# Patient Record
Sex: FEMALE | Race: WHITE | NOT HISPANIC OR LATINO | Employment: OTHER | URBAN - METROPOLITAN AREA
[De-identification: names, ages, dates, MRNs, and addresses within clinical notes are randomized per-mention and may not be internally consistent; named-entity substitution may affect disease eponyms.]

---

## 2017-01-24 DIAGNOSIS — E03.9 ACQUIRED HYPOTHYROIDISM: Primary | ICD-10-CM

## 2017-01-25 RX ORDER — LEVOTHYROXINE SODIUM 175 UG/1
TABLET ORAL
Qty: 90 TABLET | Refills: 1 | Status: SHIPPED | OUTPATIENT
Start: 2017-01-25 | End: 2017-08-01

## 2017-05-26 ENCOUNTER — OFFICE VISIT (OUTPATIENT)
Dept: FAMILY MEDICINE | Facility: OTHER | Age: 42
End: 2017-05-26
Attending: NURSE PRACTITIONER
Payer: COMMERCIAL

## 2017-05-26 VITALS
BODY MASS INDEX: 29.08 KG/M2 | HEIGHT: 62 IN | TEMPERATURE: 98.7 F | DIASTOLIC BLOOD PRESSURE: 60 MMHG | WEIGHT: 158 LBS | SYSTOLIC BLOOD PRESSURE: 110 MMHG | HEART RATE: 87 BPM | OXYGEN SATURATION: 99 %

## 2017-05-26 DIAGNOSIS — E03.9 ACQUIRED HYPOTHYROIDISM: ICD-10-CM

## 2017-05-26 DIAGNOSIS — F40.10 SOCIAL ANXIETY DISORDER: Primary | ICD-10-CM

## 2017-05-26 DIAGNOSIS — N92.1 MENORRHAGIA WITH IRREGULAR CYCLE: ICD-10-CM

## 2017-05-26 LAB
CHOLEST SERPL-MCNC: 171 MG/DL
ERYTHROCYTE [DISTWIDTH] IN BLOOD BY AUTOMATED COUNT: 13.2 % (ref 10–15)
HCT VFR BLD AUTO: 39.3 % (ref 35–47)
HDLC SERPL-MCNC: 46 MG/DL
HGB BLD-MCNC: 13.5 G/DL (ref 11.7–15.7)
LDLC SERPL CALC-MCNC: 94 MG/DL
MCH RBC QN AUTO: 28.1 PG (ref 26.5–33)
MCHC RBC AUTO-ENTMCNC: 34.4 G/DL (ref 31.5–36.5)
MCV RBC AUTO: 82 FL (ref 78–100)
NONHDLC SERPL-MCNC: 125 MG/DL
PLATELET # BLD AUTO: 163 10E9/L (ref 150–450)
RBC # BLD AUTO: 4.81 10E12/L (ref 3.8–5.2)
T4 FREE SERPL-MCNC: 1.43 NG/DL (ref 0.76–1.46)
TRIGL SERPL-MCNC: 157 MG/DL
TSH SERPL DL<=0.005 MIU/L-ACNC: 0.02 MU/L (ref 0.4–4)
WBC # BLD AUTO: 6.3 10E9/L (ref 4–11)

## 2017-05-26 PROCEDURE — 84443 ASSAY THYROID STIM HORMONE: CPT | Mod: ZL | Performed by: NURSE PRACTITIONER

## 2017-05-26 PROCEDURE — 84439 ASSAY OF FREE THYROXINE: CPT | Mod: ZL | Performed by: NURSE PRACTITIONER

## 2017-05-26 PROCEDURE — 85027 COMPLETE CBC AUTOMATED: CPT | Mod: ZL | Performed by: NURSE PRACTITIONER

## 2017-05-26 PROCEDURE — 99212 OFFICE O/P EST SF 10 MIN: CPT

## 2017-05-26 PROCEDURE — 36415 COLL VENOUS BLD VENIPUNCTURE: CPT | Mod: ZL | Performed by: NURSE PRACTITIONER

## 2017-05-26 PROCEDURE — 99213 OFFICE O/P EST LOW 20 MIN: CPT | Performed by: NURSE PRACTITIONER

## 2017-05-26 PROCEDURE — 80061 LIPID PANEL: CPT | Mod: ZL | Performed by: NURSE PRACTITIONER

## 2017-05-26 RX ORDER — CITALOPRAM HYDROBROMIDE 20 MG/1
20 TABLET ORAL DAILY
Qty: 30 TABLET | Refills: 1 | Status: SHIPPED | OUTPATIENT
Start: 2017-05-26 | End: 2017-06-09

## 2017-05-26 RX ORDER — SUMATRIPTAN 25 MG/1
25 TABLET, FILM COATED ORAL
COMMUNITY
Start: 2016-01-19 | End: 2018-06-19

## 2017-05-26 ASSESSMENT — ANXIETY QUESTIONNAIRES
6. BECOMING EASILY ANNOYED OR IRRITABLE: NOT AT ALL
1. FEELING NERVOUS, ANXIOUS, OR ON EDGE: SEVERAL DAYS
3. WORRYING TOO MUCH ABOUT DIFFERENT THINGS: SEVERAL DAYS
2. NOT BEING ABLE TO STOP OR CONTROL WORRYING: SEVERAL DAYS
GAD7 TOTAL SCORE: 6
5. BEING SO RESTLESS THAT IT IS HARD TO SIT STILL: SEVERAL DAYS
IF YOU CHECKED OFF ANY PROBLEMS ON THIS QUESTIONNAIRE, HOW DIFFICULT HAVE THESE PROBLEMS MADE IT FOR YOU TO DO YOUR WORK, TAKE CARE OF THINGS AT HOME, OR GET ALONG WITH OTHER PEOPLE: NOT DIFFICULT AT ALL
7. FEELING AFRAID AS IF SOMETHING AWFUL MIGHT HAPPEN: SEVERAL DAYS

## 2017-05-26 ASSESSMENT — PAIN SCALES - GENERAL: PAINLEVEL: NO PAIN (0)

## 2017-05-26 ASSESSMENT — PATIENT HEALTH QUESTIONNAIRE - PHQ9: 5. POOR APPETITE OR OVEREATING: SEVERAL DAYS

## 2017-05-26 NOTE — PATIENT INSTRUCTIONS
ASSESSMENT/PLAN:                                                        1. Social anxiety disorder  chronic  - citalopram (CELEXA) 20 MG tablet; Take 1 tablet (20 mg) by mouth daily  Dispense: 30 tablet; Refill: 1    2. Acquired hypothyroidism  chronic  - TSH with free T4 reflex  - Lipid Profile    3. Menorrhagia with irregular cycle  - CBC with platelets  - due for pap, will return    FUTURE APPOINTMENTS:       - Follow-up visit in 2 weeks for comprehensive exam and follow-up of anxiety    Maria Del Rosario Alvarez NP  [facility]

## 2017-05-26 NOTE — PROGRESS NOTES
SUBJECTIVE:                                                    Gita Jones is a 41 year old female who presents to clinic today for the following health issues:  Chief Complaint   Patient presents with     Establish Care     Abnormal Bleeding Problem     Having spotting, 2 periods a month just this month. Denies fever, chills.        New Patient/Transfer of Care - she had been following in [place], but decided to transfer here due to the drive.   Migraine Follow-Up    Headaches symptoms:  Stable     Frequency: twice a year     Duration of headaches: days    Able to do normal daily activities/work with migraines: No     Rescue/Relief medication:sumatriptan (Imitrex)              Effectiveness: moderate relief    Preventative medication: None    Neurologic complications: No new stroke-like symptoms, loss of vision or speech, numbness or weakness    In the past 4 weeks, how often have you gone to Urgent Care or the emergency room because of your headaches?  0     Hypothyroidism Follow-up      Since last visit, patient describes the following symptoms: Weight stable, no hair loss, no skin changes, no constipation, no loose stools    TSh last august was elevated - dose was increased.  Due for repeat labs today       Amount of exercise or physical activity: None    Problems taking medications regularly: No    Medication side effects: none    Diet: vegetarian/vegan    Vaginal Bleeding (Dysmenorrhea)     Onset: one month    Description:   Duration of bleeding episodes: 1 week, now resolved  Frequency between periods:  28 days  Describe bleeding/flow:   Clots: YES  Number of pads/hour: 1  Cramping: severe    Accompanying Signs & Symptoms:  Weakness: no  Lightheadedness: no  Hot flashes: no  Nosebleeds/Easy bruising: no  Vaginal Discharge: no     History:  No LMP recorded.  Previous normal periods: YES  Contraceptive use: NO  Possibility of Pregnancy: no  Any bleeding after intercourse: no  Age of first period  (menarche):   Abnormal PAP Smears: last pap was  and was normal.  Had one abnormal pap 12 years ago.      Precipitating factors:   none    Alleviating factors:  None    Symptoms occurred only once.  She is due for pap     Therapies Tried and outcome: none      Anxiety     Status since last visit: No change - had tried lexapro but was not on it very long.      Other associated symptoms:cannot sleep    Complicating factors:   Significant life event: son brandy disorder,    Current substance abuse: None  Depression symptoms: No  NICOLE-7 SCORE 2016   Total Score 2        GAD7     States believes she is lactose and gluten intolerant.        Problem list and histories reviewed & adjusted, as indicated.  Additional history: as documented    Patient Active Problem List   Diagnosis     Hypothyroidism     History of pelvic mass     Constipation     Social anxiety disorder     Migraine     ACP (advance care planning)     Migraine without aura and without status migrainosus, not intractable     Past Surgical History:   Procedure Laterality Date     APPENDECTOMY        SECTION        SECTION       HERNIORRHAPHY UMBILICAL  2013    Procedure: HERNIORRHAPHY UMBILICAL;  OPEN SUPRA UMBILICAL HERNIA REPAIR WITH MESH;  Surgeon: Shante Goss DO;  Location: [facility]     lymph node bx neck-benign         Social History   Substance Use Topics     Smoking status: Former Smoker     Packs/day: 2.00     Years: 10.00     Types: Cigarettes     Smokeless tobacco: Never Used      Comment: quit in . no passive exposure.     Alcohol use No     Family History   Problem Relation Age of Onset     CANCER Mother 54     cause of death     DIABETES Father      Lipids Father      hyperlipidemia     Hypertension Father      CEREBROVASCULAR DISEASE Father      Neurologic Disorder Son      seizures         Current Outpatient Prescriptions   Medication Sig Dispense Refill     SUMAtriptan (IMITREX) 25 MG tablet Take 25  "mg by mouth       levothyroxine (SYNTHROID/LEVOTHROID) 175 MCG tablet TAKE 1 TABLET(175 MCG) BY MOUTH DAILY 90 tablet 1     lactulose (CHRONULAC) 10 GM/15ML solution Take 30 mLs (20 g) by mouth 2 times daily 500 mL 1     multivitamin, therapeutic with minerals (THERA-VIT-M) TABS Take 1 tablet by mouth daily.       Allergies   Allergen Reactions     Dye [Contrast Dye] Rash     IV dye, iodine containing contrast media     Nickel Rash     Recent Labs   Lab Test  08/18/16   1840  05/20/16   1029   12/01/14   2341   06/13/13   2228   ALT  20   --    --   24   --   39   CR  0.68   --    --   0.89   < >  0.95   GFRESTIMATED  >90  Non  GFR Calc     --    --   70   < >  66   GFRESTBLACK  >90   GFR Calc     --    --   85   < >  80   POTASSIUM  3.6   --    --   3.6   < >  3.9   TSH  13.35*  4.18*   < >   --    < >  0.35    < > = values in this interval not displayed.      BP Readings from Last 3 Encounters:   05/26/17 110/60   08/31/16 122/73   08/18/16 124/82    Wt Readings from Last 3 Encounters:   05/26/17 158 lb (71.7 kg)   08/31/16 152 lb (68.9 kg)   05/20/16 148 lb (67.1 kg)                    Reviewed and updated as needed this visit by clinical staff  Tobacco  Allergies  Meds  Med Hx  Surg Hx  Fam Hx  Soc Hx      Reviewed and updated as needed this visit by Provider         ROS:  Constitutional, HEENT, cardiovascular, pulmonary, gi and gu systems are negative, except as otherwise noted.    OBJECTIVE:                                                    /60  Pulse 87  Temp 98.7  F (37.1  C) (Tympanic)  Ht 5' 2\" (1.575 m)  Wt 158 lb (71.7 kg)  SpO2 99%  BMI 28.9 kg/m2  Body mass index is 28.9 kg/(m^2).  GENERAL: healthy, alert and no distress  PSYCH: mentation appears normal, affect normal/bright       ASSESSMENT/PLAN:                                                      establish care    1. Social anxiety disorder  chronic  - citalopram (CELEXA) 20 MG tablet; Take 1 tablet " (20 mg) by mouth daily  Dispense: 30 tablet; Refill: 1    2. Acquired hypothyroidism  chronic  - TSH with free T4 reflex  - Lipid Profile    3. Menorrhagia with irregular cycle  - CBC with platelets  - due for pap, will return  - if symptoms persist will proceed with imaging.     FUTURE APPOINTMENTS:       - Follow-up visit in 2 weeks for comprehensive exam with pap and follow-up of anxiety    Maria Del Rosario Alvarez NP  [facility]

## 2017-05-26 NOTE — NURSING NOTE
"Chief Complaint   Patient presents with     Establish Care     Abnormal Bleeding Problem     Having spotting, 2 periods a month just this month. Denies fever, chills.        Initial /60  Pulse 87  Temp 98.7  F (37.1  C) (Tympanic)  Ht 5' 2\" (1.575 m)  Wt 158 lb (71.7 kg)  SpO2 99%  BMI 28.9 kg/m2 Estimated body mass index is 28.9 kg/(m^2) as calculated from the following:    Height as of this encounter: 5' 2\" (1.575 m).    Weight as of this encounter: 158 lb (71.7 kg).  Medication Reconciliation: complete   Charline Harris      "

## 2017-05-26 NOTE — MR AVS SNAPSHOT
After Visit Summary   5/26/2017    Gita Jones    MRN: [id number]           Patient Information     Date Of Birth          1975        Visit Information        Provider Department      5/26/2017 10:30 AM Maria Del Rosario Alvarez NP [facility]        Today's Diagnoses     Social anxiety disorder    -  1    Acquired hypothyroidism        Menorrhagia with irregular cycle          Care Instructions      ASSESSMENT/PLAN:                                                        1. Social anxiety disorder  chronic  - citalopram (CELEXA) 20 MG tablet; Take 1 tablet (20 mg) by mouth daily  Dispense: 30 tablet; Refill: 1    2. Acquired hypothyroidism  chronic  - TSH with free T4 reflex  - Lipid Profile    3. Menorrhagia with irregular cycle  - CBC with platelets  - due for pap, will return    FUTURE APPOINTMENTS:       - Follow-up visit in 2 weeks for comprehensive exam and follow-up of anxiety    Maria Del Rosario Alvarez NP  [facility]            Follow-ups after your visit        Your next 10 appointments already scheduled     Jun 09, 2017  3:00 PM CDT   (Arrive by 2:45 PM)   Office Visit with Maria Del Rosario Alvarez NP   [facility] ([facility] )    [address] [address]   [phone]           Bring a current list of meds and any records pertaining to this visit.  For Physicals, please bring immunization records and any forms needing to be filled out.    Please arrive 15 minutes early to complete paperwork and register.              Who to contact     If you have questions or need follow up information about today's clinic visit or your schedule please contact [facility] directly at [phone].  Normal or non-critical lab and imaging results will be communicated to you by MyChart, letter or phone within 4 business days after the clinic has received the results. If you do not hear from us within 7 days,  "please contact the clinic through [employer] or phone. If you have a critical or abnormal lab result, we will notify you by phone as soon as possible.  Submit refill requests through [employer] or call your pharmacy and they will forward the refill request to us. Please allow 3 business days for your refill to be completed.          Additional Information About Your Visit        [employer] Information     [employer] gives you secure access to your electronic health record. If you see a primary care provider, you can also send messages to your care team and make appointments. If you have questions, please call your primary care clinic.  If you do not have a primary care provider, please call [phone] and they will assist you.        Care EveryWhere ID     This is your Care EveryWhere ID. This could be used by other organizations to access your [place] medical records  [id number]        Your Vitals Were     Pulse Temperature Height Pulse Oximetry BMI (Body Mass Index)       87 98.7  F (37.1  C) (Tympanic) 5' 2\" (1.575 m) 99% 28.9 kg/m2        Blood Pressure from Last 3 Encounters:   05/26/17 110/60   08/31/16 122/73   08/18/16 124/82    Weight from Last 3 Encounters:   05/26/17 158 lb (71.7 kg)   08/31/16 152 lb (68.9 kg)   05/20/16 148 lb (67.1 kg)              We Performed the Following     CBC with platelets     Lipid Profile     TSH with free T4 reflex          Today's Medication Changes          These changes are accurate as of: 5/26/17 10:56 AM.  If you have any questions, ask your nurse or doctor.               Start taking these medicines.        Dose/Directions    citalopram 20 MG tablet   Commonly known as:  celeXA   Used for:  Social anxiety disorder   Started by:  Maria Del Rosario Alvarez NP        Dose:  20 mg   Take 1 tablet (20 mg) by mouth daily   Quantity:  30 tablet   Refills:  1         Stop taking these medicines if you haven't already. Please contact your care team if you have questions.     docusate " sodium 100 MG capsule   Commonly known as:  COLACE   Stopped by:  Maria Del Rosario Alvarez NP           MEDROL 32 MG tablet   Generic drug:  methylPREDNISolone   Stopped by:  Maria Del Rosario Alvarez NP           polyethylene glycol powder   Commonly known as:  MIRALAX   Stopped by:  Maria Del Rosario Alvarez NP                Where to get your medicines      These medications were sent to [facility] [address] [address] [place] [facility] [address] [place] [address]     Phone:  [phone]     citalopram 20 MG tablet                Primary Care Provider Office Phone # Fax #    Maria Del Rosario Alvarez [phone]       [facility] [address] [place]  [facility] [address]        Thank you!     Thank you for choosing [facility]  for your care. Our goal is always to provide you with excellent care. Hearing back from our patients is one way we can continue to improve our services. Please take a few minutes to complete the written survey that you may receive in the mail after your visit with us. Thank you!             Your Updated Medication List - Protect others around you: Learn how to safely use, store and throw away your medicines at www.disposemymeds.org.          This list is accurate as of: 5/26/17 10:56 AM.  Always use your most recent med list.                   Brand Name Dispense Instructions for use    citalopram 20 MG tablet    celeXA    30 tablet    Take 1 tablet (20 mg) by mouth daily       lactulose 10 GM/15ML solution    CHRONULAC    500 mL    Take 30 mLs (20 g) by mouth 2 times daily       levothyroxine 175 MCG tablet    SYNTHROID/LEVOTHROID    90 tablet    TAKE 1 TABLET(175 MCG) BY MOUTH DAILY       multivitamin, therapeutic with minerals Tabs tablet      Take 1 tablet by mouth daily.       SUMAtriptan 25 MG tablet    IMITREX     Take 25 mg by mouth

## 2017-05-27 ASSESSMENT — PATIENT HEALTH QUESTIONNAIRE - PHQ9: SUM OF ALL RESPONSES TO PHQ QUESTIONS 1-9: 0

## 2017-05-27 ASSESSMENT — ANXIETY QUESTIONNAIRES: GAD7 TOTAL SCORE: 6

## 2017-06-09 ENCOUNTER — OFFICE VISIT (OUTPATIENT)
Dept: FAMILY MEDICINE | Facility: OTHER | Age: 42
End: 2017-06-09
Attending: NURSE PRACTITIONER
Payer: COMMERCIAL

## 2017-06-09 VITALS
TEMPERATURE: 97.7 F | OXYGEN SATURATION: 98 % | BODY MASS INDEX: 29.44 KG/M2 | SYSTOLIC BLOOD PRESSURE: 112 MMHG | HEIGHT: 62 IN | DIASTOLIC BLOOD PRESSURE: 70 MMHG | HEART RATE: 77 BPM | WEIGHT: 160 LBS

## 2017-06-09 DIAGNOSIS — E03.9 ACQUIRED HYPOTHYROIDISM: ICD-10-CM

## 2017-06-09 DIAGNOSIS — Z12.4 CERVICAL CANCER SCREENING: ICD-10-CM

## 2017-06-09 DIAGNOSIS — Z00.00 ROUTINE GENERAL MEDICAL EXAMINATION AT A HEALTH CARE FACILITY: Primary | ICD-10-CM

## 2017-06-09 DIAGNOSIS — Z12.31 ENCOUNTER FOR SCREENING MAMMOGRAM FOR BREAST CANCER: ICD-10-CM

## 2017-06-09 PROCEDURE — G0476 HPV COMBO ASSAY CA SCREEN: HCPCS | Mod: ZL | Performed by: NURSE PRACTITIONER

## 2017-06-09 PROCEDURE — 87624 HPV HI-RISK TYP POOLED RSLT: CPT | Mod: ZL | Performed by: NURSE PRACTITIONER

## 2017-06-09 PROCEDURE — 99396 PREV VISIT EST AGE 40-64: CPT | Performed by: NURSE PRACTITIONER

## 2017-06-09 PROCEDURE — G0123 SCREEN CERV/VAG THIN LAYER: HCPCS | Mod: ZL | Performed by: NURSE PRACTITIONER

## 2017-06-09 PROCEDURE — 99000 SPECIMEN HANDLING OFFICE-LAB: CPT | Performed by: NURSE PRACTITIONER

## 2017-06-09 ASSESSMENT — ANXIETY QUESTIONNAIRES
2. NOT BEING ABLE TO STOP OR CONTROL WORRYING: SEVERAL DAYS
IF YOU CHECKED OFF ANY PROBLEMS ON THIS QUESTIONNAIRE, HOW DIFFICULT HAVE THESE PROBLEMS MADE IT FOR YOU TO DO YOUR WORK, TAKE CARE OF THINGS AT HOME, OR GET ALONG WITH OTHER PEOPLE: SOMEWHAT DIFFICULT
1. FEELING NERVOUS, ANXIOUS, OR ON EDGE: SEVERAL DAYS
GAD7 TOTAL SCORE: 6
5. BEING SO RESTLESS THAT IT IS HARD TO SIT STILL: SEVERAL DAYS
7. FEELING AFRAID AS IF SOMETHING AWFUL MIGHT HAPPEN: SEVERAL DAYS
6. BECOMING EASILY ANNOYED OR IRRITABLE: NOT AT ALL
3. WORRYING TOO MUCH ABOUT DIFFERENT THINGS: SEVERAL DAYS

## 2017-06-09 ASSESSMENT — PAIN SCALES - GENERAL: PAINLEVEL: NO PAIN (0)

## 2017-06-09 ASSESSMENT — PATIENT HEALTH QUESTIONNAIRE - PHQ9: 5. POOR APPETITE OR OVEREATING: SEVERAL DAYS

## 2017-06-09 NOTE — PROGRESS NOTES
CHIEF COMPLAINT:  Chief Complaint   Patient presents with     Physical     pap      Anxiety     Would like to discuss celexa, hasn't been taking due to reading side effects it may cAUSE       HPI:  Gita is a 41 year old female who present today for yearly exam.  Her last pap smear was in years ago.  She denies any history of abnormal pap smears.  Her last mammogram was completed in years ago.  Tdap was updated in .  Last Dexa scan NA.  Last colonoscopy Has not had.       She did not start celexa - was worried about side effects.  She did reduce stress at home and mood us much better.      Past Medical History:   Diagnosis Date     Abdominal pain, unspecified site 2002     Abnormal feces 2004     Abnormal maternal glucose tolerance, antepartum 2001     Acute sinusitis, unspecified 2005     Chest pain, unspecified 2003     Diarrhea 2004     Dizziness and giddiness 2008     Headache(784.0) 2002     History of pelvic mass 2014     hypothyroidism 2012     Irregular menstrual cycle 2003     Migraine, unspecified, without mention of intractable migraine without mention of status migrainosus 2004     Other disorder of menstruation and other abnormal bleeding from female genital tract 2006     Other malaise and fatigue 2002     Other specified hemorrhage in early pregnancy, antepartum 2000     Social anxiety disorder 2015     Supervision of other normal pregnancy 2000     Syncope and collapse 2003     Unspecified symptom associated with female genital organs 2002       Past Surgical History:   Procedure Laterality Date     APPENDECTOMY        SECTION        SECTION       HERNIORRHAPHY UMBILICAL  2013    Procedure: HERNIORRHAPHY UMBILICAL;  OPEN SUPRA UMBILICAL HERNIA REPAIR WITH MESH;  Surgeon: Shante Goss DO;  Location: [facility]     lymph node bx neck-benign         Current Outpatient Prescriptions    Medication     SUMAtriptan (IMITREX) 25 MG tablet     levothyroxine (SYNTHROID/LEVOTHROID) 175 MCG tablet     multivitamin, therapeutic with minerals (THERA-VIT-M) TABS     citalopram (CELEXA) 20 MG tablet     No current facility-administered medications for this visit.        Allergies   Allergen Reactions     Dye [Contrast Dye] Rash     IV dye, iodine containing contrast media     Nickel Rash       Family History   Problem Relation Age of Onset     CANCER Mother 54     cause of death     DIABETES Father      Lipids Father      hyperlipidemia     Hypertension Father      CEREBROVASCULAR DISEASE Father      Neurologic Disorder Son      seizures       Social History     Social History     Marital status:      Spouse name: N/A     Number of children: N/A     Years of education: N/A     Social History Main Topics     Smoking status: Former Smoker     Packs/day: 2.00     Years: 10.00     Types: Cigarettes     Smokeless tobacco: Never Used      Comment: quit in 2001. no passive exposure.     Alcohol use No     Drug use: No     Sexual activity: Not Asked     Other Topics Concern     Blood Transfusions Yes     Caffeine Concern Yes     coffee >6 cups per day     Exercise Yes     daily     Seat Belt Yes     Parent/Sibling W/ Cabg, Mi Or Angioplasty Before 65f 55m? No     Social History Narrative       REVIEW OF SYSTEMS  Skin: negative, pigmentation, acne, rash  Eyes: negative, visual blurring, double vision, eye pain  Ears/Nose/Throat: negative, nasal congestion, purulent rhinorrhea, postnasal drainage  Respiratory: No shortness of breath, dyspnea on exertion, cough, or hemoptysis  Cardiovascular: negative, palpitations, tachycardia, irregular heart beat and chest pain  Gastrointestinal: negative, dysphagia, nausea, vomiting and heartburn  Genitourinary: negative, dysuria, frequency, urgency, hesitancy and hematuria  Musculoskeletal: negative, back pain, neck pain and joint pain  Neurologic: negative, headaches,  numbness or tingling of hands and numbness or tingling of feet  Psychiatric: negative, excessive stress, sleep disturbance, feeling anxious and anxiety  Hematologic/Lymphatic/Immunologic: negative, chills, fever and weight loss  Endocrine: thyroid disorder    OBJECTIVE:  B/P: 112/70, T: 97.7, P: 77, R: Data Unavailable  Constitutional: healthy, alert and no distress  Head: Normocephalic. No masses, lesions, tenderness or abnormalities  ENT: ENT exam normal, no neck nodes or sinus tenderness  Cardiovascular:  RRR. No murmurs, clicks gallops or rub  Respiratory:  Good diaphragmatic excursion. Lungs clear  Breasts: normal without suspicious masses, skin changes or axillary nodes, symmetric fibrous changes in both upper outer quadrants.  Gastrointestinal: Abdomen soft, non-tender. BS normal. No masses, organomegaly  : Normal external genitalia without lesions  Pelvic exam: normal vagina and vulva, normal cervix without lesions or tenderness, uterus normal size anteverted, adenxa normal in size without tenderness, pap smear done.  Musculoskeletal: extremities normal- no gross deformities noted, gait normal and normal muscle tone  Skin: no suspicious lesions or rashes  Neurologic: Gait normal. Reflexes normal and symmetric. Sensation grossly WNL.  Psychiatric: mentation appears normal and affect normal/bright    LABS AND IMAGING:  Results for orders placed or performed in visit on 05/26/17   TSH with free T4 reflex   Result Value Ref Range    TSH 0.02 (L) 0.40 - 4.00 mU/L   Lipid Profile   Result Value Ref Range    Cholesterol 171 <200 mg/dL    Triglycerides 157 (H) <150 mg/dL    HDL Cholesterol 46 (L) >49 mg/dL    LDL Cholesterol Calculated 94 <100 mg/dL    Non HDL Cholesterol 125 <130 mg/dL   CBC with platelets   Result Value Ref Range    WBC 6.3 4.0 - 11.0 10e9/L    RBC Count 4.81 3.8 - 5.2 10e12/L    Hemoglobin 13.5 11.7 - 15.7 g/dL    Hematocrit 39.3 35.0 - 47.0 %    MCV 82 78 - 100 fl    MCH 28.1 26.5 - 33.0 pg     MCHC 34.4 31.5 - 36.5 g/dL    RDW 13.2 10.0 - 15.0 %    Platelet Count 163 150 - 450 10e9/L   T4 free   Result Value Ref Range    T4 Free 1.43 0.76 - 1.46 ng/dL         ASSESSMENT/PLAN:   1. Routine general medical examination at a health care facility  - A pap thin layer screen with  HPV - recommended age 30 - 65 years (select HPV order below)    2. Cervical cancer screening  - A pap thin layer screen with  HPV - recommended age 30 - 65 years (select HPV order below)  - HPV High Risk Types DNA Cervical    3. Encounter for screening mammogram for breast cancer  - MA Digital Screening Bilateral    4. Acquired hypothyroidism  - US Thyroid  .      Maria Del Rosario Alvarez,   Certified Adult Nurse Practitioner  [phone]

## 2017-06-09 NOTE — NURSING NOTE
"Chief Complaint   Patient presents with     Physical     pap      Anxiety     Would like to discuss celexa, hasn't been taking due to reading side effects it may cAUSE       Initial /70 (BP Location: Left arm, Patient Position: Chair, Cuff Size: Adult Regular)  Pulse 77  Temp 97.7  F (36.5  C) (Tympanic)  Ht 5' 2\" (1.575 m)  Wt 160 lb (72.6 kg)  SpO2 98%  BMI 29.26 kg/m2 Estimated body mass index is 29.26 kg/(m^2) as calculated from the following:    Height as of this encounter: 5' 2\" (1.575 m).    Weight as of this encounter: 160 lb (72.6 kg).  Medication Reconciliation: complete   JUICE BELLO      "

## 2017-06-09 NOTE — PATIENT INSTRUCTIONS
ASSESSMENT/PLAN:   1. Routine general medical examination at a health care facility  - A pap thin layer screen with  HPV - recommended age 30 - 65 years (select HPV order below)    2. Cervical cancer screening  - A pap thin layer screen with  HPV - recommended age 30 - 65 years (select HPV order below)  - HPV High Risk Types DNA Cervical    3. Encounter for screening mammogram for breast cancer  - MA Digital Screening Bilateral    4. Acquired hypothyroidism  - US Thyroid  .      Maria Del Rosario Alvarez,   Certified Adult Nurse Practitioner  [phone]

## 2017-06-09 NOTE — MR AVS SNAPSHOT
After Visit Summary   6/9/2017    Gita Jones    MRN: [id number]           Patient Information     Date Of Birth          1975        Visit Information        Provider Department      6/9/2017 3:00 PM Maria Del Rosario Alvarez NP [facility]        Today's Diagnoses     Routine general medical examination at a health care facility    -  1    Cervical cancer screening        Encounter for screening mammogram for breast cancer        Acquired hypothyroidism          Care Instructions        ASSESSMENT/PLAN:   1. Routine general medical examination at a health care facility  - A pap thin layer screen with  HPV - recommended age 30 - 65 years (select HPV order below)    2. Cervical cancer screening  - A pap thin layer screen with  HPV - recommended age 30 - 65 years (select HPV order below)  - HPV High Risk Types DNA Cervical    3. Encounter for screening mammogram for breast cancer  - MA Digital Screening Bilateral    4. Acquired hypothyroidism  - US Thyroid  .      Maria Del Rosario Alvarez,   Certified Adult Nurse Practitioner  [phone]          Follow-ups after your visit        Who to contact     If you have questions or need follow up information about today's clinic visit or your schedule please contact [facility] directly at [phone].  Normal or non-critical lab and imaging results will be communicated to you by MyChart, letter or phone within 4 business days after the clinic has received the results. If you do not hear from us within 7 days, please contact the clinic through Liqueohart or phone. If you have a critical or abnormal lab result, we will notify you by phone as soon as possible.  Submit refill requests through As It Is or call your pharmacy and they will forward the refill request to us. Please allow 3 business days for your refill to be completed.          Additional Information About Your Visit        MyChart Information     As It Is gives you  "secure access to your electronic health record. If you see a primary care provider, you can also send messages to your care team and make appointments. If you have questions, please call your primary care clinic.  If you do not have a primary care provider, please call [phone] and they will assist you.        Care EveryWhere ID     This is your Care EveryWhere ID. This could be used by other organizations to access your [place] medical records  [id number]        Your Vitals Were     Pulse Temperature Height Pulse Oximetry BMI (Body Mass Index)       77 97.7  F (36.5  C) (Tympanic) 5' 2\" (1.575 m) 98% 29.26 kg/m2        Blood Pressure from Last 3 Encounters:   06/09/17 112/70   05/26/17 110/60   08/31/16 122/73    Weight from Last 3 Encounters:   06/09/17 160 lb (72.6 kg)   05/26/17 158 lb (71.7 kg)   08/31/16 152 lb (68.9 kg)              We Performed the Following     A pap thin layer screen with  HPV - recommended age 30 - 65 years (select HPV order below)     HPV High Risk Types DNA Cervical     MA Digital Screening Bilateral     US Thyroid          Today's Medication Changes          These changes are accurate as of: 6/9/17  3:55 PM.  If you have any questions, ask your nurse or doctor.               Stop taking these medicines if you haven't already. Please contact your care team if you have questions.     lactulose 10 GM/15ML solution   Commonly known as:  CHRONULAC   Stopped by:  Maria Del Rosario Alvarez NP                    Primary Care Provider Office Phone # Fax #    Maria Del Rosario Alvarez [phone]       [facility] [address] [place] [address]        Thank you!     Thank you for choosing [facility]  for your care. Our goal is always to provide you with excellent care. Hearing back from our patients is one way we can continue to improve our services. Please take a few minutes to complete the written survey that you may receive in the " mail after your visit with us. Thank you!             Your Updated Medication List - Protect others around you: Learn how to safely use, store and throw away your medicines at www.disposemymeds.org.          This list is accurate as of: 6/9/17  3:55 PM.  Always use your most recent med list.                   Brand Name Dispense Instructions for use    levothyroxine 175 MCG tablet    SYNTHROID/LEVOTHROID    90 tablet    TAKE 1 TABLET(175 MCG) BY MOUTH DAILY       multivitamin, therapeutic with minerals Tabs tablet      Take 1 tablet by mouth daily.       SUMAtriptan 25 MG tablet    IMITREX     Take 25 mg by mouth

## 2017-06-10 ASSESSMENT — PATIENT HEALTH QUESTIONNAIRE - PHQ9: SUM OF ALL RESPONSES TO PHQ QUESTIONS 1-9: 4

## 2017-06-10 ASSESSMENT — ANXIETY QUESTIONNAIRES: GAD7 TOTAL SCORE: 6

## 2017-06-12 DIAGNOSIS — Z12.31 VISIT FOR SCREENING MAMMOGRAM: Primary | ICD-10-CM

## 2017-06-16 ENCOUNTER — HOSPITAL ENCOUNTER (OUTPATIENT)
Dept: ULTRASOUND IMAGING | Facility: HOSPITAL | Age: 42
Discharge: HOME | End: 2017-06-16
Attending: NURSE PRACTITIONER | Admitting: NURSE PRACTITIONER
Payer: COMMERCIAL

## 2017-06-16 ENCOUNTER — TELEPHONE (OUTPATIENT)
Dept: FAMILY MEDICINE | Facility: OTHER | Age: 42
End: 2017-06-16

## 2017-06-16 DIAGNOSIS — N64.52 NIPPLE DISCHARGE: Primary | ICD-10-CM

## 2017-06-16 LAB
COPATH REPORT: NORMAL
PAP: NORMAL

## 2017-06-16 PROCEDURE — 76536 US EXAM OF HEAD AND NECK: CPT | Mod: TC

## 2017-06-16 PROCEDURE — G0279 TOMOSYNTHESIS, MAMMO: HCPCS | Mod: TC

## 2017-06-16 PROCEDURE — G0204 DX MAMMO INCL CAD BI: HCPCS | Mod: TC

## 2017-06-19 LAB
FINAL DIAGNOSIS: NORMAL
HPV HR 12 DNA CVX QL NAA+PROBE: NEGATIVE
HPV16 DNA SPEC QL NAA+PROBE: NEGATIVE
HPV18 DNA SPEC QL NAA+PROBE: NEGATIVE
SPECIMEN DESCRIPTION: NORMAL

## 2017-07-30 DIAGNOSIS — E03.9 ACQUIRED HYPOTHYROIDISM: ICD-10-CM

## 2017-08-01 RX ORDER — LEVOTHYROXINE SODIUM 175 UG/1
TABLET ORAL
Qty: 90 TABLET | Refills: 0 | OUTPATIENT
Start: 2017-08-01

## 2017-08-01 RX ORDER — LEVOTHYROXINE SODIUM 175 UG/1
TABLET ORAL
Qty: 90 TABLET | Refills: 0 | Status: SHIPPED | OUTPATIENT
Start: 2017-08-01 | End: 2017-11-17

## 2017-08-21 ENCOUNTER — TELEPHONE (OUTPATIENT)
Dept: FAMILY MEDICINE | Facility: OTHER | Age: 42
End: 2017-08-21

## 2017-08-21 DIAGNOSIS — R73.09 ELEVATED GLUCOSE: Primary | ICD-10-CM

## 2017-08-21 NOTE — TELEPHONE ENCOUNTER
"3:31 PM    Reason for Call: Phone Call    Description: patient wondering if you could run an order for a \"blood glucose \"    Was an appointment offered for this call? No  If yes : Appointment type              Date    Preferred method for responding to this message: Telephone Call  What is your phone number 1- [phone]    If we cannot reach you directly, may we leave a detailed response at the number you provided? Yes    Can this message wait until your PCP/provider returns, if available today? Not applicable, provider in    Ashley Pennington      "

## 2017-08-22 DIAGNOSIS — R73.09 ELEVATED GLUCOSE: ICD-10-CM

## 2017-08-22 LAB
EST. AVERAGE GLUCOSE BLD GHB EST-MCNC: 103 MG/DL
HBA1C MFR BLD: 5.2 % (ref 4.3–6)

## 2017-08-22 PROCEDURE — 83036 HEMOGLOBIN GLYCOSYLATED A1C: CPT | Mod: ZL,59 | Performed by: NURSE PRACTITIONER

## 2017-08-22 PROCEDURE — 40000788 ZZHCL STATISTIC ESTIMATED AVERAGE GLUCOSE: Mod: ZL | Performed by: NURSE PRACTITIONER

## 2017-08-22 PROCEDURE — 36415 COLL VENOUS BLD VENIPUNCTURE: CPT | Mod: ZL | Performed by: NURSE PRACTITIONER

## 2017-08-22 NOTE — TELEPHONE ENCOUNTER
Wasn't felling well the other day shakey eyes fuzzy use a friends machine and bs was 210 told her we would put in for an A1C and go from there.  Pamela M Lechevalier LPN

## 2017-08-31 ENCOUNTER — TELEPHONE (OUTPATIENT)
Dept: FAMILY MEDICINE | Facility: OTHER | Age: 42
End: 2017-08-31

## 2017-08-31 NOTE — TELEPHONE ENCOUNTER
11:43 AM    Reason for Call: Phone Call    Description: Pt calling and wanting to update Akash on her Blood sugars which are coming high on her new machine and pt feels shaky and crappy. Reading was 206 when she woke up. Please call her to advise. Thank you    Was an appointment offered for this call? No  If yes : Appointment type              Date    Preferred method for responding to this message: Telephone Call  What is your phone number ? [phone]    If we cannot reach you directly, may we leave a detailed response at the number you provided? Yes    Can this message wait until your PCP/provider returns, if available today? Provider is in    Lay Hutchison

## 2017-09-01 ENCOUNTER — OFFICE VISIT (OUTPATIENT)
Dept: FAMILY MEDICINE | Facility: OTHER | Age: 42
End: 2017-09-01
Attending: NURSE PRACTITIONER
Payer: COMMERCIAL

## 2017-09-01 VITALS
DIASTOLIC BLOOD PRESSURE: 66 MMHG | TEMPERATURE: 98.1 F | WEIGHT: 155 LBS | HEIGHT: 62 IN | HEART RATE: 80 BPM | RESPIRATION RATE: 14 BRPM | BODY MASS INDEX: 28.52 KG/M2 | SYSTOLIC BLOOD PRESSURE: 116 MMHG

## 2017-09-01 DIAGNOSIS — R73.09 ELEVATED GLUCOSE: ICD-10-CM

## 2017-09-01 DIAGNOSIS — R00.2 PALPITATIONS: ICD-10-CM

## 2017-09-01 DIAGNOSIS — Z86.32 HISTORY OF GESTATIONAL DIABETES MELLITUS (GDM): ICD-10-CM

## 2017-09-01 DIAGNOSIS — G43.009 MIGRAINE WITHOUT AURA AND WITHOUT STATUS MIGRAINOSUS, NOT INTRACTABLE: ICD-10-CM

## 2017-09-01 DIAGNOSIS — N92.1 MENORRHAGIA WITH IRREGULAR CYCLE: Primary | ICD-10-CM

## 2017-09-01 PROCEDURE — 93010 ELECTROCARDIOGRAM REPORT: CPT | Performed by: INTERNAL MEDICINE

## 2017-09-01 PROCEDURE — 93005 ELECTROCARDIOGRAM TRACING: CPT

## 2017-09-01 PROCEDURE — 99214 OFFICE O/P EST MOD 30 MIN: CPT | Performed by: NURSE PRACTITIONER

## 2017-09-01 PROCEDURE — 99212 OFFICE O/P EST SF 10 MIN: CPT

## 2017-09-01 ASSESSMENT — ANXIETY QUESTIONNAIRES
1. FEELING NERVOUS, ANXIOUS, OR ON EDGE: SEVERAL DAYS
3. WORRYING TOO MUCH ABOUT DIFFERENT THINGS: SEVERAL DAYS
7. FEELING AFRAID AS IF SOMETHING AWFUL MIGHT HAPPEN: SEVERAL DAYS
2. NOT BEING ABLE TO STOP OR CONTROL WORRYING: SEVERAL DAYS
GAD7 TOTAL SCORE: 6
6. BECOMING EASILY ANNOYED OR IRRITABLE: NOT AT ALL
5. BEING SO RESTLESS THAT IT IS HARD TO SIT STILL: SEVERAL DAYS

## 2017-09-01 ASSESSMENT — PATIENT HEALTH QUESTIONNAIRE - PHQ9
5. POOR APPETITE OR OVEREATING: SEVERAL DAYS
SUM OF ALL RESPONSES TO PHQ QUESTIONS 1-9: 4

## 2017-09-01 NOTE — PATIENT INSTRUCTIONS
ASSESSMENT/PLAN:       1. Menorrhagia with irregular cycle  symptomatic  - US Pelvic Complete with Transvaginal; Future  - OB/GYN REFERRAL  - US Pelvic Complete with Transvaginal    2. Migraine without aura and without status migrainosus, not intractable  Chronic  - take imitrex at onset of migraine headache  - MIGRAINE ACTION PLAN    3. Elevated glucose  A1c is normal at 5.2% (8/22/2017)  Referral to diabetes education for continuous glucose monitor study.    4. History of gestational diabetes mellitus (GDM)  - DIABETES EDUCATION REFERRAL ([place])    5. Palpitations  symptomatic  - EKG 12-lead complete w/read - (Clinic Performed)  - Zio Patch 48 Hours; Future  - Zio Patch 48 Hours      FUTURE APPOINTMENTS:       - Follow-up visit in 1 month or as needed for acute concerns.     Maria Del Rosario Alvarez, NP  [facility]

## 2017-09-01 NOTE — NURSING NOTE
"Chief Complaint   Patient presents with     Other     Elevated BS     Headache     Migraine action due       Initial /66 (BP Location: Right arm, Patient Position: Sitting, Cuff Size: Adult Large)  Pulse 80  Temp 98.1  F (36.7  C) (Tympanic)  Resp 14  Ht 5' 2\" (1.575 m)  Wt 155 lb (70.3 kg)  BMI 28.35 kg/m2 Estimated body mass index is 28.35 kg/(m^2) as calculated from the following:    Height as of this encounter: 5' 2\" (1.575 m).    Weight as of this encounter: 155 lb (70.3 kg).  Medication Reconciliation: complete   Margot Padron      "

## 2017-09-01 NOTE — MR AVS SNAPSHOT
After Visit Summary   9/1/2017    Gita Jones    MRN: [id number]           Patient Information     Date Of Birth          1975        Visit Information        Provider Department      9/1/2017 9:30 AM Maria Del Rosario Alvarez NP [facility]        Today's Diagnoses     Menorrhagia with irregular cycle    -  1    Migraine without aura and without status migrainosus, not intractable        Elevated glucose        History of gestational diabetes mellitus (GDM)        Palpitations          Care Instructions      ASSESSMENT/PLAN:       1. Menorrhagia with irregular cycle  symptomatic  - US Pelvic Complete with Transvaginal; Future  - OB/GYN REFERRAL  - US Pelvic Complete with Transvaginal    2. Migraine without aura and without status migrainosus, not intractable  Chronic  - take imitrex at onset of migraine headache  - MIGRAINE ACTION PLAN    3. Elevated glucose  A1c is normal at 5.2% (8/22/2017)  Referral to diabetes education for continuous glucose monitor study.    4. History of gestational diabetes mellitus (GDM)  - DIABETES EDUCATION REFERRAL ([place])    5. Palpitations  symptomatic  - EKG 12-lead complete w/read - (Clinic Performed)  - Zio Patch 48 Hours; Future  - Zio Patch 48 Hours      FUTURE APPOINTMENTS:       - Follow-up visit in 1 month or as needed for acute concerns.     Maria Del Rosario Alvarez NP  [facility]              Follow-ups after your visit        Additional Services     DIABETES EDUCATION REFERRAL ([place])       DIABETES SELF-MANAGEMENT TRAINING (DSMT)  Type of training and number of hours requested:  CGM study;     (Medicare will cover:10 hours initial DSMT in 12-month period, plus 2 hours follow-up DSMT annually)    Please add if the patient has special educational need: None  (Patients with special needs requiring individual DSMT)    Please include the following DMST content:     Patient has the following:    Please begin Medical  Nutritional Therapy.  CGM study  (Medcare allows 3 hours initial MNT in the first calendar year, plus two hours follow up MNT annually.  Additional MNT hours are available for change in medical condition treatment and/or diagnosis)    Additional Services Provided:  >>A1c will be completed upon referral and completion of program unless completed in clinic.  >>Influenza vaccination assessment (form #[id number]) as applicable.  >>Order for diabetes supplies will be faxed to patient's pharmacy.  >>If on insulin: Insulin dose adjustment per staged Diabetes Mgmt. Protocols    DIABETES RESOURCE CENTER  [facility]  Telephone:  [phone]   Fax:  [phone]            OB/GYN REFERRAL       Your provider has referred you to:  [facility]    Please be aware that coverage of these services is subject to the terms and limitations of your health insurance plan.  Call member services at your health plan with any benefit or coverage questions.      Please bring the following with you to your appointment:    (1) Any X-Rays, CTs or MRIs which have been performed.  Contact the facility where they were done to arrange for  prior to your scheduled appointment.   (2) List of current medications   (3) This referral request   (4) Any documents/labs given to you for this referral                  Who to contact     If you have questions or need follow up information about today's clinic visit or your schedule please contact [facility] directly at [phone].  Normal or non-critical lab and imaging results will be communicated to you by MyChart, letter or phone within 4 business days after the clinic has received the results. If you do not hear from us within 7 days, please contact the clinic through MyChart or phone. If you have a critical or abnormal lab result, we will notify you by phone as soon as possible.  Submit refill requests through i.TV or call your pharmacy and they will forward the  "refill request to us. Please allow 3 business days for your refill to be completed.          Additional Information About Your Visit        [employer] Information     quietrevolution gives you secure access to your electronic health record. If you see a primary care provider, you can also send messages to your care team and make appointments. If you have questions, please call your primary care clinic.  If you do not have a primary care provider, please call [phone] and they will assist you.        Care EveryWhere ID     This is your Care EveryWhere ID. This could be used by other organizations to access your [place] medical records  [id number]        Your Vitals Were     Pulse Temperature Respirations Height BMI (Body Mass Index)       80 98.1  F (36.7  C) (Tympanic) 14 5' 2\" (1.575 m) 28.35 kg/m2        Blood Pressure from Last 3 Encounters:   09/01/17 116/66   06/09/17 112/70   05/26/17 110/60    Weight from Last 3 Encounters:   09/01/17 155 lb (70.3 kg)   06/09/17 160 lb (72.6 kg)   05/26/17 158 lb (71.7 kg)              We Performed the Following     DIABETES EDUCATION REFERRAL ([place])     EKG 12-lead complete w/read - (Clinic Performed)     MIGRAINE ACTION PLAN     OB/GYN REFERRAL        Primary Care Provider Office Phone # Fax #    Maria Del Rosario QUIROZTosin Alvarez [phone]       [facility] [address] [facility] [place] [address]        Equal Access to Services     CHANCE LEONARD : Hadii aad ku hadasho Soomaali, waaxda luqadaha, qaybta kaalmada adeegyada, waxay meagan mchugh . So [facility] [phone].    ATENCIÓN: Si habla español, tiene a severino disposición servicios gratuitos de asistencia lingüística. Llame al [phone].    We comply with applicable federal civil rights laws and [place] laws. We do not discriminate on the basis of race, color, national origin, age, disability sex, sexual orientation or gender identity.            Thank you!     Thank you for " choosing [facility]  for your care. Our goal is always to provide you with excellent care. Hearing back from our patients is one way we can continue to improve our services. Please take a few minutes to complete the written survey that you may receive in the mail after your visit with us. Thank you!             Your Updated Medication List - Protect others around you: Learn how to safely use, store and throw away your medicines at www.disposemymeds.org.          This list is accurate as of: 9/1/17 10:32 AM.  Always use your most recent med list.                   Brand Name Dispense Instructions for use Diagnosis    levothyroxine 175 MCG tablet    SYNTHROID/LEVOTHROID    90 tablet    TAKE 1 TABLET(175 MCG) BY MOUTH DAILY    Acquired hypothyroidism       multivitamin, therapeutic with minerals Tabs tablet      Take 1 tablet by mouth daily.        SUMAtriptan 25 MG tablet    IMITREX     Take 25 mg by mouth

## 2017-09-01 NOTE — PROGRESS NOTES
"  SUBJECTIVE:   Gita Jones is a 41 year old female who presents to clinic today for the following health issues:    Gita presents today with weakness, blurry vision.  All symptoms started 5 days ago with heavy vaginal bleeding.  She developed a migraine that lasted 3 days;  She took imitrex on the third day with relief.      Migraine Follow-Up    Headaches symptoms:  Worsened     Frequency: 2x per month     Duration of headaches: 3 days    Able to do normal daily activities/work with migraines: No -     Rescue/Relief medication:sumatriptan (Imitrex)              Effectiveness: almost total relief    Preventative medication: None    Neurologic complications: No new stroke-like symptoms, loss of vision or speech, numbness or weakness    In the past 4 weeks, how often have you gone to Urgent Care or the emergency room because of your headaches?  1        Amount of exercise or physical activity: 6-7 days/week for an average of 45-60 minutes    Problems taking medications regularly: No    Medication side effects: none  Diet: regular (no restrictions)      ED/UC Followup:    Facility:  [facility]  Date of visit: 8/31/17  Reason for visit: Migraine - incidental finding elevated blood sugar  Current Status: Patient reports eye issues and weak.     Consent obtained, [facility] medical record is reviewed.  Glucose was 102; HGB 13.9, kidney function normal, lytes normal.  UA normal    She has felt \"flutter\" in her check that is getting more frequent.  She has a history of arrhythmia, was on a medication on her 20's but has been off for several years.      Problem list and histories reviewed & adjusted, as indicated.  Additional history: as documented    Patient Active Problem List   Diagnosis     Hypothyroidism     History of pelvic mass     Constipation     Social anxiety disorder     Migraine     ACP (advance care planning)     Migraine without aura and without status migrainosus, not intractable     Past Surgical " History:   Procedure Laterality Date     APPENDECTOMY        SECTION        SECTION       HERNIORRHAPHY UMBILICAL  2013    Procedure: HERNIORRHAPHY UMBILICAL;  OPEN SUPRA UMBILICAL HERNIA REPAIR WITH MESH;  Surgeon: Shante Goss DO;  Location: [facility] OR     lymph node bx neck-benign         Social History   Substance Use Topics     Smoking status: Former Smoker     Packs/day: 2.00     Years: 10.00     Types: Cigarettes     Smokeless tobacco: Never Used      Comment: quit in . no passive exposure.     Alcohol use No     Family History   Problem Relation Age of Onset     CANCER Mother 54     cause of death     DIABETES Father      Lipids Father      hyperlipidemia     Hypertension Father      CEREBROVASCULAR DISEASE Father      Neurologic Disorder Son      seizures         Current Outpatient Prescriptions   Medication Sig Dispense Refill     levothyroxine (SYNTHROID/LEVOTHROID) 175 MCG tablet TAKE 1 TABLET(175 MCG) BY MOUTH DAILY 90 tablet 0     SUMAtriptan (IMITREX) 25 MG tablet Take 25 mg by mouth       multivitamin, therapeutic with minerals (THERA-VIT-M) TABS Take 1 tablet by mouth daily.       Allergies   Allergen Reactions     Dye [Contrast Dye] Rash     IV dye, iodine containing contrast media     Nickel Rash     Recent Labs   Lab Test  17   1232  17   1051  16   1840   14   2341   13   2228   A1C  5.2   --    --    --    --    --    --    LDL   --   94   --    --    --    --    --    HDL   --   46*   --    --    --    --    --    TRIG   --   157*   --    --    --    --    --    ALT   --    --   20   --   24   --   39   CR   --    --   0.68   --   0.89   < >  0.95   GFRESTIMATED   --    --   >90  Non  GFR Calc     --   70   < >  66   GFRESTBLACK   --    --   >90   GFR Calc     --   85   < >  80   POTASSIUM   --    --   3.6   --   3.6   < >  3.9   TSH   --   0.02*  13.35*   < >   --    < >  0.35    < > = values in this  "interval not displayed.      BP Readings from Last 3 Encounters:   09/01/17 116/66   06/09/17 112/70   05/26/17 110/60    Wt Readings from Last 3 Encounters:   09/01/17 155 lb (70.3 kg)   06/09/17 160 lb (72.6 kg)   05/26/17 158 lb (71.7 kg)            Reviewed and updated as needed this visit by clinical staffTobacco  Allergies  Meds       Reviewed and updated as needed this visit by Provider         ROS:  Constitutional, HEENT, cardiovascular, pulmonary, gi and gu systems are negative, except as otherwise noted.      OBJECTIVE:   /66 (BP Location: Right arm, Patient Position: Sitting, Cuff Size: Adult Large)  Pulse 80  Temp 98.1  F (36.7  C) (Tympanic)  Resp 14  Ht 5' 2\" (1.575 m)  Wt 155 lb (70.3 kg)  BMI 28.35 kg/m2  Body mass index is 28.35 kg/(m^2).  GENERAL: healthy, alert and no distress  NECK: no adenopathy, no asymmetry, masses, or scars and thyroid normal to palpation  RESP: lungs clear to auscultation - no rales, rhonchi or wheezes  CV: regular rate and rhythm, normal S1 S2, no S3 or S4, no murmur, click or rub, no peripheral edema and peripheral pulses strong  MS: no gross musculoskeletal defects noted, no edema  NEURO: Normal strength and tone, mentation intact and speech normal  PSYCH: mentation appears normal, affect normal/bright         EKG Interpretation:      Interpreted by Maria Del Rosario Alvarez    Symptoms at time of EKG: None   Rhythm: Normal sinus   Rate: Normal  Axis: Normal  Ectopy: None  Conduction: Normal  ST Segments/ T Waves: No ST-T wave changes and No acute ischemic changes  Q Waves: None  Comparison to prior: Unchanged    Clinical Impression: normal EKG    Reviewed with Dr Driscoll.     ASSESSMENT/PLAN:       1. Menorrhagia with irregular cycle  symptomatic  - US Pelvic Complete with Transvaginal; Future  - OB/GYN REFERRAL for evaluation  - US Pelvic Complete with Transvaginal    2. Migraine without aura and without status migrainosus, not intractable  Chronic  - " take imitrex at onset of migraine headache  - MIGRAINE ACTION PLAN    3. Elevated glucose  A1c is normal at 5.2% (8/22/2017)  Referral to diabetes education for continuous glucose monitor study.    4. History of gestational diabetes mellitus (GDM)  - DIABETES EDUCATION REFERRAL ([place])    5. Palpitations  symptomatic  - EKG 12-lead complete w/read - (Clinic Performed)  - Zio Patch 48 Hours; Future  - Zio Patch 48 Hours      FUTURE APPOINTMENTS:       - Follow-up visit in 1 month or as needed for acute concerns.     Maria Del Rosario Alvarez, NP  [facility]

## 2017-09-02 ASSESSMENT — ANXIETY QUESTIONNAIRES: GAD7 TOTAL SCORE: 6

## 2017-09-06 ENCOUNTER — OFFICE VISIT (OUTPATIENT)
Dept: OBGYN | Facility: OTHER | Age: 42
End: 2017-09-06
Attending: NURSE PRACTITIONER
Payer: COMMERCIAL

## 2017-09-06 ENCOUNTER — TELEPHONE (OUTPATIENT)
Dept: FAMILY MEDICINE | Facility: OTHER | Age: 42
End: 2017-09-06

## 2017-09-06 ENCOUNTER — HOSPITAL ENCOUNTER (OUTPATIENT)
Dept: ULTRASOUND IMAGING | Facility: HOSPITAL | Age: 42
End: 2017-09-06
Attending: NURSE PRACTITIONER
Payer: COMMERCIAL

## 2017-09-06 ENCOUNTER — HOSPITAL ENCOUNTER (OUTPATIENT)
Dept: NUCLEAR MEDICINE | Facility: HOSPITAL | Age: 42
Discharge: HOME | End: 2017-09-06
Attending: NURSE PRACTITIONER | Admitting: NURSE PRACTITIONER
Payer: COMMERCIAL

## 2017-09-06 VITALS
HEIGHT: 62 IN | DIASTOLIC BLOOD PRESSURE: 74 MMHG | WEIGHT: 155 LBS | BODY MASS INDEX: 28.52 KG/M2 | OXYGEN SATURATION: 99 % | HEART RATE: 63 BPM | SYSTOLIC BLOOD PRESSURE: 118 MMHG

## 2017-09-06 DIAGNOSIS — Z11.3 SCREEN FOR STD (SEXUALLY TRANSMITTED DISEASE): Primary | ICD-10-CM

## 2017-09-06 DIAGNOSIS — N92.1 MENORRHAGIA WITH IRREGULAR CYCLE: ICD-10-CM

## 2017-09-06 PROCEDURE — 0296T ZZHC  EXT ECG > 48HR TO 21 DAY RCRD W/CONECT INTL RCRD: CPT | Mod: TC

## 2017-09-06 PROCEDURE — 76830 TRANSVAGINAL US NON-OB: CPT | Mod: TC

## 2017-09-06 PROCEDURE — 76856 US EXAM PELVIC COMPLETE: CPT | Mod: TC

## 2017-09-06 PROCEDURE — 0298T ZZC EXT ECG > 48HR TO 21 DAY REVIEW AND INTERPRETATN: CPT | Performed by: INTERNAL MEDICINE

## 2017-09-06 PROCEDURE — 99214 OFFICE O/P EST MOD 30 MIN: CPT | Performed by: NURSE PRACTITIONER

## 2017-09-06 PROCEDURE — 99213 OFFICE O/P EST LOW 20 MIN: CPT | Performed by: COUNSELOR

## 2017-09-06 ASSESSMENT — PAIN SCALES - GENERAL: PAINLEVEL: NO PAIN (0)

## 2017-09-06 NOTE — NURSING NOTE
"Chief Complaint   Patient presents with     Consult     Menorrhagia with Irregular Cycle       Initial /74  Pulse 63  Ht 5' 2\" (1.575 m)  Wt 155 lb (70.3 kg)  SpO2 99%  BMI 28.35 kg/m2 Estimated body mass index is 28.35 kg/(m^2) as calculated from the following:    Height as of this encounter: 5' 2\" (1.575 m).    Weight as of this encounter: 155 lb (70.3 kg).  Medication Reconciliation: complete     Tiny Moreno      "

## 2017-09-06 NOTE — MR AVS SNAPSHOT
After Visit Summary   9/6/2017    Gita Jones    MRN: [id number]           Patient Information     Date Of Birth          1975        Visit Information        Provider Department      9/6/2017 1:30 PM Hamida Avalos NP [facility]        Today's Diagnoses     Screen for STD (sexually transmitted disease)    -  1    Menorrhagia with irregular cycle          Care Instructions      Dysfunctional Uterine Bleeding    Dysfunctional uterine bleeding is a condition in which bleeding is abnormal and occurs at unexpected times of the month. This happens because of changes in the hormones that help control a woman s menstrual cycle each month.  The bleeding may be heavier or lighter than normal. If you have heavy bleeding often, this can lead to a problem called anemia. With anemia, your red blood cell count is too low. Red blood cells are needed because they help carry oxygen throughout your body. Severe anemia may cause you to look pale and feel very weak or tired. You might also become short of breath easily.  To treat dysfunctional uterine bleeding, medicines are often tried first. If these don t help, further testing and treatments may be needed. Discuss all of your options with your provider.  Home care  Medicines  If you re prescribed medicines, be sure to take them as directed. Some of the more common medicines you may be prescribed include:    Hormone therapy (Options include most methods of hormonal birth control such as pills, shots, or a hormone-releasing IUD)    Nonsteroidal anti-inflammatory drugs (NSAIDs), such as ibuprofen    Iron supplements, if you have anemia     General care    Get plenty of rest if you tire easily. Avoid heavy exertion.    To help relieve pain or cramping that may occur with bleeding, try using a heating pad on the lower belly or back. A warm bath may also help.  Follow-up care  Follow up with your healthcare provider as directed.  When to seek medical  advice  Call your healthcare provider right away if:    Bleeding becomes heavy (soaking 1 pad or tampon every hour for 3 hours)    Increased abdominal pain    Irregular bleeding worsens or does not get better even with treatment    Fever of 100.4 F (38 C) or higher, or as directed by your provider    Signs of anemia, such as pale skin, extreme fatigue or weakness, or shortness of breath    Dizziness or fainting   Date Last Reviewed: 6/11/2015 2000-2017 [employer]. [address] [place] [address]. All rights reserved. This information is not intended as a substitute for professional medical care. Always follow your healthcare professional's instructions.                Follow-ups after your visit        Your next 10 appointments already scheduled     Sep 12, 2017  1:30 PM CDT   (Arrive by 1:15 PM)   Continuous Glucose Monitor Detach with Hc Dm Nurse   [facility] ([facility] )    [address] [place] [address]   [phone]            Sep 26, 2017  2:00 PM CDT   (Arrive by 1:45 PM)   Office Visit with Werner Monterroso MD   [facility] ([facility] )    [address] [place] [address]   [phone]           Bring a current list of meds and any records pertaining to this visit.  For Physicals, please bring immunization records and any forms needing to be filled out.  Please arrive 15 minutes early to complete paperwork and register            Sep 27, 2017 10:00 AM CDT   (Arrive by 9:45 AM)   Continuous Glucose Monitor Evaluation with Promise Marroquin NP   [facility] ([facility] )    [address] [place] [address]   [phone]            Oct 04, 2017 10:15 AM CDT   (Arrive by 10:00 AM)   SHORT with Maria Del Rosario Alvarez NP   [facility] ([facility] )    [address]  [place] [address]   [phone]          "     Who to contact     If you have questions or need follow up information about today's clinic visit or your schedule please contact [facility] directly at [phone].  Normal or non-critical lab and imaging results will be communicated to you by MyChart, letter or phone within 4 business days after the clinic has received the results. If you do not hear from us within 7 days, please contact the clinic through MyChart or phone. If you have a critical or abnormal lab result, we will notify you by phone as soon as possible.  Submit refill requests through HereOrThere or call your pharmacy and they will forward the refill request to us. Please allow 3 business days for your refill to be completed.          Additional Information About Your Visit        HereOrThere Information     HereOrThere gives you secure access to your electronic health record. If you see a primary care provider, you can also send messages to your care team and make appointments. If you have questions, please call your primary care clinic.  If you do not have a primary care provider, please call [phone] and they will assist you.        Care EveryWhere ID     This is your Care EveryWhere ID. This could be used by other organizations to access your [place] medical records  [id number]        Your Vitals Were     Pulse Height Pulse Oximetry BMI (Body Mass Index)          63 5' 2\" (1.575 m) 99% 28.35 kg/m2         Blood Pressure from Last 3 Encounters:   09/08/17 121/77   09/06/17 118/74   09/01/17 116/66    Weight from Last 3 Encounters:   09/08/17 158 lb 14.4 oz (72.1 kg)   09/06/17 155 lb (70.3 kg)   09/01/17 155 lb (70.3 kg)              Today, you had the following     No orders found for display       Primary Care Provider Office Phone # Fax #    Maria Del Rosario Alvarez [phone]       [facility] [address] [place]  [facility] [address]        Equal Access to Services     CHANCE LEONARD AH: Lindaii shi del rosario " celina Mcclellan, andreasda luyaniadaha, qabryceta kagerardo montelongo, chandni luisin hayaan maryjanejane lovelytyrone lachadsavana mario. So [facility] [phone].    ATENCIÓN: Si habla español, tiene a severino disposición servicios gratuitos de asistencia lingüística. Surinder al [phone].    We comply with applicable federal civil rights laws and [place] laws. We do not discriminate on the basis of race, color, national origin, age, disability sex, sexual orientation or gender identity.            Thank you!     Thank you for choosing [facility]  for your care. Our goal is always to provide you with excellent care. Hearing back from our patients is one way we can continue to improve our services. Please take a few minutes to complete the written survey that you may receive in the mail after your visit with us. Thank you!             Your Updated Medication List - Protect others around you: Learn how to safely use, store and throw away your medicines at www.disposemymeds.org.          This list is accurate as of: 9/6/17 11:59 PM.  Always use your most recent med list.                   Brand Name Dispense Instructions for use Diagnosis    levothyroxine 175 MCG tablet    SYNTHROID/LEVOTHROID    90 tablet    TAKE 1 TABLET(175 MCG) BY MOUTH DAILY    Acquired hypothyroidism       multivitamin, therapeutic with minerals Tabs tablet      Take 1 tablet by mouth daily.        SUMAtriptan 25 MG tablet    IMITREX     Take 25 mg by mouth

## 2017-09-08 ENCOUNTER — HOSPITAL ENCOUNTER (OUTPATIENT)
Dept: EDUCATION SERVICES | Facility: HOSPITAL | Age: 42
Discharge: HOME | End: 2017-09-08
Attending: NURSE PRACTITIONER | Admitting: NURSE PRACTITIONER
Payer: COMMERCIAL

## 2017-09-08 VITALS
DIASTOLIC BLOOD PRESSURE: 77 MMHG | SYSTOLIC BLOOD PRESSURE: 121 MMHG | HEART RATE: 79 BPM | WEIGHT: 158.9 LBS | HEIGHT: 62 IN | OXYGEN SATURATION: 99 % | BODY MASS INDEX: 29.24 KG/M2

## 2017-09-08 DIAGNOSIS — R73.09 ELEVATED GLUCOSE: Primary | ICD-10-CM

## 2017-09-08 PROBLEM — R73.9 BLOOD GLUCOSE ELEVATED: Status: ACTIVE | Noted: 2017-09-08

## 2017-09-08 PROCEDURE — 97802 MEDICAL NUTRITION INDIV IN: CPT | Performed by: DIETITIAN, REGISTERED

## 2017-09-08 ASSESSMENT — PAIN SCALES - GENERAL: PAINLEVEL: NO PAIN (0)

## 2017-09-08 NOTE — PROGRESS NOTES
Pt is here today for CGM hook up.      Pt states she is almost out of test strips for her meter as she only had the ones that came with it.  She also states her lancing device is broken.  Pt is unsure of what meter she has but recognizes the One Touch Delica lancing device.      Provided pt with a One Touch Verio Flex glucose meter and instructed her on use.  Sample of 10 lancets and 20 testing strips provided.  Script sent to [facility] in [facility] for supplies.      Total time spent with pt was 15 minutes.

## 2017-09-08 NOTE — PROGRESS NOTES
CC:  Consult from ELZA Davis NP for menorrhagia  HPI:  Gita Jones is a 41 year old female is a   P7 with 2 c-sections. .  No LMP recorded.  Menses are irregular for the past 9 years.  Most recently she has experienced 2 months of spotting with occasional quarter size clots.  She denies n/v/d with menses.  Some spotting with intercourse but no pain. Previously seen for the  Same in  by Dr. Monterroso and was considering an endometrial ablation.  She would like to discuss possibly doing this now.     Past GYN history:  No STD history       Last PAP smear:  Normal      Past Medical History:   Diagnosis Date     Abdominal pain, unspecified site 2002     Abnormal feces 2004     Abnormal maternal glucose tolerance, antepartum 2001     Acute sinusitis, unspecified 2005     Chest pain, unspecified 2003     Diarrhea 2004     Dizziness and giddiness 2008     Headache(784.0) 2002     History of pelvic mass 2014     hypothyroidism 2012     Irregular menstrual cycle 2003     Migraine, unspecified, without mention of intractable migraine without mention of status migrainosus 2004     Other disorder of menstruation and other abnormal bleeding from female genital tract 2006     Other malaise and fatigue 2002     Other specified hemorrhage in early pregnancy, antepartum 2000     Social anxiety disorder 2015     Supervision of other normal pregnancy 2000     Syncope and collapse 2003     Unspecified symptom associated with female genital organs 2002       Past Surgical History:   Procedure Laterality Date     APPENDECTOMY        SECTION        SECTION       HERNIORRHAPHY UMBILICAL  2013    Procedure: HERNIORRHAPHY UMBILICAL;  OPEN SUPRA UMBILICAL HERNIA REPAIR WITH MESH;  Surgeon: Shante Goss DO;  Location: [facility]     lymph node bx neck-benign         Family History   Problem Relation Age of Onset      "CANCER Mother 54     cause of death     DIABETES Father      Lipids Father      hyperlipidemia     Hypertension Father      CEREBROVASCULAR DISEASE Father      Neurologic Disorder Son      seizures       Allergies: Dye [contrast dye] and Nickel    Current Outpatient Prescriptions   Medication Sig Dispense Refill     levothyroxine (SYNTHROID/LEVOTHROID) 175 MCG tablet TAKE 1 TABLET(175 MCG) BY MOUTH DAILY 90 tablet 0     SUMAtriptan (IMITREX) 25 MG tablet Take 25 mg by mouth       multivitamin, therapeutic with minerals (THERA-VIT-M) TABS Take 1 tablet by mouth daily.       blood glucose monitoring (ONE TOUCH DELICA) lancets Use to test blood sugar 4 times daily. 100 each 3     blood glucose monitoring (ONE TOUCH VERIO IQ) test strip Use to test blood sugar 4 times daily. 100 each 3       ROS:  C: NEGATIVE for fever, chills, change in weight  GI: NEGATIVE for nausea, abdominal pain, heartburn, or change in bowel habits  : NEGATIVE for frequency, dysuria, hematuria, vaginal discharge  N: NEGATIVE for weakness, dizziness or paresthesias  E: NEGATIVE for temperature intolerance, skin/hair changes  P: NEGATIVE for changes in mood or affect    EXAM:  Blood pressure 118/74, pulse 63, height 5' 2\" (1.575 m), weight 155 lb (70.3 kg), SpO2 99 %, not currently breastfeeding.   BMI= Body mass index is 28.35 kg/(m^2).  General - pleasant female in no acute distress.  Neck - supple without lymphadenopathy or thyromegaly.  Abdomen - soft, nontender, nondistended, no hepatosplenomegaly.  Pelvic - EG: normal adult female, BUS: within normal limits, Vagina: well rugated, no discharge, Cervix: no lesions or CMT, Uterus: firm, normal sized and nontender, Adnexae: no masses or tenderness.  Rectovaginal - deferred.  Musculoskeletal - no gross deformities.  Neurological - normal strength, sensation, and mental status.      ASSESSMENT/PLAN:  (Z11.3) Screen for STD (sexually transmitted disease)  (primary encounter diagnosis)  Comment: "   Plan: CANCELED: Wet prep, CANCELED: Chlamydia         trachomatis PCR, CANCELED: Neisseria         gonorrhoeae PCR            (N92.1) Menorrhagia with irregular cycle  Comment:   Plan: labs and pap up to date.  Pelvic US preciously completed but results not yet available.  Schedule with Dr Monterroso to discuss scheduling ablation.         Letter will be sent to the referring provider    OLYA Davis

## 2017-09-08 NOTE — NURSING NOTE
"Chief Complaint   Patient presents with     Diabetes     pre diabetes       Initial /77 (BP Location: Right arm, Patient Position: Chair, Cuff Size: Adult Large)  Pulse 79  Ht 1.575 m (5' 2\")  Wt 72.1 kg (158 lb 14.4 oz)  SpO2 99%  BMI 29.06 kg/m2 Estimated body mass index is 29.06 kg/(m^2) as calculated from the following:    Height as of this encounter: 1.575 m (5' 2\").    Weight as of this encounter: 72.1 kg (158 lb 14.4 oz).  Medication Reconciliation: complete   Paulina Orona      "

## 2017-09-08 NOTE — NURSING NOTE
Gita Rosado is here for a glucose sensor insertion for a CGMS study. Sensor agreement signed.    Sensor attached to R flank. No redness, drainage or bleeding noted. Pt instructed on testing schedule, how to complete the patient log, restrictions during wear, and to remove if needs to have MRI, CT or x-ray. Pt will return on 9/12/17 for detach and 9/27/17 for evaluation.     Sensor Lot #: [id number]  Exp date: 12/19/2017  Transmitter S/N:  [id number]  Hook up time: 11:00 am    First BG to be done at: 12:00 pm    Written instructions and patient log given to patient.  Paulina Orona

## 2017-09-12 ENCOUNTER — APPOINTMENT (OUTPATIENT)
Dept: WOUND CARE | Facility: OTHER | Age: 42
End: 2017-09-12
Attending: NURSE PRACTITIONER
Payer: COMMERCIAL

## 2017-09-12 NOTE — PATIENT INSTRUCTIONS
Dysfunctional Uterine Bleeding    Dysfunctional uterine bleeding is a condition in which bleeding is abnormal and occurs at unexpected times of the month. This happens because of changes in the hormones that help control a woman s menstrual cycle each month.  The bleeding may be heavier or lighter than normal. If you have heavy bleeding often, this can lead to a problem called anemia. With anemia, your red blood cell count is too low. Red blood cells are needed because they help carry oxygen throughout your body. Severe anemia may cause you to look pale and feel very weak or tired. You might also become short of breath easily.  To treat dysfunctional uterine bleeding, medicines are often tried first. If these don t help, further testing and treatments may be needed. Discuss all of your options with your provider.  Home care  Medicines  If you re prescribed medicines, be sure to take them as directed. Some of the more common medicines you may be prescribed include:    Hormone therapy (Options include most methods of hormonal birth control such as pills, shots, or a hormone-releasing IUD)    Nonsteroidal anti-inflammatory drugs (NSAIDs), such as ibuprofen    Iron supplements, if you have anemia     General care    Get plenty of rest if you tire easily. Avoid heavy exertion.    To help relieve pain or cramping that may occur with bleeding, try using a heating pad on the lower belly or back. A warm bath may also help.  Follow-up care  Follow up with your healthcare provider as directed.  When to seek medical advice  Call your healthcare provider right away if:    Bleeding becomes heavy (soaking 1 pad or tampon every hour for 3 hours)    Increased abdominal pain    Irregular bleeding worsens or does not get better even with treatment    Fever of 100.4 F (38 C) or higher, or as directed by your provider    Signs of anemia, such as pale skin, extreme fatigue or weakness, or shortness of breath    Dizziness or  fainting   Date Last Reviewed: 6/11/2015 2000-2017 [employer]. [address] [place] [address], [place] [address]. All rights reserved. This information is not intended as a substitute for professional medical care. Always follow your healthcare professional's instructions.

## 2017-09-26 ENCOUNTER — OFFICE VISIT (OUTPATIENT)
Dept: OBGYN | Facility: OTHER | Age: 42
End: 2017-09-26
Attending: OBSTETRICS & GYNECOLOGY
Payer: COMMERCIAL

## 2017-09-26 VITALS
HEIGHT: 62 IN | HEART RATE: 71 BPM | DIASTOLIC BLOOD PRESSURE: 73 MMHG | WEIGHT: 153 LBS | BODY MASS INDEX: 28.16 KG/M2 | SYSTOLIC BLOOD PRESSURE: 109 MMHG | OXYGEN SATURATION: 98 %

## 2017-09-26 DIAGNOSIS — N93.9 ABNORMAL UTERINE BLEEDING: Primary | ICD-10-CM

## 2017-09-26 DIAGNOSIS — R00.2 PALPITATIONS: Primary | ICD-10-CM

## 2017-09-26 DIAGNOSIS — Z23 NEED FOR PROPHYLACTIC VACCINATION AND INOCULATION AGAINST INFLUENZA: ICD-10-CM

## 2017-09-26 DIAGNOSIS — Z23 NEED FOR VACCINATION: ICD-10-CM

## 2017-09-26 DIAGNOSIS — N92.1 MENORRHAGIA WITH IRREGULAR CYCLE: ICD-10-CM

## 2017-09-26 PROCEDURE — 99214 OFFICE O/P EST MOD 30 MIN: CPT | Mod: 25 | Performed by: OBSTETRICS & GYNECOLOGY

## 2017-09-26 PROCEDURE — 90472 IMMUNIZATION ADMIN EACH ADD: CPT | Performed by: OBSTETRICS & GYNECOLOGY

## 2017-09-26 PROCEDURE — 90471 IMMUNIZATION ADMIN: CPT | Performed by: OBSTETRICS & GYNECOLOGY

## 2017-09-26 PROCEDURE — 58100 BIOPSY OF UTERUS LINING: CPT | Performed by: OBSTETRICS & GYNECOLOGY

## 2017-09-26 PROCEDURE — 99213 OFFICE O/P EST LOW 20 MIN: CPT

## 2017-09-26 PROCEDURE — 88305 TISSUE EXAM BY PATHOLOGIST: CPT | Mod: TC | Performed by: OBSTETRICS & GYNECOLOGY

## 2017-09-26 ASSESSMENT — PAIN SCALES - GENERAL: PAINLEVEL: NO PAIN (0)

## 2017-09-26 NOTE — NURSING NOTE
"Chief Complaint   Patient presents with     Consult     Bailey/ ablation       Initial /73 (BP Location: Left arm, Cuff Size: Adult Regular)  Pulse 71  Ht 5' 2\" (1.575 m)  Wt 153 lb (69.4 kg)  SpO2 98%  BMI 27.98 kg/m2 Estimated body mass index is 27.98 kg/(m^2) as calculated from the following:    Height as of this encounter: 5' 2\" (1.575 m).    Weight as of this encounter: 153 lb (69.4 kg).  Medication Reconciliation: complete     Rosalina Del Rio      "

## 2017-09-26 NOTE — MR AVS SNAPSHOT
After Visit Summary   9/26/2017    Gita Jones    MRN: [id number]           Patient Information     Date Of Birth          1975        Visit Information        Provider Department      9/26/2017 2:00 PM Werner Monterroso MD [facility]        Today's Diagnoses     Abnormal uterine bleeding    -  1    Need for vaccination        Need for prophylactic vaccination and inoculation against influenza        Menorrhagia with irregular cycle           Follow-ups after your visit        Your next 10 appointments already scheduled     Oct 04, 2017 10:15 AM CDT   (Arrive by 10:00 AM)   SHORT with Maria Del Rosario Alvarez NP   [facility] ([facility] )    [address]  [place] [address]   [phone]            Oct 06, 2017  9:30 AM CDT   (Arrive by 9:15 AM)   New Visit with Kris Schneider,    [facility] [place] ([facility] [place] )    [address]  [place] [id number] [phone]            Oct 06, 2017 11:00 AM CDT   (Arrive by 10:45 AM)   Continuous Glucose Monitor Evaluation with Promise Marroquin NP   [facility] [place] ([facility] [place] )    [address]  [place] [id number] [phone]              Who to contact     If you have questions or need follow up information about today's clinic visit or your schedule please contact [facility] directly at [phone].  Normal or non-critical lab and imaging results will be communicated to you by MyChart, letter or phone within 4 business days after the clinic has received the results. If you do not hear from us within 7 days, please contact the clinic through MyChart or phone. If you have a critical or abnormal lab result, we will notify you by phone as soon as possible.  Submit refill requests through [employer] or call your pharmacy and they will forward the refill request to us. Please allow 3 business days for  "your refill to be completed.          Additional Information About Your Visit        MyChart Information     [employer] gives you secure access to your electronic health record. If you see a primary care provider, you can also send messages to your care team and make appointments. If you have questions, please call your primary care clinic.  If you do not have a primary care provider, please call [phone] and they will assist you.        Care EveryWhere ID     This is your Care EveryWhere ID. This could be used by other organizations to access your [place] medical records  [id number]        Your Vitals Were     Pulse Height Pulse Oximetry BMI (Body Mass Index)          71 5' 2\" (1.575 m) 98% 27.98 kg/m2         Blood Pressure from Last 3 Encounters:   09/26/17 109/73   09/08/17 121/77   09/06/17 118/74    Weight from Last 3 Encounters:   09/26/17 153 lb (69.4 kg)   09/08/17 158 lb 14.4 oz (72.1 kg)   09/06/17 155 lb (70.3 kg)              We Performed the Following     1st  Administration  [[id number]]     ENDOMETRIAL BIOPSY W/O CERVICAL DILATION     Surgical pathology exam     Vaccine Administration, Each Additional [[id number]]        Primary Care Provider Office Phone # Fax #    Maria Del Rosario Alvarez [phone]       [facility] [place] [address] [facility] [address]        Equal Access to Services     CHANCE LEONARD : Hadii shi del rosario hadasho Soomaali, waaxda luqadaha, qaybta kaalmada adeegyada, chandni mchugh . So [facility] [phone].    ATENCIÓN: Si habla español, tiene a severino disposición servicios gratuitos de asistencia lingüística. Surinder al [phone].    We comply with applicable federal civil rights laws and [place] laws. We do not discriminate on the basis of race, color, national origin, age, disability sex, sexual orientation or gender identity.            Thank you!     Thank you for choosing [facility]  for your care. Our goal is always " to provide you with excellent care. Hearing back from our patients is one way we can continue to improve our services. Please take a few minutes to complete the written survey that you may receive in the mail after your visit with us. Thank you!             Your Updated Medication List - Protect others around you: Learn how to safely use, store and throw away your medicines at www.disposemymeds.org.          This list is accurate as of: 9/26/17 11:59 PM.  Always use your most recent med list.                   Brand Name Dispense Instructions for use Diagnosis    blood glucose monitoring lancets     100 each    Use to test blood sugar 4 times daily.    Elevated glucose       blood glucose monitoring test strip    ONE TOUCH VERIO IQ    100 each    Use to test blood sugar 4 times daily.    Elevated glucose       levothyroxine 175 MCG tablet    SYNTHROID/LEVOTHROID    90 tablet    TAKE 1 TABLET(175 MCG) BY MOUTH DAILY    Acquired hypothyroidism       multivitamin, therapeutic with minerals Tabs tablet      Take 1 tablet by mouth daily.        SUMAtriptan 25 MG tablet    IMITREX     Take 25 mg by mouth

## 2017-09-26 NOTE — PROGRESS NOTES
Injectable Influenza Immunization Documentation    1.  Is the person to be vaccinated sick today?   No    2. Does the person to be vaccinated have an allergy to a component   of the vaccine?   No    3. Has the person to be vaccinated ever had a serious reaction   to influenza vaccine in the past?   No    4. Has the person to be vaccinated ever had Guillain-Barré syndrome?   No    Form completed by Rosalina Del Rio

## 2017-09-28 ENCOUNTER — PRE VISIT (OUTPATIENT)
Dept: CARDIOLOGY | Facility: OTHER | Age: 42
End: 2017-09-28

## 2017-09-28 LAB — COPATH REPORT: NORMAL

## 2017-09-28 NOTE — PROGRESS NOTES
CC:  Consult from Hamida Avalos NP for mennorhagia  HPI:  Gita Jones is a 42 year old female P7(2cs, 5 vag). No LMP recorded..  Menses are irregular lasting 5 days with 3 of heavy flow.  Her menstrual flow is limiting her clothing choices and interferes with lifestyle/activites. Previous evaluation .  Recent US/TSH wnl.  Pap UTD.         Clots: Yes  Intermenstrual bleeding: Yes, occasional  Post-coital bleeding: No  Previous work-up:Yes  Contraception: BTO  Abnormal Pap: No  Dysmenorrhea: No  Pelvic pain:No  Dyspareunia: No    Past GYN history:        Last PAP smear:  Normal    Patients records are available and reviewed at today's visit.    Past Medical History:   Diagnosis Date     Abdominal pain, unspecified site 2002     Abnormal feces 2004     Abnormal maternal glucose tolerance, antepartum 2001     Acute sinusitis, unspecified 2005     Chest pain, unspecified 2003     Diarrhea 2004     Dizziness and giddiness 2008     Headache(784.0) 2002     History of pelvic mass 2014     hypothyroidism 2012     Irregular menstrual cycle 2003     Migraine, unspecified, without mention of intractable migraine without mention of status migrainosus 2004     Other disorder of menstruation and other abnormal bleeding from female genital tract 2006     Other malaise and fatigue 2002     Other specified hemorrhage in early pregnancy, antepartum 2000     Social anxiety disorder 2015     Supervision of other normal pregnancy 2000     Syncope and collapse 2003     Unspecified symptom associated with female genital organs 2002       Past Surgical History:   Procedure Laterality Date     APPENDECTOMY        SECTION  2007      SECTION  2005     HERNIORRHAPHY UMBILICAL  2013    Procedure: HERNIORRHAPHY UMBILICAL;  OPEN SUPRA UMBILICAL HERNIA REPAIR WITH MESH;  Surgeon: Shante Goss DO;  Location: [facility]     lymph  "node bx neck-benign         Family History   Problem Relation Age of Onset     CANCER Mother 54     cause of death     DIABETES Father      Lipids Father      hyperlipidemia     Hypertension Father      CEREBROVASCULAR DISEASE Father      Neurologic Disorder Son      seizures       Current Outpatient Prescriptions   Medication Sig Dispense Refill     levothyroxine (SYNTHROID/LEVOTHROID) 175 MCG tablet TAKE 1 TABLET(175 MCG) BY MOUTH DAILY 90 tablet 0     multivitamin, therapeutic with minerals (THERA-VIT-M) TABS Take 1 tablet by mouth daily.       blood glucose monitoring (ONE TOUCH DELICA) lancets Use to test blood sugar 4 times daily. (Patient not taking: Reported on 9/26/2017) 100 each 3     blood glucose monitoring (ONE TOUCH VERIO IQ) test strip Use to test blood sugar 4 times daily. (Patient not taking: Reported on 9/26/2017) 100 each 3     SUMAtriptan (IMITREX) 25 MG tablet Take 25 mg by mouth         Allergies: Dye [contrast dye] and Nickel    ROS:  C: NEGATIVE for fever, chills, change in weight  R: NEGATIVE for significant cough or SOB  CV: NEGATIVE for chest pain, palpitations or peripheral edema  GI: NEGATIVE for nausea, abdominal pain, heartburn, or change in bowel habits  : NEGATIVE for frequency, dysuria, hematuria, vaginal discharge  P: NEGATIVE for changes in mood or affect    EXAM:  Blood pressure 109/73, pulse 71, height 5' 2\" (1.575 m), weight 153 lb (69.4 kg), SpO2 98 %, not currently breastfeeding.   BMI= Body mass index is 27.98 kg/(m^2).  General - pleasant female in no acute distress.  Abdomen - soft, nontender, nondistended, no hepatosplenomegaly.  Pelvic - EG: normal adult female, BUS: within normal limits, Vagina: well rugated, no discharge, Cervix: no lesions or CMT, Uterus: firm,  normal sized and nontender, Adnexae: no masses or tenderness.  Rectovaginal - deferred.  Musculoskeletal - no gross deformities or edema  Neurological - normal mental status.  PROCEDURE:  After informed " consent was obtained from the patient, a speculum was placed in the vagina to visualize the cervix.  The cervix was then swabbed with a betadine.  Tenaculum was applied to the anterior cervical lip. Endometrial biopsy pipelle was passed through the cervical os and tissue obtained.  Uterus sounded to 8 cm.  Tenaculum was removed and sites were hemostatic. There were no complications. The patient tolerated the procedure well with a minimal amount of cramping noted.  Specimen was sent to pathology.      ASSESSMENT/PLAN:  (N93.9) Abnormal uterine bleeding  (primary encounter diagnosis)  Comment: Menorrhagia  Plan: Surgical pathology exam/endometrial biopsy pending  Discussed expectant,  medical, IUD and and surgical options(endometrial ablation/hysterectomy). Pt would like to review IUD and ablation info I have given her and will f/u by phone with endometrial biopsy results available and proceed accordingly. 25 minutes were spent with the patient with greater than 50% of the visit spent in face-to-face counseling and coordination of care.            (Z23) Need for prophylactic vaccination and inoculation against influenza  Plan: 1st  Administration  [04307], Vaccine         Administration

## 2017-09-28 NOTE — TELEPHONE ENCOUNTER
"Office Visit     9/1/2017  [facility]    Maria Del Rosario Alvarez, STEPHANIE   Family Practice    Menorrhagia with irregular cycle +4 more   Dx    Other , Headache    Reason for visit    Progress Notes   Maria Del Rosario Alvarez, STEPHANIE (Nurse Practitioner)     Family Practice   Expand All Collapse All       SUBJECTIVE:   Gita Jones is a 41 year old female who presents to clinic today for the following health issues:     Gita presents today with weakness, blurry vision.  All symptoms started 5 days ago with heavy vaginal bleeding.  She developed a migraine that lasted 3 days;  She took imitrex on the third day with relief.       Migraine Follow-Up    Headaches symptoms:  Worsened     Frequency: 2x per month     Duration of headaches: 3 days    Able to do normal daily activities/work with migraines: No -     Rescue/Relief medication:sumatriptan (Imitrex)              Effectiveness: almost total relief    Preventative medication: None    Neurologic complications: No new stroke-like symptoms, loss of vision or speech, numbness or weakness    In the past 4 weeks, how often have you gone to Urgent Care or the emergency room because of your headaches?  1          Amount of exercise or physical activity: 6-7 days/week for an average of 45-60 minutes    Problems taking medications regularly: No    Medication side effects: none  Diet: regular (no restrictions)        ED/UC Followup:     Facility:  [facility]  Date of visit: 8/31/17  Reason for visit: Migraine - incidental finding elevated blood sugar  Current Status: Patient reports eye issues and weak.      Consent obtained, [facility] medical record is reviewed.  Glucose was 102; HGB 13.9, kidney function normal, lytes normal.  UA normal     She has felt \"flutter\" in her check that is getting more frequent.  She has a history of arrhythmia, was on a medication on her 20's but has been off for several years.       Problem list and histories reviewed & adjusted, as " indicated.  Additional history: as documented         Patient Active Problem List   Diagnosis     Hypothyroidism     History of pelvic mass     Constipation     Social anxiety disorder     Migraine     ACP (advance care planning)     Migraine without aura and without status migrainosus, not intractable      Past Surgical History            Past Surgical History:   Procedure Laterality Date     APPENDECTOMY          SECTION   2007      SECTION        HERNIORRHAPHY UMBILICAL   2013     Procedure: HERNIORRHAPHY UMBILICAL;  OPEN SUPRA UMBILICAL HERNIA REPAIR WITH MESH;  Surgeon: Shante Goss DO;  Location: [facility]     lymph node bx neck-benign                      Social History   Substance Use Topics     Smoking status: Former Smoker       Packs/day: 2.00       Years: 10.00       Types: Cigarettes     Smokeless tobacco: Never Used         Comment: quit in . no passive exposure.     Alcohol use No      Family History             Family History   Problem Relation Age of Onset     CANCER Mother 54       cause of death     DIABETES Father       Lipids Father         hyperlipidemia     Hypertension Father       CEREBROVASCULAR DISEASE Father       Neurologic Disorder Son         seizures              Current Outpatient Prescriptions           Current Outpatient Prescriptions   Medication Sig Dispense Refill     levothyroxine (SYNTHROID/LEVOTHROID) 175 MCG tablet TAKE 1 TABLET(175 MCG) BY MOUTH DAILY 90 tablet 0     SUMAtriptan (IMITREX) 25 MG tablet Take 25 mg by mouth         multivitamin, therapeutic with minerals (THERA-VIT-M) TABS Take 1 tablet by mouth daily.                   Allergies   Allergen Reactions     Dye [Contrast Dye] Rash       IV dye, iodine containing contrast media     Nickel Rash                Recent Labs   Lab Test  17   1232  17   1051  16   1840    14   2341    13   2228   A1C  5.2   --    --    --    --    --    --    LDL   --   94   --   "  --    --    --    --    HDL   --   46*   --    --    --    --    --    TRIG   --   157*   --    --    --    --    --    ALT   --    --   20   --   24   --   39   CR   --    --   0.68   --   0.89   < >  0.95   GFRESTIMATED   --    --   >90  Non  GFR Calc   --   70   < >  66   GFRESTBLACK   --    --   >90   GFR Calc   --   85   < >  80   POTASSIUM   --    --   3.6   --   3.6   < >  3.9   TSH   --   0.02*  13.35*   < >   --    < >  0.35    < > = values in this interval not displayed.          BP Readings from Last 3 Encounters:   09/01/17 116/66   06/09/17 112/70   05/26/17 110/60         Wt Readings from Last 3 Encounters:   09/01/17 155 lb (70.3 kg)   06/09/17 160 lb (72.6 kg)   05/26/17 158 lb (71.7 kg)              Reviewed and updated as needed this visit by clinical staffTobacco  Allergies  Meds       Reviewed and updated as needed this visit by Provider           ROS:  Constitutional, HEENT, cardiovascular, pulmonary, gi and gu systems are negative, except as otherwise noted.        OBJECTIVE:   /66 (BP Location: Right arm, Patient Position: Sitting, Cuff Size: Adult Large)  Pulse 80  Temp 98.1  F (36.7  C) (Tympanic)  Resp 14  Ht 5' 2\" (1.575 m)  Wt 155 lb (70.3 kg)  BMI 28.35 kg/m2  Body mass index is 28.35 kg/(m^2).  GENERAL: healthy, alert and no distress  NECK: no adenopathy, no asymmetry, masses, or scars and thyroid normal to palpation  RESP: lungs clear to auscultation - no rales, rhonchi or wheezes  CV: regular rate and rhythm, normal S1 S2, no S3 or S4, no murmur, click or rub, no peripheral edema and peripheral pulses strong  MS: no gross musculoskeletal defects noted, no edema  NEURO: Normal strength and tone, mentation intact and speech normal  PSYCH: mentation appears normal, affect normal/bright          EKG Interpretation:       Interpreted by Maria Del Rosario M. Couture-Knuti     Symptoms at time of EKG: None   Rhythm: Normal sinus   Rate: Normal  Axis: " "Normal  Ectopy: None  Conduction: Normal  ST Segments/ T Waves: No ST-T wave changes and No acute ischemic changes  Q Waves: None  Comparison to prior: Unchanged     Clinical Impression: normal EKG     Reviewed with Dr Driscoll.      ASSESSMENT/PLAN:         1. Menorrhagia with irregular cycle  symptomatic  - US Pelvic Complete with Transvaginal; Future  - OB/GYN REFERRAL for evaluation  - US Pelvic Complete with Transvaginal     2. Migraine without aura and without status migrainosus, not intractable  Chronic  - take imitrex at onset of migraine headache  - MIGRAINE ACTION PLAN     3. Elevated glucose  A1c is normal at 5.2% (8/22/2017)  Referral to diabetes education for continuous glucose monitor study.     4. History of gestational diabetes mellitus (GDM)  - DIABETES EDUCATION REFERRAL ([place])     5. Palpitations  symptomatic  - EKG 12-lead complete w/read - (Clinic Performed)  - Zio Patch 48 Hours; Future  - Zio Patch 48 Hours        FUTURE APPOINTMENTS:       - Follow-up visit in 1 month or as needed for acute concerns.      Maria Del Rosario Alvarez NP  [facility]         Nursing Note   Margot Padron LPN (Licensed Nurse)           Chief Complaint   Patient presents with     Other       Elevated BS     Headache       Migraine action due         Initial /66 (BP Location: Right arm, Patient Position: Sitting, Cuff Size: Adult Large)  Pulse 80  Temp 98.1  F (36.7  C) (Tympanic)  Resp 14  Ht 5' 2\" (1.575 m)  Wt 155 lb (70.3 kg)  BMI 28.35 kg/m2 Estimated body mass index is 28.35 kg/(m^2) as calculated from the following:    Height as of this encounter: 5' 2\" (1.575 m).    Weight as of this encounter: 155 lb (70.3 kg).  Medication Reconciliation: complete   Margot Padron            Instructions        Return if symptoms worsen or fail to improve.      Allergies   Allergen Reactions     Dye [Contrast Dye] Rash     IV dye, iodine containing contrast media     Nickel Rash     Current " Outpatient Prescriptions   Medication     blood glucose monitoring (ONE TOUCH DELICA) lancets     blood glucose monitoring (ONE TOUCH VERIO IQ) test strip     levothyroxine (SYNTHROID/LEVOTHROID) 175 MCG tablet     SUMAtriptan (IMITREX) 25 MG tablet     multivitamin, therapeutic with minerals (THERA-VIT-M) TABS     No current facility-administered medications for this visit.      Family History   Problem Relation Age of Onset     CANCER Mother 54     cause of death     DIABETES Father      Lipids Father      hyperlipidemia     Hypertension Father      CEREBROVASCULAR DISEASE Father      Neurologic Disorder Son      seizures     Social History     Social History     Marital status:      Spouse name: N/A     Number of children: N/A     Years of education: N/A     Occupational History     Not on file.     Social History Main Topics     Smoking status: Former Smoker     Packs/day: 2.00     Years: 10.00     Types: Cigarettes     Smokeless tobacco: Never Used      Comment: quit in 2001. no passive exposure.     Alcohol use No     Drug use: No     Sexual activity: Not on file     Other Topics Concern     Blood Transfusions Yes     Caffeine Concern Yes     coffee >6 cups per day     Exercise Yes     daily     Seat Belt Yes     Parent/Sibling W/ Cabg, Mi Or Angioplasty Before 65f 55m? No     Social History Narrative     History   Smoking Status     Former Smoker     Packs/day: 2.00     Years: 10.00     Types: Cigarettes   Smokeless Tobacco     Never Used     Comment: quit in 2001. no passive exposure.     Estimated Average Glucose   Collected:  8/22/2017 12:32 PM Order: [id number]              Ref Range & Units 1mo ago       Estimated Average Glucose mg/dL 103     View Full Report                        Hemoglobin A1c   Collected:  8/22/2017 12:32 PM Order: [id number]              Ref Range & Units 1mo ago       Hemoglobin A1C 4.3 - 6.0 % 5.2     View Full Report                        T4 free   Collected:  5/26/2017  10:51 AM Order: [id number]              Ref Range & Units 4mo ago       T4 Free 0.76 - 1.46 ng/dL 1.43     View Full Report                        CBC with platelets   Collected:  5/26/2017 10:51 AM Order: [id number]              Ref Range & Units 4mo ago       WBC 4.0 - 11.0 10e9/L 6.3     RBC Count 3.8 - 5.2 10e12/L 4.81     Hemoglobin 11.7 - 15.7 g/dL 13.5     Hematocrit 35.0 - 47.0 % 39.3     MCV 78 - 100 fl 82     MCH 26.5 - 33.0 pg 28.1     MCHC 31.5 - 36.5 g/dL 34.4     RDW 10.0 - 15.0 % 13.2     Platelet Count 150 - 450 10e9/L 163     View Full Report                       Lipid Profile   Collected:  5/26/2017 10:51 AM Order: [id number]                Ref Range & Units 4mo ago       Cholesterol <200 mg/dL 171     Triglycerides <150 mg/dL 157 (H)     Comments: Borderline high:  150-199 mg/dl    High:             200-499 mg/dl    Very high:       >499 mg/dl    Non Fasting        HDL Cholesterol >49 mg/dL 46 (L)     LDL Cholesterol Calculated <100 mg/dL 94     Comments: Desirable:       <100 mg/dl     Non HDL Cholesterol <130 mg/dL 125     View Full Report                       TSH with free T4 reflex   Collected:  5/26/2017 10:51 AM Order: [id number]              Ref Range & Units 4mo ago       TSH 0.40 - 4.00 mU/L 0.02 (L)     View Full Report        ZIO PATCH REPORT    ORDERING PHYSICIAN:  Akash Alvarez NP    INDICATIONS:  Palpitations    ENROLLMENT PERIOD:  September 6, 2017 to September 14, 2017 (7-days  19-hours of analysis time available)    MONITOR:  ZIO XT Patch    FINDINGS:  Review of the data show that the patient had underlying  sinus rhythm.  The minimum heart rate was 47, average heart rate was  80, maximum heart rate 140 beats per minute.  There was underlying  sinus rhythm.  There was no significant bradycardia, pauses, or  heart  block. There were isolated supraventricular beats, a rare number (less  than 1%).  There was an occasional couplet.  No SVT.  There were rare  PVC's.  Rare couplet.   No ventricular tachycardia.  There was an  episode of bigeminy, lasting for about 5-minutes 55-seconds.  There  were 5 triggered patient events; one of them had PVC's and all had  underlying sinus rhythm.  There were no diary entries.    ASSESSMENT:  ZIO XT Patch Report revealing the followin.  Sinus rhythm throughout.  2.  No significant bradycardia, pauses or heart block.  3.  Rare PAC'S, no supraventricular tachycardia.  4.  Rare PVC's, no ventricular tachycardia.  There was 5-minutes  55-seconds of a bigeminal pattern.  5.  There were 5 triggered events; only one had a PVC and all had  underlying sinus rhythm.  There were no diary entries.            SIGNATURE PAGE ONLY  Exam Date: Sep 20, 2017 12:55:01 AM  Author: JOHANA LOVETT  This report is final and signed

## 2017-11-17 DIAGNOSIS — E03.9 ACQUIRED HYPOTHYROIDISM: ICD-10-CM

## 2017-11-17 NOTE — TELEPHONE ENCOUNTER
Levothyroxine   Last Office Visit: 9/1/2017  Last Refill Date:8/1/2017  # 90          Refills  0      Thank you!

## 2017-11-20 RX ORDER — LEVOTHYROXINE SODIUM 175 UG/1
TABLET ORAL
Qty: 90 TABLET | Refills: 1 | Status: SHIPPED | OUTPATIENT
Start: 2017-11-20 | End: 2018-06-21

## 2017-11-28 ENCOUNTER — TELEPHONE (OUTPATIENT)
Dept: CARDIOLOGY | Facility: OTHER | Age: 42
End: 2017-11-28

## 2017-11-28 NOTE — TELEPHONE ENCOUNTER
Patient cancelled appointment with Dr. Schneider on 10/6/2017 for consult and has not called back to reschedule.  This message sent to patient's primary.

## 2018-01-19 ENCOUNTER — TELEPHONE (OUTPATIENT)
Dept: FAMILY MEDICINE | Facility: OTHER | Age: 43
End: 2018-01-19

## 2018-01-19 NOTE — TELEPHONE ENCOUNTER
Pa for test strips came in tried getting a hold of pt to let know these ones are not covered and she will need a new machine phone not taking any calls at this time will try again later  Pamela M Lechevalier LPN

## 2018-01-31 DIAGNOSIS — R73.09 ELEVATED GLUCOSE: ICD-10-CM

## 2018-01-31 NOTE — TELEPHONE ENCOUNTER
blood glucose monitoring (ONE TOUCH DELICA) lancets   Last Written Prescription Date:  9/8/17  Last Fill Quantity: 100,   # refills: 3  Last Office Visit: 9/1/17  Future Office visit:

## 2018-02-05 ENCOUNTER — ALLIED HEALTH/NURSE VISIT (OUTPATIENT)
Dept: FAMILY MEDICINE | Facility: OTHER | Age: 43
End: 2018-02-05
Attending: NURSE PRACTITIONER
Payer: COMMERCIAL

## 2018-02-05 DIAGNOSIS — Z23 NEED FOR PROPHYLACTIC VACCINATION AND INOCULATION AGAINST INFLUENZA: Primary | ICD-10-CM

## 2018-02-05 PROCEDURE — 90471 IMMUNIZATION ADMIN: CPT

## 2018-02-05 PROCEDURE — 90686 IIV4 VACC NO PRSV 0.5 ML IM: CPT

## 2018-02-05 NOTE — MR AVS SNAPSHOT
After Visit Summary   2/5/2018    Gita Jones    MRN: [id number]           Patient Information     Date Of Birth          1975        Visit Information        Provider Department      2/5/2018 2:45 PM [facility] NURSE [facility]        Today's Diagnoses     Need for prophylactic vaccination and inoculation against influenza    -  1       Follow-ups after your visit        Your next 10 appointments already scheduled     Feb 05, 2018  2:45 PM CST   (Arrive by 2:30 PM)   Nurse Only with [facility] NURSE   [facility] ([facility] )    [address]  [facility] [address]   [phone]              Who to contact     If you have questions or need follow up information about today's clinic visit or your schedule please contact [facility] directly at [phone].  Normal or non-critical lab and imaging results will be communicated to you by [employer], letter or phone within 4 business days after the clinic has received the results. If you do not hear from us within 7 days, please contact the clinic through [employer] or phone. If you have a critical or abnormal lab result, we will notify you by phone as soon as possible.  Submit refill requests through [employer] or call your pharmacy and they will forward the refill request to us. Please allow 3 business days for your refill to be completed.          Additional Information About Your Visit        MyChart Information     [employer] gives you secure access to your electronic health record. If you see a primary care provider, you can also send messages to your care team and make appointments. If you have questions, please call your primary care clinic.  If you do not have a primary care provider, please call [phone] and they will assist you.        Care EveryWhere ID     This is your Care EveryWhere ID. This could be used by other organizations to access your [facility]  records  [id number]         Blood Pressure from Last 3 Encounters:   09/26/17 109/73   09/08/17 121/77   09/06/17 118/74    Weight from Last 3 Encounters:   09/26/17 153 lb (69.4 kg)   09/08/17 158 lb 14.4 oz (72.1 kg)   09/06/17 155 lb (70.3 kg)              We Performed the Following     FLU VAC, SPLIT VIRUS IM > 3 YO (QUADRIVALENT) [56067]     Vaccine Administration, Initial [57357]        Primary Care Provider Office Phone # Fax #    Maria Del Rosario MorrismargauxRyanSTEPHANIE [phone] [address] [facility] [address]        Equal Access to Services     CHACNE LEONARD : Ila michaelo Soalfredo, waaxda luqadaha, qaybta kaalmada adeegyada, chandni martin. So [facility] [phone].    ATENCIÓN: Si habla español, tiene a severino disposición servicios gratuitos de asistencia lingüística. Llame al [phone].    We comply with applicable federal civil rights laws and [place] laws. We do not discriminate on the basis of race, color, national origin, age, disability, sex, sexual orientation, or gender identity.            Thank you!     Thank you for choosing [facility]  for your care. Our goal is always to provide you with excellent care. Hearing back from our patients is one way we can continue to improve our services. Please take a few minutes to complete the written survey that you may receive in the mail after your visit with us. Thank you!             Your Updated Medication List - Protect others around you: Learn how to safely use, store and throw away your medicines at www.disposemymeds.org.          This list is accurate as of 2/5/18  2:30 PM.  Always use your most recent med list.                   Brand Name Dispense Instructions for use Diagnosis    blood glucose monitoring lancets     100 each    Use to test blood sugar 4 times daily.    Elevated glucose       blood glucose monitoring test strip    ONETOUCH VERIO IQ    100 each    Use to test  blood sugar 4 times daily.    Elevated glucose       levothyroxine 175 MCG tablet    SYNTHROID/LEVOTHROID    90 tablet    TAKE 1 TABLET(175 MCG) BY MOUTH DAILY    Acquired hypothyroidism       multivitamin, therapeutic with minerals Tabs tablet      Take 1 tablet by mouth daily.        SUMAtriptan 25 MG tablet    IMITREX     Take 25 mg by mouth

## 2018-02-05 NOTE — PROGRESS NOTES

## 2018-04-06 ENCOUNTER — TRANSFERRED RECORDS (OUTPATIENT)
Dept: HEALTH INFORMATION MANAGEMENT | Facility: CLINIC | Age: 43
End: 2018-04-06

## 2018-04-06 LAB
CREAT SERPL-MCNC: 0.78 MG/DL (ref 0.4–1)
GLUCOSE SERPL-MCNC: 127 MG/DL (ref 70–100)
POTASSIUM SERPL-SCNC: 3.9 MEQ/L (ref 3.4–5.1)

## 2018-05-24 DIAGNOSIS — R73.09 ELEVATED GLUCOSE: ICD-10-CM

## 2018-06-19 ENCOUNTER — OFFICE VISIT (OUTPATIENT)
Dept: FAMILY MEDICINE | Facility: OTHER | Age: 43
End: 2018-06-19
Attending: NURSE PRACTITIONER
Payer: COMMERCIAL

## 2018-06-19 VITALS
WEIGHT: 164.2 LBS | BODY MASS INDEX: 30.03 KG/M2 | SYSTOLIC BLOOD PRESSURE: 102 MMHG | HEART RATE: 81 BPM | DIASTOLIC BLOOD PRESSURE: 62 MMHG | TEMPERATURE: 97.2 F | OXYGEN SATURATION: 99 %

## 2018-06-19 DIAGNOSIS — Z12.31 ENCOUNTER FOR SCREENING MAMMOGRAM FOR BREAST CANCER: ICD-10-CM

## 2018-06-19 DIAGNOSIS — R19.4 CHANGE IN BOWEL HABITS: ICD-10-CM

## 2018-06-19 DIAGNOSIS — G43.009 MIGRAINE WITHOUT AURA AND WITHOUT STATUS MIGRAINOSUS, NOT INTRACTABLE: ICD-10-CM

## 2018-06-19 DIAGNOSIS — R20.2 NUMBNESS AND TINGLING IN BOTH HANDS: ICD-10-CM

## 2018-06-19 DIAGNOSIS — R20.0 NUMBNESS AND TINGLING IN BOTH HANDS: ICD-10-CM

## 2018-06-19 DIAGNOSIS — Z12.39 SCREENING FOR BREAST CANCER: Primary | ICD-10-CM

## 2018-06-19 DIAGNOSIS — R60.1 GENERALIZED EDEMA: ICD-10-CM

## 2018-06-19 DIAGNOSIS — E03.9 ACQUIRED HYPOTHYROIDISM: Primary | ICD-10-CM

## 2018-06-19 DIAGNOSIS — R73.9 BLOOD GLUCOSE ELEVATED: ICD-10-CM

## 2018-06-19 LAB
CHOLEST SERPL-MCNC: 150 MG/DL
EST. AVERAGE GLUCOSE BLD GHB EST-MCNC: 108 MG/DL
HBA1C MFR BLD: 5.4 % (ref 0–5.6)
HDLC SERPL-MCNC: 42 MG/DL
LDLC SERPL CALC-MCNC: 86 MG/DL
NONHDLC SERPL-MCNC: 108 MG/DL
T4 FREE SERPL-MCNC: 1.45 NG/DL (ref 0.76–1.46)
TRIGL SERPL-MCNC: 110 MG/DL
TSH SERPL DL<=0.005 MIU/L-ACNC: 0.11 MU/L (ref 0.4–4)

## 2018-06-19 PROCEDURE — 36415 COLL VENOUS BLD VENIPUNCTURE: CPT | Mod: ZL | Performed by: NURSE PRACTITIONER

## 2018-06-19 PROCEDURE — 99214 OFFICE O/P EST MOD 30 MIN: CPT | Performed by: NURSE PRACTITIONER

## 2018-06-19 PROCEDURE — 84439 ASSAY OF FREE THYROXINE: CPT | Mod: ZL | Performed by: NURSE PRACTITIONER

## 2018-06-19 PROCEDURE — 83036 HEMOGLOBIN GLYCOSYLATED A1C: CPT | Mod: ZL | Performed by: NURSE PRACTITIONER

## 2018-06-19 PROCEDURE — G0463 HOSPITAL OUTPT CLINIC VISIT: HCPCS | Performed by: NURSE PRACTITIONER

## 2018-06-19 PROCEDURE — 84443 ASSAY THYROID STIM HORMONE: CPT | Mod: ZL | Performed by: NURSE PRACTITIONER

## 2018-06-19 PROCEDURE — 80061 LIPID PANEL: CPT | Mod: ZL | Performed by: NURSE PRACTITIONER

## 2018-06-19 RX ORDER — SUMATRIPTAN 25 MG/1
25 TABLET, FILM COATED ORAL
Qty: 30 TABLET | Refills: 3 | Status: SHIPPED | OUTPATIENT
Start: 2018-06-19 | End: 2019-09-11

## 2018-06-19 ASSESSMENT — ANXIETY QUESTIONNAIRES
GAD7 TOTAL SCORE: 4
5. BEING SO RESTLESS THAT IT IS HARD TO SIT STILL: NOT AT ALL
2. NOT BEING ABLE TO STOP OR CONTROL WORRYING: SEVERAL DAYS
1. FEELING NERVOUS, ANXIOUS, OR ON EDGE: SEVERAL DAYS
IF YOU CHECKED OFF ANY PROBLEMS ON THIS QUESTIONNAIRE, HOW DIFFICULT HAVE THESE PROBLEMS MADE IT FOR YOU TO DO YOUR WORK, TAKE CARE OF THINGS AT HOME, OR GET ALONG WITH OTHER PEOPLE: NOT DIFFICULT AT ALL
7. FEELING AFRAID AS IF SOMETHING AWFUL MIGHT HAPPEN: NOT AT ALL
6. BECOMING EASILY ANNOYED OR IRRITABLE: NOT AT ALL
3. WORRYING TOO MUCH ABOUT DIFFERENT THINGS: SEVERAL DAYS

## 2018-06-19 ASSESSMENT — PATIENT HEALTH QUESTIONNAIRE - PHQ9: 5. POOR APPETITE OR OVEREATING: SEVERAL DAYS

## 2018-06-19 ASSESSMENT — PAIN SCALES - GENERAL: PAINLEVEL: MILD PAIN (3)

## 2018-06-19 NOTE — PROGRESS NOTES
SUBJECTIVE:   Gita Jones is a 42 year old female who presents to clinic today for the following health issues:      Migraine Follow-Up    Headaches symptoms: visual changes    Frequency: 2 monthly     Duration of headaches: up to days    Able to do normal daily activities/work with migraines: No -     Rescue/Relief medication:sumatriptan (Imitrex)              Effectiveness: moderate relief    Preventative medication: None    Neurologic complications: No new stroke-like symptoms, loss of vision or speech, numbness or weakness    In the past 4 weeks, how often have you gone to Urgent Care or the emergency room because of your headaches?  0    Hypothyroidism Follow-up      Since last visit, patient describes the following symptoms: weight gain of 11 lbs, constipation and fatigue      Amount of exercise or physical activity: walking daily    Problems taking medications regularly: No    Medication side effects: none    Diet: regular (no restrictions)      Constipation     Onset: ongoing    Description:   Frequency of bowel movements: 2 times a day.  Stool consistency: hard, small caliber    Progression of Symptoms:  same    Accompanying Signs & Symptoms:  Abdominal pain (cramping?): YES- and pressure, gas  Blood in stool: no   Rectal pain: YES- tailbone  Nausea/vomiting: YES- nausea and feeling full  Weight loss or gain: YES-    History:   History of abdominal surgery: YES- hernia fixed    Precipitating factors:   Recent use of narcotics, anticholinergics, calcium channel blockers, antacids, or iron supplements: no   Chronic Laxative Use: no          Therapies Tried and outcome: change in diet and laxatives that did not work.      She also notes bilateral numbness/tingling of hands    Problem list and histories reviewed & adjusted, as indicated.  Additional history: as documented    Patient Active Problem List   Diagnosis     Hypothyroidism     History of pelvic mass     Constipation     Social anxiety  disorder     Migraine     ACP (advance care planning)     Migraine without aura and without status migrainosus, not intractable     Blood glucose elevated     Past Surgical History:   Procedure Laterality Date     APPENDECTOMY        SECTION  2007      SECTION       HERNIORRHAPHY UMBILICAL  2013    Procedure: HERNIORRHAPHY UMBILICAL;  OPEN SUPRA UMBILICAL HERNIA REPAIR WITH MESH;  Surgeon: Shante Goss DO;  Location: [facility]     lymph node bx neck-benign         Social History   Substance Use Topics     Smoking status: Former Smoker     Packs/day: 2.00     Years: 10.00     Types: Cigarettes     Smokeless tobacco: Never Used      Comment: quit in . no passive exposure.     Alcohol use No     Family History   Problem Relation Age of Onset     Cancer Mother 54     cause of death     Diabetes Father      Lipids Father      hyperlipidemia     Hypertension Father      Cerebrovascular Disease Father      Neurologic Disorder Son      seizures         Current Outpatient Prescriptions   Medication Sig Dispense Refill     blood glucose monitoring (ONE TOUCH DELICA) lancets Use to test blood sugar 4 times daily. 100 each 0     blood glucose monitoring (ONETOUCH VERIO IQ) test strip Use to test blood sugar 4 times daily. 100 each 11     levothyroxine (SYNTHROID/LEVOTHROID) 175 MCG tablet TAKE 1 TABLET(175 MCG) BY MOUTH DAILY 90 tablet 1     multivitamin, therapeutic with minerals (THERA-VIT-M) TABS Take 1 tablet by mouth daily.       SUMAtriptan (IMITREX) 25 MG tablet Take 25 mg by mouth       Allergies   Allergen Reactions     Dye [Contrast Dye] Rash     IV dye, iodine containing contrast media     Nickel Rash     Recent Labs   Lab Test 18   1232  17   1051  16   1840   14   2341   13   2228   A1C   --   5.2   --    --    --    --    --    --    LDL   --    --   94   --    --    --    --    --    HDL   --    --   46*   --    --    --    --    --    TRIG   --     --   157*   --    --    --    --    --    ALT   --    --    --   20   --   24   --   39   CR  0.78   --    --   0.68   --   0.89   < >  0.95   GFRESTIMATED   --    --    --   >90  Non  GFR Calc     --   70   < >  66   GFRESTBLACK   --    --    --   >90   GFR Calc     --   85   < >  80   POTASSIUM  3.9   --    --   3.6   --   3.6   < >  3.9   TSH   --    --   0.02*  13.35*   < >   --    < >  0.35    < > = values in this interval not displayed.      BP Readings from Last 3 Encounters:   06/19/18 102/62   09/26/17 109/73   09/08/17 121/77    Wt Readings from Last 3 Encounters:   06/19/18 164 lb 3.2 oz (74.5 kg)   09/26/17 153 lb (69.4 kg)   09/08/17 158 lb 14.4 oz (72.1 kg)                    Reviewed and updated as needed this visit by clinical staff  Tobacco  Allergies       Reviewed and updated as needed this visit by Provider         ROS:  Constitutional, HEENT, cardiovascular, pulmonary, gi and gu systems are negative, except as otherwise noted.    OBJECTIVE:     /62 (BP Location: Left arm, Patient Position: Sitting, Cuff Size: Adult Regular)  Pulse 81  Temp 97.2  F (36.2  C) (Tympanic)  Wt 164 lb 3.2 oz (74.5 kg)  SpO2 99%  BMI 30.03 kg/m2  Body mass index is 30.03 kg/(m^2).  GENERAL: healthy, alert and no distress  EYES: Eyes grossly normal to inspection, PERRL and conjunctivae and sclerae normal  HENT: ear canals and TM's normal, nose and mouth without ulcers or lesions  NECK: no adenopathy, no asymmetry, masses, or scars and thyroid normal to palpation  RESP: lungs clear to auscultation - no rales, rhonchi or wheezes  CV: regular rate and rhythm, normal S1 S2, no S3 or S4, no murmur, click or rub, no peripheral edema and peripheral pulses strong  ABDOMEN: soft, nontender, no hepatosplenomegaly, no masses and bowel sounds normal  MS: positive phalen and negative tinnel   PSYCH: mentation appears normal, affect normal/bright        ASSESSMENT/PLAN:       1.  Acquired hypothyroidism  chronic  - Lipid Profile  - TSH with free T4 reflex    2. Blood glucose elevated  - Hemoglobin A1c    3. Migraine without aura and without status migrainosus, not intractable  Continue current plan  - SUMAtriptan (IMITREX) 25 MG tablet; Take 1 tablet (25 mg) by mouth at onset of headache for migraine  Dispense: 30 tablet; Refill: 3    4. Encounter for screening mammogram for breast cancer  routine  - MA Screen Bilateral w/Quang; Future    5. Generalized edema  Decrease sodium intake to 2000MG daily    6. Change in bowel habits  - GENERAL SURG ADULT REFERRAL - [facility]    7. Numbness and tingling in both hands  symptomatic  - NEUROLOGY ADULT REFERRAL - Dr Fox for an EMG      FUTURE APPOINTMENTS:       - Follow-up visit 6 months or as needed for acute concerns.     Maria Del Rosario Alvarez NP  [facility] ([facility]

## 2018-06-19 NOTE — MR AVS SNAPSHOT
After Visit Summary   6/19/2018    Gita Jones    MRN: [id number]           Patient Information     Date Of Birth          1975        Visit Information        Provider Department      6/19/2018 10:00 AM Maria Del Rosario Alvarez NP [facility]        Today's Diagnoses     Acquired hypothyroidism    -  1    Blood glucose elevated        Migraine without aura and without status migrainosus, not intractable        Encounter for screening mammogram for breast cancer        Generalized edema        Change in bowel habits        Numbness and tingling in both hands          Care Instructions      ASSESSMENT/PLAN:       1. Acquired hypothyroidism  chronic  - Lipid Profile  - TSH with free T4 reflex    2. Blood glucose elevated  - Hemoglobin A1c    3. Migraine without aura and without status migrainosus, not intractable  Continue current plan  - SUMAtriptan (IMITREX) 25 MG tablet; Take 1 tablet (25 mg) by mouth at onset of headache for migraine  Dispense: 30 tablet; Refill: 3    4. Encounter for screening mammogram for breast cancer  routine  - MA Screen Bilateral w/Quang; Future    5. Generalized edema  Decrease sodium intake to 2000MG daily    6. Change in bowel habits  - GENERAL SURG ADULT REFERRAL - [facility]    7. Numbness and tingling in both hands  symptomatic  - NEUROLOGY ADULT REFERRAL - Dr Fox for an EMG      FUTURE APPOINTMENTS:       - Follow-up visit 6 months or as needed for acute concerns.     Maria Del Rosario Alvarez NP  [facility]              Follow-ups after your visit        Additional Services     GENERAL SURG ADULT REFERRAL       Your provider has referred you to: Doe Woodson    Please be aware that coverage of these services is subject to the terms and limitations of your health insurance plan.  Call member services at your health plan with any benefit or coverage questions.      Please bring the following with you to your appointment:    (1) Any  X-Rays, CTs or MRIs which have been performed.  Contact the facility where they were done to arrange for  prior to your scheduled appointment.   (2) List of current medications   (3) This referral request   (4) Any documents/labs given to you for this referral            NEUROLOGY ADULT REFERRAL       Your provider has referred you for the following:   Dr Fox - EMG    Please be aware that coverage of these services is subject to the terms and limitations of your health insurance plan.  Call member services at your health plan with any benefit or coverage questions.      Please bring the following with you to your appointment:    (1) Any X-Rays, CTs or MRIs which have been performed.  Contact the facility where they were done to arrange for  prior to your scheduled appointment.    (2) List of current medications  (3) This referral request   (4) Any documents/labs given to you for this referral                  Follow-up notes from your care team     Return in about 6 months (around 12/19/2018).      Your next 10 appointments already scheduled     Jun 26, 2018 11:00 AM CDT   (Arrive by 10:45 AM)   New Visit with Edilberto Gamez MD   [facility] ([facility] )    [address] [address]   [phone]            Jul 25, 2018  1:30 PM CDT   (Arrive by 1:15 PM)   MA SCREENING DIGITAL BILATERAL with MTMA1   [facility] ([facility] )    [address] [address]   [phone]           Do not use any powder, lotion or deodorant under your arms or on your breast. If you do, we will ask you to remove it before your exam.  Wear comfortable, two-piece clothing.  If you have any allergies, tell your care team.  Bring any previous mammograms from other facilities or have them mailed to the breast center.              Who to contact     If you have questions or need follow up information about  today's clinic visit or your schedule please contact [facility] directly at [phone].  Normal or non-critical lab and imaging results will be communicated to you by MyChart, letter or phone within 4 business days after the clinic has received the results. If you do not hear from us within 7 days, please contact the clinic through Se[facility] or phone. If you have a critical or abnormal lab result, we will notify you by phone as soon as possible.  Submit refill requests through [facility] or call your pharmacy and they will forward the refill request to us. Please allow 3 business days for your refill to be completed.          Additional Information About Your Visit        SezWhohar[facility]gen Information     [facility] gives you secure access to your electronic health record. If you see a primary care provider, you can also send messages to your care team and make appointments. If you have questions, please call your primary care clinic.  If you do not have a primary care provider, please call [phone] and they will assist you.        Care EveryWhere ID     This is your Care EveryWhere ID. This could be used by other organizations to access your [facility] medical records  [id number]        Your Vitals Were     Pulse Temperature Pulse Oximetry BMI (Body Mass Index)          81 97.2  F (36.2  C) (Tympanic) 99% 30.03 kg/m2         Blood Pressure from Last 3 Encounters:   06/19/18 102/62   09/26/17 109/73   09/08/17 121/77    Weight from Last 3 Encounters:   06/19/18 164 lb 3.2 oz (74.5 kg)   09/26/17 153 lb (69.4 kg)   09/08/17 158 lb 14.4 oz (72.1 kg)              We Performed the Following     Estimated Average Glucose     GENERAL SURG ADULT REFERRAL     Hemoglobin A1c     Lipid Profile     NEUROLOGY ADULT REFERRAL     T4 free     TSH with free T4 reflex          Today's Medication Changes          These changes are accurate as of 6/19/18 11:59 PM.  If you have any questions, ask your nurse or doctor.                These medicines have changed or have updated prescriptions.        Dose/Directions    SUMAtriptan 25 MG tablet   Commonly known as:  IMITREX   This may have changed:    - when to take this  - reasons to take this   Used for:  Migraine without aura and without status migrainosus, not intractable   Changed by:  Maria Del Rosario Alvarez NP        Dose:  25 mg   Take 1 tablet (25 mg) by mouth at onset of headache for migraine   Quantity:  30 tablet   Refills:  3            Where to get your medicines      These medications were sent to [facility] - [address]  AT [facility] [address]  [address] [facility] [address]     Phone:  [phone]     SUMAtriptan 25 MG tablet                Primary Care Provider Office Phone # Fax #    Maria Del Rosario Alvarez [phone] [address] [facility] [address]        Equal Access to Services     CHANCE LEONARD AH: Hadii shi ku hadasho Soomaali, waaxda luqadaha, qaybta kaalmada adeegyada, waxay idiin hayaan elisa kharashaye mchugh . So [facility] [phone].    ATENCIÓN: Si jaspreet hooker, tiene a severino disposición servicios gratuitos de asistencia lingüística. Llame al [phone].    We comply with applicable federal civil rights laws and [place] laws. We do not discriminate on the basis of race, color, national origin, age, disability, sex, sexual orientation, or gender identity.            Thank you!     Thank you for choosing [facility]  for your care. Our goal is always to provide you with excellent care. Hearing back from our patients is one way we can continue to improve our services. Please take a few minutes to complete the written survey that you may receive in the mail after your visit with us. Thank you!             Your Updated Medication List - Protect others around you: Learn how to safely use, store and throw away your medicines at www.disposemymeds.org.           This list is accurate as of 6/19/18 11:59 PM.  Always use your most recent med list.                   Brand Name Dispense Instructions for use Diagnosis    blood glucose monitoring lancets     100 each    Use to test blood sugar 4 times daily.    Elevated glucose       blood glucose monitoring test strip    ONETOUCH VERIO IQ    100 each    Use to test blood sugar 4 times daily.    Elevated glucose       multivitamin, therapeutic with minerals Tabs tablet      Take 1 tablet by mouth daily.        SUMAtriptan 25 MG tablet    IMITREX    30 tablet    Take 1 tablet (25 mg) by mouth at onset of headache for migraine    Migraine without aura and without status migrainosus, not intractable

## 2018-06-19 NOTE — PATIENT INSTRUCTIONS
ASSESSMENT/PLAN:       1. Acquired hypothyroidism  chronic  - Lipid Profile  - TSH with free T4 reflex    2. Blood glucose elevated  - Hemoglobin A1c    3. Migraine without aura and without status migrainosus, not intractable  Continue current plan  - SUMAtriptan (IMITREX) 25 MG tablet; Take 1 tablet (25 mg) by mouth at onset of headache for migraine  Dispense: 30 tablet; Refill: 3    4. Encounter for screening mammogram for breast cancer  routine  - MA Screen Bilateral w/Quang; Future    5. Generalized edema  Decrease sodium intake to 2000MG daily    6. Change in bowel habits  - GENERAL SURG ADULT REFERRAL - [facility]    7. Numbness and tingling in both hands  symptomatic  - NEUROLOGY ADULT REFERRAL - Dr Fxo for an EMG      FUTURE APPOINTMENTS:       - Follow-up visit 6 months or as needed for acute concerns.     Maria Del Rosario Alvarez, NP  [facility]

## 2018-06-19 NOTE — NURSING NOTE
"Chief Complaint   Patient presents with     Thyroid Problem     Constipation     Patient reports having a hard time going to the bathroom and feels a lot of pressure     Headache     Patient reports a migraine coming on       Initial /62 (BP Location: Left arm, Patient Position: Sitting, Cuff Size: Adult Regular)  Pulse 81  Temp 97.2  F (36.2  C) (Tympanic)  Wt 164 lb 3.2 oz (74.5 kg)  SpO2 99%  BMI 30.03 kg/m2 Estimated body mass index is 30.03 kg/(m^2) as calculated from the following:    Height as of 9/26/17: 5' 2\" (1.575 m).    Weight as of this encounter: 164 lb 3.2 oz (74.5 kg).  Medication Reconciliation: complete    Margot Padron LPN    "

## 2018-06-20 ASSESSMENT — PATIENT HEALTH QUESTIONNAIRE - PHQ9: SUM OF ALL RESPONSES TO PHQ QUESTIONS 1-9: 0

## 2018-06-20 ASSESSMENT — ANXIETY QUESTIONNAIRES: GAD7 TOTAL SCORE: 4

## 2018-06-21 ENCOUNTER — MYC REFILL (OUTPATIENT)
Dept: FAMILY MEDICINE | Facility: OTHER | Age: 43
End: 2018-06-21

## 2018-06-21 ENCOUNTER — TELEPHONE (OUTPATIENT)
Dept: FAMILY MEDICINE | Facility: OTHER | Age: 43
End: 2018-06-21

## 2018-06-21 DIAGNOSIS — E03.9 ACQUIRED HYPOTHYROIDISM: ICD-10-CM

## 2018-06-21 RX ORDER — LEVOTHYROXINE SODIUM 175 UG/1
TABLET ORAL
Qty: 90 TABLET | Refills: 1 | Status: SHIPPED | OUTPATIENT
Start: 2018-06-21 | End: 2018-12-17

## 2018-06-21 NOTE — TELEPHONE ENCOUNTER
Message from MyChart:  Original authorizing provider: MYLES Tabor would like a refill of the following medications:  levothyroxine (SYNTHROID/LEVOTHROID) 175 MCG tablet [MYLES Tabor]    Preferred pharmacy: [facility] [address] [place] [address] [address] [facility] [address]    Comment:

## 2018-06-21 NOTE — TELEPHONE ENCOUNTER
A1c is normal - no diabetes, thyroid normal,   Lipids: normal   The 10-year ASCVD risk score (Brittany WHITE Jr, et al., 2013) is: 0.4%    Values used to calculate the score:      Age: 42 years      Sex: Female      Is Non- : No      Diabetic: No      Tobacco smoker: No      Systolic Blood Pressure: 102 mmHg      Is BP treated: No      HDL Cholesterol: 42 mg/dL      Total Cholesterol: 150 mg/dL

## 2018-06-26 ENCOUNTER — OFFICE VISIT (OUTPATIENT)
Dept: SURGERY | Facility: OTHER | Age: 43
End: 2018-06-26
Attending: NURSE PRACTITIONER
Payer: COMMERCIAL

## 2018-06-26 ENCOUNTER — TELEPHONE (OUTPATIENT)
Dept: SURGERY | Facility: OTHER | Age: 43
End: 2018-06-26

## 2018-06-26 VITALS
WEIGHT: 162 LBS | BODY MASS INDEX: 29.81 KG/M2 | DIASTOLIC BLOOD PRESSURE: 66 MMHG | SYSTOLIC BLOOD PRESSURE: 104 MMHG | HEART RATE: 80 BPM | TEMPERATURE: 98.6 F | HEIGHT: 62 IN | OXYGEN SATURATION: 98 %

## 2018-06-26 DIAGNOSIS — R19.4 CHANGE IN BOWEL HABITS: ICD-10-CM

## 2018-06-26 PROCEDURE — 99203 OFFICE O/P NEW LOW 30 MIN: CPT | Performed by: SURGERY

## 2018-06-26 PROCEDURE — G0463 HOSPITAL OUTPT CLINIC VISIT: HCPCS | Performed by: SURGERY

## 2018-06-26 RX ORDER — CYCLOBENZAPRINE HCL 5 MG
1 TABLET ORAL 3 TIMES DAILY PRN
Refills: 0 | COMMUNITY
Start: 2017-11-02 | End: 2023-04-27

## 2018-06-26 RX ORDER — BISACODYL 5 MG/1
TABLET, DELAYED RELEASE ORAL
Qty: 2 TABLET | Refills: 0 | Status: SHIPPED | OUTPATIENT
Start: 2018-06-26 | End: 2019-03-06

## 2018-06-26 RX ORDER — POLYETHYLENE GLYCOL 3350 17 G/17G
POWDER, FOR SOLUTION ORAL
Qty: 1 BOTTLE | Refills: 0 | Status: SHIPPED | OUTPATIENT
Start: 2018-06-26 | End: 2018-11-05

## 2018-06-26 ASSESSMENT — PAIN SCALES - GENERAL: PAINLEVEL: NO PAIN (0)

## 2018-06-26 NOTE — PATIENT INSTRUCTIONS
Your procedure will be at the [facility] in [place]  [address] [facility] [address]  [place] () [phone]  Fax Number [phone]    Thank you for allowing Dr. Gamez and our surgical team to participate in your care.  If you have a scheduling or an appointment question please contact Bree our Health Unit Coordinator at her direct line [phone].   For nursing questions call Martha at [phone]      You are scheduled for a: colonoscopy  Your procedure date is: 7/11/18    You need a friend or family member available to drive you home AND stay with you for 24 hours after you leave the hospital. You will not be allowed to drive yourself. IF you need to take a taxi or the bus you MUST have a responsible person to ride with you. YOUR PROCEDURE WILL BE CANCELLED IF YOU DO NOT HAVE A RESPONSIBLE ADULT TO DRIVE YOU HOME.       You CANNOT have anything to eat or drink after midnight the night before your surgery, ncluding water and coffee. Your stomach needs to be completely empty. Do NOT chew gum, suck on hard candy, or smoke. You can brush your teeth the morning of surgery.       You need to call our Surgery Education Nurses 1-2 weeks prior to your surgery date at  [phone] or toll free [phone]. Please have you medication and allergy lists ready.      Stop your aspirin or other NSAIDs(Ibuprofen, Motrin, Aleve, Celebrex, Naproxen, etc...) 7 days before your surgery.      Hospital admitting will call you the day before your surgery with your arrival time. If you are scheduled on a Monday admitting will call you the Friday before.      Please call your primary care physician if you should become ill within 24 hours of scheduled surgery. (ex.vomiting, diarrhea, fever)  Surgery center will contact you the day before your procedure between the hours of noon and 5 pm with the time you need to register in admitting at the surgery center. Call Sveta with any questions  [phone]  Hold all medications day of surgery, once you arrive home you may resume them all like normal.

## 2018-06-26 NOTE — TELEPHONE ENCOUNTER
Writer called to talk with patient about not having insurance and needing to reschedule her date.  Writer left message for patient to call back to discuss  Sveta Hernandez

## 2018-06-26 NOTE — PROGRESS NOTES
Surgery Consult Clinic Note      RE: Gita Jones  : 1975  HIRAL: 2018      Chief Complaint:  Abdominal pain, bloating   Change in bowel habits.     History of Present Illness:  Gita Jones is a very pleasant 42 year old year old female who I am seeing at the request of Akash Davis for evaluation of change in bowel habits and consideration for colonoscopy.  She admits that over the last several weeks she has been having issues with a feeling of fullness in her rectum and the inability to completely evacuate. She denies blood. She does admit when things do come out they are very hard. She has tried to increase her fiber with metamucil, fiber one bars, and fruit smoothies. She admits to bloating and gas and cramping lower abdominal pain. She admits to stress incontinence of urine. No issues with control of her bowels.   She denies family history of colon or rectal cancer, blood in stool, weight loss,   Surgical hx:  x2, appendectomy, umbilical hernia .     Medical history:  Past Medical History:   Diagnosis Date     Abdominal pain, unspecified site 2002     Abnormal feces 2004     Abnormal maternal glucose tolerance, antepartum 2001     Acute sinusitis, unspecified 2005     Chest pain, unspecified 2003     Diarrhea 2004     Dizziness and giddiness 2008     Headache(784.0) 2002     History of pelvic mass 2014     hypothyroidism 2012     Irregular menstrual cycle 2003     Migraine, unspecified, without mention of intractable migraine without mention of status migrainosus 2004     Other disorder of menstruation and other abnormal bleeding from female genital tract 2006     Other malaise and fatigue 2002     Other specified hemorrhage in early pregnancy, antepartum 2000     Social anxiety disorder 2015     Supervision of other normal pregnancy 2000     Syncope and collapse 2003     Unspecified symptom  associated with female genital organs 2002       Surgical history:  Past Surgical History:   Procedure Laterality Date     APPENDECTOMY        SECTION        SECTION       HERNIORRHAPHY UMBILICAL  2013    Procedure: HERNIORRHAPHY UMBILICAL;  OPEN SUPRA UMBILICAL HERNIA REPAIR WITH MESH;  Surgeon: Shante Goss DO;  Location: [facility]     lymph node bx neck-benign         Family history:  Family History   Problem Relation Age of Onset     Cancer Mother 54     cause of death     Diabetes Father      Lipids Father      hyperlipidemia     Hypertension Father      Cerebrovascular Disease Father      Neurologic Disorder Son      seizures       Medications:  Current Outpatient Prescriptions   Medication Sig Dispense Refill     bisacodyl (DULCOLAX) 5 MG EC tablet Take 2 tablets at noon day before procedure 2 tablet 0     blood glucose monitoring (ONE TOUCH DELICA) lancets Use to test blood sugar 4 times daily. 100 each 0     blood glucose monitoring (ONETOUCH VERIO IQ) test strip Use to test blood sugar 4 times daily. 100 each 11     cyclobenzaprine (FLEXERIL) 5 MG tablet Take 1 tablet by mouth 3 times daily as needed  0     levothyroxine (SYNTHROID/LEVOTHROID) 175 MCG tablet TAKE 1 TABLET(175 MCG) BY MOUTH DAILY 90 tablet 1     magnesium citrate solution At 4 am day of procedure drink entire bottle 296 mL 0     multivitamin, therapeutic with minerals (THERA-VIT-M) TABS Take 1 tablet by mouth daily.       polyethylene glycol (MIRALAX) powder Mix entire bottle with gatorade  At 6 pm day before procedure 1 Bottle 0     SUMAtriptan (IMITREX) 25 MG tablet Take 1 tablet (25 mg) by mouth at onset of headache for migraine 30 tablet 3     Allergies:  The patientis allergic to dye [contrast dye] and nickel.  .  Social history:  Social History   Substance Use Topics     Smoking status: Former Smoker     Packs/day: 2.00     Years: 10.00     Types: Cigarettes     Quit date: 2000     Smokeless  "tobacco: Never Used      Comment: quit in 2001. no passive exposure.     Alcohol use No     Marital status: .      Review of Systems:  10 point review of systems was obtained and negative other than what previously mentioned in HPI    Physical Examination:  /66  Pulse 80  Temp 98.6  F (37  C)  Ht 5' 2\" (1.575 m)  Wt 162 lb (73.5 kg)  SpO2 98%  BMI 29.63 kg/m2  General: AAOx4, NAD, WN/WD, ambulating without assistance  HEENT:NCAT, EOMI, PERRL Sclerae anicteric; Trachea mideline,   Chest:  No acute distress, CTAB  Cardiac:  regular rate and rhythm, no murmur   Abdomen: soft, mild lower quadrant tenderness,   Extremities: Cursory exam unremarkable.  Skin: Warm, dry,   Neuro: CN 2-12 grossly intact, no focal deficit,   Psych: happy, calm, asks appropriate questions      Assessment/Plan:  Change in bowel habits, It is difficult to call this constipation as she does have regular bowel movements. She has attempted fiber supplementation and feels it just makes her bloating worse. The feeling of pressure in her rectum and inability to completely evacuate could be due to a weak pelvic floor, would like to proceed with colonoscopy to ensure there is no mass that could be limiting her bowel movements. If negative discussed possibility of CT scan to rule out any structural issue internally. If these are both negative will likely need GI referral for IBS.     Satisfactory candidate for colonoscopy.  The indications, risks, benefits and technical aspects of whole colon colonoscopy were outlined with risks including, but not limited to, perforation, bleeding and inability to visualize entire colon.  Management of each was reviewed.  The need of mechanical preparation of the colon was reviewed along with the use of monitored anesthetic care.  The patient's questions were asked and answered.      "

## 2018-06-26 NOTE — MR AVS SNAPSHOT
After Visit Summary   6/26/2018    Gita Jones    MRN: [id number]           Patient Information     Date Of Birth          1975        Visit Information        Provider Department      6/26/2018 11:00 AM Edilberto Gamez MD [facility]        Today's Diagnoses     Change in bowel habits          Care Instructions     Your procedure will be at the [facility] in [place]  [address] [place] [address]  Connie () [phone]  Fax Number [phone]    Thank you for allowing Dr. Gamez and our surgical team to participate in your care.  If you have a scheduling or an appointment question please contact [facility] Unit Coordinator at her direct line [phone].   For nursing questions call Martha at [phone]      You are scheduled for a: colonoscopy  Your procedure date is: 7/11/18    You need a friend or family member available to drive you home AND stay with you for 24 hours after you leave the hospital. You will not be allowed to drive yourself. IF you need to take a taxi or the bus you MUST have a responsible person to ride with you. YOUR PROCEDURE WILL BE CANCELLED IF YOU DO NOT HAVE A RESPONSIBLE ADULT TO DRIVE YOU HOME.       You CANNOT have anything to eat or drink after midnight the night before your surgery, ncluding water and coffee. Your stomach needs to be completely empty. Do NOT chew gum, suck on hard candy, or smoke. You can brush your teeth the morning of surgery.       You need to call our Surgery Education Nurses 1-2 weeks prior to your surgery date at  [phone] or toll free [phone]. Please have you medication and allergy lists ready.      Stop your aspirin or other NSAIDs(Ibuprofen, Motrin, Aleve, Celebrex, Naproxen, etc...) 7 days before your surgery.      Hospital admitting will call you the day before your surgery with your arrival time. If you are scheduled on a Monday admitting will call you the Friday  before.      Please call your primary care physician if you should become ill within 24 hours of scheduled surgery. (ex.vomiting, diarrhea, fever)  Surgery center will contact you the day before your procedure between the hours of noon and 5 pm with the time you need to register in admitting at the surgery center. Call Sveta with any questions [phone]  Hold all medications day of surgery, once you arrive home you may resume them all like normal.             Follow-ups after your visit        Your next 10 appointments already scheduled     Jul 25, 2018  1:30 PM CDT   (Arrive by 1:15 PM)   MA SCREENING DIGITAL BILATERAL with MTMA1   [facility] ([facility] )    [address] [id number] [phone]           Do not use any powder, lotion or deodorant under your arms or on your breast. If you do, we will ask you to remove it before your exam.  Wear comfortable, two-piece clothing.  If you have any allergies, tell your care team.  Bring any previous mammograms from other facilities or have them mailed to the breast center.              Who to contact     If you have questions or need follow up information about today's clinic visit or your schedule please contact [facility] directly at [phone].  Normal or non-critical lab and imaging results will be communicated to you by [employer], letter or phone within 4 business days after the clinic has received the results. If you do not hear from us within 7 days, please contact the clinic through [employer] or phone. If you have a critical or abnormal lab result, we will notify you by phone as soon as possible.  Submit refill requests through [employer] or call your pharmacy and they will forward the refill request to us. Please allow 3 business days for your refill to be completed.          Additional Information About Your Visit        [employer] Information     [employer] gives you secure  "access to your electronic health record. If you see a primary care provider, you can also send messages to your care team and make appointments. If you have questions, please call your primary care clinic.  If you do not have a primary care provider, please call [phone] and they will assist you.        Care EveryWhere ID     This is your Care EveryWhere ID. This could be used by other organizations to access your [facility] medical records  [id number]        Your Vitals Were     Pulse Temperature Height Pulse Oximetry BMI (Body Mass Index)       80 98.6  F (37  C) 5' 2\" (1.575 m) 98% 29.63 kg/m2        Blood Pressure from Last 3 Encounters:   06/26/18 104/66   06/19/18 102/62   09/26/17 109/73    Weight from Last 3 Encounters:   06/26/18 162 lb (73.5 kg)   06/19/18 164 lb 3.2 oz (74.5 kg)   09/26/17 153 lb (69.4 kg)              Today, you had the following     No orders found for display       Primary Care Provider Office Phone # Fax #    Maria Del Rosario Alvarez [phone] [address] [place] [id number]        Equal Access to Services     CHANCE LEONARD AH: Hadii aad ku hadasho Soomaali, waaxda luqadaha, qaybta kaalmada adeegyada, waxay luisin haydannien elisa martin. So [facility] [phone].    ATENCIÓN: Si habla español, tiene a severino disposición servicios gratuitos de asistencia lingüística. Llame al [phone].    We comply with applicable federal civil rights laws and [place] laws. We do not discriminate on the basis of race, color, national origin, age, disability, sex, sexual orientation, or gender identity.            Thank you!     Thank you for choosing [facility]  for your care. Our goal is always to provide you with excellent care. Hearing back from our patients is one way we can continue to improve our services. Please take a few minutes to complete the written survey that you may receive in the mail after your visit with us. Thank " you!             Your Updated Medication List - Protect others around you: Learn how to safely use, store and throw away your medicines at www.disposemymeds.org.          This list is accurate as of 6/26/18 11:36 AM.  Always use your most recent med list.                   Brand Name Dispense Instructions for use Diagnosis    blood glucose monitoring lancets     100 each    Use to test blood sugar 4 times daily.    Elevated glucose       blood glucose monitoring test strip    ONETOUCH VERIO IQ    100 each    Use to test blood sugar 4 times daily.    Elevated glucose       cyclobenzaprine 5 MG tablet    FLEXERIL     Take 1 tablet by mouth 3 times daily as needed        levothyroxine 175 MCG tablet    SYNTHROID/LEVOTHROID    90 tablet    TAKE 1 TABLET(175 MCG) BY MOUTH DAILY    Acquired hypothyroidism       multivitamin, therapeutic with minerals Tabs tablet      Take 1 tablet by mouth daily.        SUMAtriptan 25 MG tablet    IMITREX    30 tablet    Take 1 tablet (25 mg) by mouth at onset of headache for migraine    Migraine without aura and without status migrainosus, not intractable

## 2018-06-26 NOTE — NURSING NOTE
"Chief Complaint   Patient presents with     Consult For     change in bowel habits. Referred by Akash Davis.        Initial /66  Pulse 80  Temp 98.6  F (37  C)  Ht 5' 2\" (1.575 m)  Wt 162 lb (73.5 kg)  SpO2 98%  BMI 29.63 kg/m2 Estimated body mass index is 29.63 kg/(m^2) as calculated from the following:    Height as of this encounter: 5' 2\" (1.575 m).    Weight as of this encounter: 162 lb (73.5 kg).  Medication Reconciliation: complete    JOSE SCHROEDER LPN    "

## 2018-06-27 NOTE — TELEPHONE ENCOUNTER
Writer spoke with patient and she stated she does have insurance and she would bring in her new card so we have it on file and she can go ahead with her surgery on the day we picked at [facility].  Writer emailed [facility] and let them know that we are awaiting the new card but to keep her scheduled and if she does not bring in we will then cancel or reschedule.     Sveta Hernandez

## 2018-07-09 ENCOUNTER — TELEPHONE (OUTPATIENT)
Dept: SURGERY | Facility: OTHER | Age: 43
End: 2018-07-09

## 2018-07-09 NOTE — TELEPHONE ENCOUNTER
Please call patient back in regards to making sure she is still having her procedure done on 7-11-18 with Dr Gamez at [facility]. She DOES have insurance in the computer now. Patient can be reached at [phone]. Thanks.

## 2018-07-10 NOTE — TELEPHONE ENCOUNTER
Nurse attempted to contact patient again regarding her upcoming procedure with Dr Gamez.  Again, did not get voicemail or an answer.  Nurse will try again at a later time.

## 2018-07-10 NOTE — TELEPHONE ENCOUNTER
Nurse has attempted to contact the patient several times.  No voicemail set up, and message states the patient is unavailable.  Nurse will continue to try to contact the patient regarding her scheduled surgery date.

## 2018-07-11 ENCOUNTER — OFFICE VISIT (OUTPATIENT)
Dept: ANESTHESIOLOGY | Facility: HOSPITAL | Age: 43
End: 2018-07-11
Attending: SURGERY
Payer: COMMERCIAL

## 2018-07-11 DIAGNOSIS — R14.1 FLATULENCE, ERUCTATION, AND GAS PAIN: Primary | ICD-10-CM

## 2018-07-11 DIAGNOSIS — R14.3 FLATULENCE, ERUCTATION, AND GAS PAIN: Primary | ICD-10-CM

## 2018-07-11 DIAGNOSIS — R14.2 FLATULENCE, ERUCTATION, AND GAS PAIN: Primary | ICD-10-CM

## 2018-07-11 PROCEDURE — 45378 DIAGNOSTIC COLONOSCOPY: CPT | Performed by: SURGERY

## 2018-07-11 PROCEDURE — 00812 ANES LWR INTST SCR COLSC: CPT | Mod: QZ | Performed by: NURSE ANESTHETIST, CERTIFIED REGISTERED

## 2018-07-12 ENCOUNTER — HOSPITAL ENCOUNTER (OUTPATIENT)
Dept: CT IMAGING | Facility: HOSPITAL | Age: 43
Discharge: HOME | End: 2018-07-12
Attending: SURGERY | Admitting: SURGERY
Payer: COMMERCIAL

## 2018-07-12 DIAGNOSIS — R14.2 FLATULENCE, ERUCTATION, AND GAS PAIN: ICD-10-CM

## 2018-07-12 DIAGNOSIS — R14.1 FLATULENCE, ERUCTATION, AND GAS PAIN: ICD-10-CM

## 2018-07-12 DIAGNOSIS — R14.3 FLATULENCE, ERUCTATION, AND GAS PAIN: ICD-10-CM

## 2018-07-12 PROCEDURE — 74176 CT ABD & PELVIS W/O CONTRAST: CPT | Mod: TC

## 2018-11-05 DIAGNOSIS — R19.4 CHANGE IN BOWEL HABITS: ICD-10-CM

## 2018-11-05 RX ORDER — POLYETHYLENE GLYCOL 3350 17 G/17G
POWDER, FOR SOLUTION ORAL
Qty: 255 G | Refills: 0 | Status: SHIPPED | OUTPATIENT
Start: 2018-11-05 | End: 2019-03-06

## 2018-11-05 NOTE — TELEPHONE ENCOUNTER
polyethylene glycol (MIRALAX/GLYCOLAX) powder    Last Written Prescription Date:  6/26/18  Last Fill Quantity: 1 bottle ,   # refills: 0  Last Office Visit: 10/1/18  Future Office visit:

## 2018-12-13 ENCOUNTER — APPOINTMENT (OUTPATIENT)
Dept: LAB | Facility: OTHER | Age: 43
End: 2018-12-13
Attending: PHYSICIAN ASSISTANT
Payer: COMMERCIAL

## 2018-12-13 ENCOUNTER — TELEPHONE (OUTPATIENT)
Dept: FAMILY MEDICINE | Facility: OTHER | Age: 43
End: 2018-12-13
Payer: COMMERCIAL

## 2018-12-13 DIAGNOSIS — E03.9 ACQUIRED HYPOTHYROIDISM: Primary | ICD-10-CM

## 2018-12-13 LAB
T4 FREE SERPL-MCNC: 1.44 NG/DL (ref 0.76–1.46)
TSH SERPL DL<=0.005 MIU/L-ACNC: 0.02 MU/L (ref 0.4–4)

## 2018-12-13 PROCEDURE — 84439 ASSAY OF FREE THYROXINE: CPT | Performed by: PHYSICIAN ASSISTANT

## 2018-12-13 PROCEDURE — 36415 COLL VENOUS BLD VENIPUNCTURE: CPT | Performed by: PHYSICIAN ASSISTANT

## 2018-12-13 PROCEDURE — 84443 ASSAY THYROID STIM HORMONE: CPT | Performed by: PHYSICIAN ASSISTANT

## 2018-12-14 ENCOUNTER — TELEPHONE (OUTPATIENT)
Dept: FAMILY MEDICINE | Facility: OTHER | Age: 43
End: 2018-12-14

## 2018-12-14 DIAGNOSIS — E03.9 ACQUIRED HYPOTHYROIDISM: Primary | ICD-10-CM

## 2018-12-15 ENCOUNTER — MYC MEDICAL ADVICE (OUTPATIENT)
Dept: FAMILY MEDICINE | Facility: OTHER | Age: 43
End: 2018-12-15

## 2018-12-15 DIAGNOSIS — E03.9 ACQUIRED HYPOTHYROIDISM: ICD-10-CM

## 2018-12-17 RX ORDER — LEVOTHYROXINE SODIUM 150 UG/1
TABLET ORAL
Qty: 90 TABLET | Refills: 0 | Status: SHIPPED | OUTPATIENT
Start: 2018-12-17 | End: 2019-03-06

## 2018-12-20 RX ORDER — LEVOTHYROXINE SODIUM 150 UG/1
150 TABLET ORAL DAILY
Qty: 90 TABLET | Refills: 3 | Status: SHIPPED | OUTPATIENT
Start: 2018-12-20 | End: 2019-08-05

## 2018-12-20 NOTE — TELEPHONE ENCOUNTER
I sent in 150 mcg to [facility] in [place]. Sx of both hypo and hyper can be simul ar fatigue tired. She is over replaced.

## 2018-12-22 ENCOUNTER — TRANSFERRED RECORDS (OUTPATIENT)
Dept: HEALTH INFORMATION MANAGEMENT | Facility: CLINIC | Age: 43
End: 2018-12-22

## 2019-01-02 NOTE — PROGRESS NOTES
SUBJECTIVE:   Gita Jones is a 43 year old female who presents to clinic today for the following health issues:        Hypothyroidism Follow-up      Since last visit, patient describes the following symptoms: weight gain of 5-6 pounds lbs, constipation, loose stools and hair loss      Amount of exercise or physical activity: 4-5 days/week for an average of 30-45 minutes    Problems taking medications regularly: No    Medication side effects: none    Diet: FODMAP and vegetarian          GERD/Heartburn      Duration: worsening.     Description (location/character/radiation): worsening sx.     Intensity:  moderate    Accompanying signs and symptoms:  food getting stuck: YES  nausea/vomiting/blood: YES  abdominal pain: no   black/tarry or bloody stools: no :    History (similar episodes/previous evaluation): None    Precipitating or alleviating factors:  worse with caffeinated drinks and peppermint.  current NSAID/Aspirin use: no     Therapies tried and outcome: antacids    Vaginal Symptoms      Duration: recent antibiotics     Description  itching and burning    Intensity:  mild    Accompanying signs and symptoms (fever/dysuria/abdominal or back pain): None    History  Sexually active: not at present  Possibility of pregnancy: No  Recent antibiotic use: YES    Precipitating or alleviating factors: None    Therapies tried and outcome: Monistat   Outcome: not completely helpful.         Problem list and histories reviewed & adjusted, as indicated.  Additional history: as documented    Patient Active Problem List   Diagnosis     Hypothyroidism     History of pelvic mass     Constipation     Social anxiety disorder     Migraine     ACP (advance care planning)     Migraine without aura and without status migrainosus, not intractable     Blood glucose elevated     Past Surgical History:   Procedure Laterality Date     APPENDECTOMY        SECTION  2007      SECTION  2005     HERNIORRHAPHY UMBILICAL   2013    Procedure: HERNIORRHAPHY UMBILICAL;  OPEN SUPRA UMBILICAL HERNIA REPAIR WITH MESH;  Surgeon: Shante Goss DO;  Location: [facility]     lymph node bx neck-benign         Social History     Tobacco Use     Smoking status: Former Smoker     Packs/day: 2.00     Years: 10.00     Pack years: 20.00     Types: Cigarettes     Last attempt to quit: 2000     Years since quittin.0     Smokeless tobacco: Never Used     Tobacco comment: quit in . no passive exposure.   Substance Use Topics     Alcohol use: No     Family History   Problem Relation Age of Onset     Cancer Mother 54        cause of death     Diabetes Father      Lipids Father         hyperlipidemia     Hypertension Father      Cerebrovascular Disease Father      Neurologic Disorder Son         seizures         Current Outpatient Medications   Medication Sig Dispense Refill     bisacodyl (DULCOLAX) 5 MG EC tablet Take 2 tablets at noon day before procedure 2 tablet 0     blood glucose monitoring (ONE TOUCH DELICA) lancets Use to test blood sugar 4 times daily. 100 each 0     blood glucose monitoring (ONETOUCH VERIO IQ) test strip Use to test blood sugar 4 times daily. 100 each 11     cyclobenzaprine (FLEXERIL) 5 MG tablet Take 1 tablet by mouth 3 times daily as needed  0     levothyroxine (SYNTHROID/LEVOTHROID) 150 MCG tablet Take 1 tablet (150 mcg) by mouth daily 90 tablet 3     levothyroxine (SYNTHROID/LEVOTHROID) 150 MCG tablet TAKE 1 TABLET(175 MCG) BY MOUTH DAILY 90 tablet 0     magnesium citrate solution At 4 am day of procedure drink entire bottle 296 mL 0     multivitamin, therapeutic with minerals (THERA-VIT-M) TABS Take 1 tablet by mouth daily.       polyethylene glycol (MIRALAX/GLYCOLAX) powder MIX ENTIE BOTTLE WITH GATORADE AT 6PM THE DAY BEOFRE PROCEDURE 255 g 0     SUMAtriptan (IMITREX) 25 MG tablet Take 1 tablet (25 mg) by mouth at onset of headache for migraine 30 tablet 3     Allergies   Allergen Reactions     Dye [Contrast  "Dye] Rash     IV dye, iodine containing contrast media     Nickel Rash     Recent Labs   Lab Test 12/13/18  1617 06/19/18  1113 04/06/18 08/22/17  1232 05/26/17  1051 08/18/16  1840  12/01/14  2341  06/13/13  2228   A1C  --  5.4  --  5.2  --   --   --   --   --   --    LDL  --  86  --   --  94  --   --   --   --   --    HDL  --  42*  --   --  46*  --   --   --   --   --    TRIG  --  110  --   --  157*  --   --   --   --   --    ALT  --   --   --   --   --  20  --  24  --  39   CR  --   --  0.78  --   --  0.68  --  0.89   < > 0.95   GFRESTIMATED  --   --   --   --   --  >90  Non  GFR Calc    --  70   < > 66   GFRESTBLACK  --   --   --   --   --  >90  African American GFR Calc    --  85   < > 80   POTASSIUM  --   --  3.9  --   --  3.6  --  3.6   < > 3.9   TSH 0.02* 0.11*  --   --  0.02* 13.35*   < >  --    < > 0.35    < > = values in this interval not displayed.      BP Readings from Last 3 Encounters:   01/04/19 126/70   06/26/18 104/66   06/19/18 102/62    Wt Readings from Last 3 Encounters:   01/04/19 76.2 kg (168 lb)   06/26/18 73.5 kg (162 lb)   06/19/18 74.5 kg (164 lb 3.2 oz)                    Reviewed and updated as needed this visit by clinical staff       Reviewed and updated as needed this visit by Provider         ROS:  Constitutional, neuro, ENT, endocrine, pulmonary, cardiac, gastrointestinal, genitourinary, musculoskeletal, integument and psychiatric systems are negative, except as otherwise noted.    OBJECTIVE:                                                    /70 (BP Location: Left arm, Patient Position: Sitting, Cuff Size: Adult Regular)   Pulse 92   Temp 98.8  F (37.1  C) (Tympanic)   Resp 18   Ht 1.575 m (5' 2\")   Wt 76.2 kg (168 lb)   SpO2 98%   BMI 30.73 kg/m    Body mass index is 30.73 kg/m .  GENERAL APPEARANCE: healthy, alert and no distress  EYES: Eyes grossly normal to inspection, PERRL and conjunctivae and sclerae normal  HENT: ear canals and TM's normal and " nose and mouth without ulcers or lesions  NECK: no adenopathy, no asymmetry, masses, or scars and thyroid normal to palpation  RESP: lungs clear to auscultation - no rales, rhonchi or wheezes  CV: regular rates and rhythm, normal S1 S2, no S3 or S4 and no murmur, click or rub  LYMPHATICS: no cervical adenopathy  MS: extremities normal- no gross deformities noted  SKIN: no suspicious lesions or rashes  NEURO: Normal strength and tone, mentation intact and speech normal  PSYCH: mentation appears normal and affect normal/bright  ABD:  epigastric region is very tender.  bs active. Scattered tympany.   Diagnostic test results:  Diagnostic Test Results:  No results found for this or any previous visit (from the past 24 hour(s)).     ASSESSMENT/PLAN:                                                    1. Acquired hypothyroidism  She is cut her dose back and feeling better will get her level done in the first week of Feb. Was getting swollen in her face and cushingoid like. Feels better now on 150 mcg.   - TSH with free T4 reflex; Future    2. Gastroesophageal reflux disease with esophagitis  Very tender in epigastrium some food sticking. Start on Protonix and FODMAP diet and doing ok. Add in Protonix and see us back in 2 to3 months and if not better get a endoscopy.   - pantoprazole (PROTONIX) 40 MG EC tablet; Take 1 tablet (40 mg) by mouth daily  Dispense: 90 tablet; Refill: 0    3. Yeast infection  Recurrent issue for her due to abx use. Ears are clear now. Given Fluconazole.   - fluconazole (DIFLUCAN) 150 MG tablet; Take 1 tablet (150 mg) by mouth daily for 3 days  Dispense: 3 tablet; Refill: 0      Follow up with Provider - 3 months.      MYLES Tabor  [facility]

## 2019-01-04 ENCOUNTER — OFFICE VISIT (OUTPATIENT)
Dept: FAMILY MEDICINE | Facility: OTHER | Age: 44
End: 2019-01-04
Attending: NURSE PRACTITIONER
Payer: COMMERCIAL

## 2019-01-04 VITALS
BODY MASS INDEX: 30.91 KG/M2 | WEIGHT: 168 LBS | HEIGHT: 62 IN | SYSTOLIC BLOOD PRESSURE: 126 MMHG | OXYGEN SATURATION: 98 % | RESPIRATION RATE: 18 BRPM | DIASTOLIC BLOOD PRESSURE: 70 MMHG | TEMPERATURE: 98.8 F | HEART RATE: 92 BPM

## 2019-01-04 DIAGNOSIS — E03.9 ACQUIRED HYPOTHYROIDISM: Primary | ICD-10-CM

## 2019-01-04 DIAGNOSIS — K21.00 GASTROESOPHAGEAL REFLUX DISEASE WITH ESOPHAGITIS: ICD-10-CM

## 2019-01-04 DIAGNOSIS — B37.9 YEAST INFECTION: ICD-10-CM

## 2019-01-04 PROCEDURE — 99214 OFFICE O/P EST MOD 30 MIN: CPT | Performed by: PHYSICIAN ASSISTANT

## 2019-01-04 PROCEDURE — G0463 HOSPITAL OUTPT CLINIC VISIT: HCPCS

## 2019-01-04 RX ORDER — PANTOPRAZOLE SODIUM 40 MG/1
40 TABLET, DELAYED RELEASE ORAL DAILY
Qty: 90 TABLET | Refills: 0 | Status: SHIPPED | OUTPATIENT
Start: 2019-01-04 | End: 2019-10-03

## 2019-01-04 RX ORDER — FLUCONAZOLE 150 MG/1
150 TABLET ORAL DAILY
Qty: 3 TABLET | Refills: 0 | Status: SHIPPED | OUTPATIENT
Start: 2019-01-04 | End: 2019-03-06

## 2019-01-04 ASSESSMENT — MIFFLIN-ST. JEOR: SCORE: 1370.29

## 2019-01-04 ASSESSMENT — ANXIETY QUESTIONNAIRES
6. BECOMING EASILY ANNOYED OR IRRITABLE: NOT AT ALL
3. WORRYING TOO MUCH ABOUT DIFFERENT THINGS: SEVERAL DAYS
GAD7 TOTAL SCORE: 6
7. FEELING AFRAID AS IF SOMETHING AWFUL MIGHT HAPPEN: SEVERAL DAYS
2. NOT BEING ABLE TO STOP OR CONTROL WORRYING: SEVERAL DAYS
4. TROUBLE RELAXING: SEVERAL DAYS
IF YOU CHECKED OFF ANY PROBLEMS ON THIS QUESTIONNAIRE, HOW DIFFICULT HAVE THESE PROBLEMS MADE IT FOR YOU TO DO YOUR WORK, TAKE CARE OF THINGS AT HOME, OR GET ALONG WITH OTHER PEOPLE: NOT DIFFICULT AT ALL
5. BEING SO RESTLESS THAT IT IS HARD TO SIT STILL: SEVERAL DAYS
1. FEELING NERVOUS, ANXIOUS, OR ON EDGE: SEVERAL DAYS

## 2019-01-04 ASSESSMENT — PATIENT HEALTH QUESTIONNAIRE - PHQ9: SUM OF ALL RESPONSES TO PHQ QUESTIONS 1-9: 5

## 2019-01-04 ASSESSMENT — PAIN SCALES - GENERAL: PAINLEVEL: NO PAIN (0)

## 2019-01-04 NOTE — NURSING NOTE
"Chief Complaint   Patient presents with     Thyroid Problem       Initial /70 (BP Location: Left arm, Patient Position: Sitting, Cuff Size: Adult Regular)   Pulse 92   Temp 98.8  F (37.1  C) (Tympanic)   Resp 18   Ht 1.575 m (5' 2\")   Wt 76.2 kg (168 lb)   SpO2 98%   BMI 30.73 kg/m   Estimated body mass index is 30.73 kg/m  as calculated from the following:    Height as of this encounter: 1.575 m (5' 2\").    Weight as of this encounter: 76.2 kg (168 lb).  Medication Reconciliation: complete    Veronika Hines LPN  "

## 2019-01-04 NOTE — PATIENT INSTRUCTIONS
Thank you for choosing [facility].   I have office hours 8:00 am to 4:30 pm on Monday's, Wednesday's, Thursday's and Friday's. My nurse and I are out of the office every Tuesday.    Following your visit, when your labs and diagnostic testing have returned, I will review then and you will be contacted by my nurse.  If you are on My Chart, you can also view results there.    For refills, notify your pharmacy regarding what you need and the pharmacy will generate a refill request. Do not call my nurse as she is unable to process refill request. Please plan ahead and allow 3-5 days for refill requests.    You will generally receive a reminder call the day prior to your appointment.  If you cannot attend your appointment, please cancel your appointment with as much notice as possible.  If there is a pattern of failure to present for your appointments, I cannot provide consistent, meaningful, ongoing care for you. It is very important to me that you come in for your care, so we can best assist you with your health care needs.    IMPORTANT:  Please note that it is my standard of practice to NOT participate in prescribing ongoing requested Narcotic Analgesic therapy, and/or participate in the prescribing of other controlled substances.  My nurse and I am happy to assist you with the process of referral for alternative pain management as needed, and other treatment modalities including but not limited to:  Physical Therapy, Physical Medicine and Rehab, Counseling, Chiropractic Care, Orthopedic Care, and non-narcotic medication management.     In the event that you need to be seen for emergent concerns and I am out of office,  please see one of my colleagues for acute concerns.  You may also present to  or ER.  I appreciate the opportunity to serve you and look forward to supporting your healthcare needs in the future. Please contact me with any questions or concerns that you may  have.    Sincerely,      Monique Lopez RN, PA-C

## 2019-01-05 ASSESSMENT — ANXIETY QUESTIONNAIRES: GAD7 TOTAL SCORE: 6

## 2019-01-20 ENCOUNTER — TRANSFERRED RECORDS (OUTPATIENT)
Dept: HEALTH INFORMATION MANAGEMENT | Facility: CLINIC | Age: 44
End: 2019-01-20

## 2019-01-20 LAB
ALT SERPL-CCNC: 38 IU/L (ref 6–31)
AST SERPL-CCNC: 27 IU/L (ref 10–40)
CREAT SERPL-MCNC: 0.82 MG/DL (ref 0.4–1)
GFR SERPL CREATININE-BSD FRML MDRD: >60 ML/MIN/1.73M2
GLUCOSE SERPL-MCNC: 100 MG/DL (ref 70–99)
POTASSIUM SERPL-SCNC: 3.4 MEQ/L (ref 3.4–5.1)

## 2019-02-22 ENCOUNTER — TRANSFERRED RECORDS (OUTPATIENT)
Dept: HEALTH INFORMATION MANAGEMENT | Facility: CLINIC | Age: 44
End: 2019-02-22

## 2019-02-22 LAB — EJECTION FRACTION: 65

## 2019-03-05 ENCOUNTER — TELEPHONE (OUTPATIENT)
Dept: FAMILY MEDICINE | Facility: OTHER | Age: 44
End: 2019-03-05

## 2019-03-05 NOTE — TELEPHONE ENCOUNTER
3:17 PM    Reason for Call: OVERBOOK    Patient is having the following symptoms: extremities bloating  for 2-3  weeks.    The patient is requesting an appointment for overbook with Mary Romero or Dr. Driscoll.    Was an appointment offered for this call? Yes  If yes : Appointment type              Date next available 4/1/19 declined to wait that long.    Preferred method for responding to this message: Telephone Call  What is your phone number ? [phone]    If we cannot reach you directly, may we leave a detailed response at the number you provided? Yes    Can this message wait until your PCP/provider returns, if unavailable today? YES, as soon as possible.    Rachel Davidson

## 2019-03-06 ENCOUNTER — HOSPITAL ENCOUNTER (EMERGENCY)
Facility: HOSPITAL | Age: 44
Discharge: HOME | End: 2019-03-06
Attending: PHYSICIAN ASSISTANT | Admitting: PHYSICIAN ASSISTANT
Payer: COMMERCIAL

## 2019-03-06 ENCOUNTER — APPOINTMENT (OUTPATIENT)
Dept: CT IMAGING | Facility: HOSPITAL | Age: 44
End: 2019-03-06
Attending: PHYSICIAN ASSISTANT
Payer: COMMERCIAL

## 2019-03-06 VITALS
BODY MASS INDEX: 28.35 KG/M2 | DIASTOLIC BLOOD PRESSURE: 85 MMHG | TEMPERATURE: 98 F | RESPIRATION RATE: 16 BRPM | SYSTOLIC BLOOD PRESSURE: 130 MMHG | OXYGEN SATURATION: 99 % | WEIGHT: 155 LBS | HEART RATE: 80 BPM

## 2019-03-06 DIAGNOSIS — M54.12 CERVICAL RADICULOPATHY: ICD-10-CM

## 2019-03-06 DIAGNOSIS — R14.0 ABDOMINAL BLOATING: ICD-10-CM

## 2019-03-06 LAB
ALBUMIN SERPL-MCNC: 4 G/DL (ref 3.4–5)
ALBUMIN UR-MCNC: NEGATIVE MG/DL
ALP SERPL-CCNC: 118 U/L (ref 40–150)
ALT SERPL W P-5'-P-CCNC: 31 U/L (ref 0–50)
ANION GAP SERPL CALCULATED.3IONS-SCNC: 7 MMOL/L (ref 3–14)
APPEARANCE UR: CLEAR
AST SERPL W P-5'-P-CCNC: 21 U/L (ref 0–45)
BASOPHILS # BLD AUTO: 0 10E9/L (ref 0–0.2)
BASOPHILS NFR BLD AUTO: 0.2 %
BILIRUB SERPL-MCNC: 0.3 MG/DL (ref 0.2–1.3)
BILIRUB UR QL STRIP: NEGATIVE
BUN SERPL-MCNC: 9 MG/DL (ref 7–30)
CALCIUM SERPL-MCNC: 8.4 MG/DL (ref 8.5–10.1)
CHLORIDE SERPL-SCNC: 110 MMOL/L (ref 94–109)
CO2 SERPL-SCNC: 25 MMOL/L (ref 20–32)
COLOR UR AUTO: ABNORMAL
CREAT SERPL-MCNC: 0.8 MG/DL (ref 0.52–1.04)
CRP SERPL-MCNC: 4.7 MG/L (ref 0–8)
DIFFERENTIAL METHOD BLD: ABNORMAL
EOSINOPHIL # BLD AUTO: 0.1 10E9/L (ref 0–0.7)
EOSINOPHIL NFR BLD AUTO: 2.2 %
ERYTHROCYTE [DISTWIDTH] IN BLOOD BY AUTOMATED COUNT: 13.5 % (ref 10–15)
GFR SERPL CREATININE-BSD FRML MDRD: 89 ML/MIN/{1.73_M2}
GLUCOSE SERPL-MCNC: 85 MG/DL (ref 70–99)
GLUCOSE UR STRIP-MCNC: NEGATIVE MG/DL
HCG UR QL: NEGATIVE
HCT VFR BLD AUTO: 37.6 % (ref 35–47)
HGB BLD-MCNC: 12.5 G/DL (ref 11.7–15.7)
HGB UR QL STRIP: NEGATIVE
IMM GRANULOCYTES # BLD: 0 10E9/L (ref 0–0.4)
IMM GRANULOCYTES NFR BLD: 0.2 %
KETONES UR STRIP-MCNC: NEGATIVE MG/DL
LEUKOCYTE ESTERASE UR QL STRIP: NEGATIVE
LIPASE SERPL-CCNC: 179 U/L (ref 73–393)
LYMPHOCYTES # BLD AUTO: 1.3 10E9/L (ref 0.8–5.3)
LYMPHOCYTES NFR BLD AUTO: 26.7 %
MCH RBC QN AUTO: 26.4 PG (ref 26.5–33)
MCHC RBC AUTO-ENTMCNC: 33.2 G/DL (ref 31.5–36.5)
MCV RBC AUTO: 79 FL (ref 78–100)
MONOCYTES # BLD AUTO: 0.4 10E9/L (ref 0–1.3)
MONOCYTES NFR BLD AUTO: 8.3 %
NEUTROPHILS # BLD AUTO: 3.1 10E9/L (ref 1.6–8.3)
NEUTROPHILS NFR BLD AUTO: 62.4 %
NITRATE UR QL: NEGATIVE
NRBC # BLD AUTO: 0 10*3/UL
NRBC BLD AUTO-RTO: 0 /100
PH UR STRIP: 7 PH (ref 4.7–8)
PLATELET # BLD AUTO: 190 10E9/L (ref 150–450)
POTASSIUM SERPL-SCNC: 3.7 MMOL/L (ref 3.4–5.3)
PROT SERPL-MCNC: 7.5 G/DL (ref 6.8–8.8)
RBC # BLD AUTO: 4.74 10E12/L (ref 3.8–5.2)
SODIUM SERPL-SCNC: 142 MMOL/L (ref 133–144)
SOURCE: ABNORMAL
SP GR UR STRIP: 1 (ref 1–1.03)
UROBILINOGEN UR STRIP-MCNC: NORMAL MG/DL (ref 0–2)
WBC # BLD AUTO: 4.9 10E9/L (ref 4–11)

## 2019-03-06 PROCEDURE — 83690 ASSAY OF LIPASE: CPT | Performed by: PHYSICIAN ASSISTANT

## 2019-03-06 PROCEDURE — 81025 URINE PREGNANCY TEST: CPT | Performed by: PHYSICIAN ASSISTANT

## 2019-03-06 PROCEDURE — 36415 COLL VENOUS BLD VENIPUNCTURE: CPT | Performed by: PHYSICIAN ASSISTANT

## 2019-03-06 PROCEDURE — 80053 COMPREHEN METABOLIC PANEL: CPT | Performed by: PHYSICIAN ASSISTANT

## 2019-03-06 PROCEDURE — 86140 C-REACTIVE PROTEIN: CPT | Performed by: PHYSICIAN ASSISTANT

## 2019-03-06 PROCEDURE — 74176 CT ABD & PELVIS W/O CONTRAST: CPT | Mod: TC

## 2019-03-06 PROCEDURE — 99284 EMERGENCY DEPT VISIT MOD MDM: CPT | Mod: Z6 | Performed by: PHYSICIAN ASSISTANT

## 2019-03-06 PROCEDURE — 85025 COMPLETE CBC W/AUTO DIFF WBC: CPT | Performed by: PHYSICIAN ASSISTANT

## 2019-03-06 PROCEDURE — 99284 EMERGENCY DEPT VISIT MOD MDM: CPT | Mod: 25

## 2019-03-06 PROCEDURE — 81003 URINALYSIS AUTO W/O SCOPE: CPT | Performed by: PHYSICIAN ASSISTANT

## 2019-03-06 RX ORDER — PREDNISONE 50 MG/1
50 TABLET ORAL DAILY
Qty: 5 TABLET | Refills: 0 | Status: SHIPPED | OUTPATIENT
Start: 2019-03-06 | End: 2019-03-07

## 2019-03-06 RX ORDER — METOPROLOL TARTRATE 25 MG/1
25 TABLET, FILM COATED ORAL 2 TIMES DAILY
COMMUNITY
Start: 2019-01-23 | End: 2019-03-07

## 2019-03-06 ASSESSMENT — ENCOUNTER SYMPTOMS
FATIGUE: 1
CHEST TIGHTNESS: 0
VOMITING: 0
ABDOMINAL DISTENTION: 1
DIARRHEA: 1
ABDOMINAL PAIN: 1
COUGH: 0
NAUSEA: 1
FEVER: 0
SHORTNESS OF BREATH: 0
CHILLS: 0

## 2019-03-06 NOTE — ED PROVIDER NOTES
"  History     Chief Complaint   Patient presents with     Abdominal Pain     \"for weeks with bloating\"      The history is provided by the patient.     Gita Jones is a 43 year old female who presented to the emergency department ambulatory for evaluation of intermittent symptoms of abdominal bloating, pain, and extremity swelling.  The patient reports that for approximate the last 2 months she has been experiencing intermittent hand, wrist, and foot swelling.  Denies any fevers or chills.  Over the last 3 days she is been expensing worsening bloating and intermittent diarrhea.  She has generalized abdominal discomfort.  She has no lower urinary tract symptoms.  Denies any cough.  Denies any shortness of breath.  Has been worked up recently for left upper chest pain with a negative stress echo.  She does not have an appendix.  She has been having some left-sided neck pain with radiation to her left shoulder, left upper chest, and left hand.  No falls.  No average drug abuse or cancer.    Allergies:  Allergies   Allergen Reactions     Dye [Contrast Dye] Rash     IV dye, iodine containing contrast media     Nickel Rash       Problem List:    Patient Active Problem List    Diagnosis Date Noted     Blood glucose elevated 09/08/2017     Priority: Medium     ACP (advance care planning) 05/20/2016     Priority: Medium     Advance Care Planning 5/20/2016: ACP Review of Chart / Resources Provided:  Reviewed chart for advance care plan.  Gita Jones has been provided information and resources to begin or update their advance care plan.  Added by Gita Yeung             Social anxiety disorder 09/17/2015     Priority: Medium     Constipation 12/12/2014     Priority: Medium     History of pelvic mass 12/04/2014     Priority: Medium     Hypothyroidism 07/12/2013     Priority: Medium     Migraine 09/23/2009     Priority: Medium     Overview:   IMO Update 10/11       Migraine without aura and without status " migrainosus, not intractable 2009     Priority: Medium     Overview:   IMO Update 10/11          Past Medical History:    Past Medical History:   Diagnosis Date     Abdominal pain, unspecified site 2002     Abnormal feces 2004     Abnormal maternal glucose tolerance, antepartum 2001     Acute sinusitis, unspecified 2005     Chest pain, unspecified 2003     Diarrhea 2004     Dizziness and giddiness 2008     Headache(784.0) 2002     History of pelvic mass 2014     hypothyroidism 2012     Irregular menstrual cycle 2003     Migraine, unspecified, without mention of intractable migraine without mention of status migrainosus 2004     Other disorder of menstruation and other abnormal bleeding from female genital tract 2006     Other malaise and fatigue 2002     Other specified hemorrhage in early pregnancy, antepartum 2000     Social anxiety disorder 2015     Supervision of other normal pregnancy 2000     Syncope and collapse 2003     Unspecified symptom associated with female genital organs 2002       Past Surgical History:    Past Surgical History:   Procedure Laterality Date     APPENDECTOMY        SECTION  2007      SECTION       HERNIORRHAPHY UMBILICAL  2013    Procedure: HERNIORRHAPHY UMBILICAL;  OPEN SUPRA UMBILICAL HERNIA REPAIR WITH MESH;  Surgeon: Shante Goss DO;  Location: [facility]     lymph node bx neck-benign         Family History:    Family History   Problem Relation Age of Onset     Cancer Mother 54        cause of death     Diabetes Father      Lipids Father         hyperlipidemia     Hypertension Father      Cerebrovascular Disease Father      Neurologic Disorder Son         seizures       Social History:  Marital Status:   [2]  Social History     Tobacco Use     Smoking status: Former Smoker     Packs/day: 2.00     Years: 10.00     Pack years: 20.00     Types: Cigarettes      Last attempt to quit: 2000     Years since quittin.1     Smokeless tobacco: Never Used     Tobacco comment: quit in . no passive exposure.   Substance Use Topics     Alcohol use: No     Drug use: No        Medications:      aspirin (ASA) 81 MG tablet   blood glucose monitoring (ONE TOUCH DELICA) lancets   blood glucose monitoring (ONETOUCH VERIO IQ) test strip   levothyroxine (SYNTHROID/LEVOTHROID) 150 MCG tablet   metoprolol tartrate (LOPRESSOR) 25 MG tablet   multivitamin, therapeutic with minerals (THERA-VIT-M) TABS   pantoprazole (PROTONIX) 40 MG EC tablet   SUMAtriptan (IMITREX) 25 MG tablet   cyclobenzaprine (FLEXERIL) 5 MG tablet         Review of Systems   Constitutional: Positive for fatigue. Negative for chills and fever.   HENT: Negative.    Respiratory: Negative for cough, chest tightness and shortness of breath.    Gastrointestinal: Positive for abdominal distention, abdominal pain, diarrhea and nausea. Negative for vomiting.   Genitourinary: Negative.    Musculoskeletal:        Please see HPI   Skin: Negative.        Physical Exam   BP: 141/88  Pulse: 80  Heart Rate: 81  Temp: 97.5  F (36.4  C)  Resp: 16  Weight: 70.3 kg (155 lb)  SpO2: 100 %      Physical Exam   Constitutional: She is oriented to person, place, and time. She appears well-developed and well-nourished. No distress.   Cardiovascular: Normal rate and regular rhythm.   Pulmonary/Chest: Effort normal and breath sounds normal.   Abdominal: Soft. She exhibits distension.   There is some mild distention of the abdomen without overt guarding or peritonitis on palpation.  Does have some mild tenderness upon palpation of the periumbilical area.   Neurological: She is alert and oriented to person, place, and time.   Skin: Skin is warm and dry. Capillary refill takes less than 2 seconds.   Psychiatric: She has a normal mood and affect.   Nursing note and vitals reviewed.      ED Course        Procedures               Critical Care  time:  none               Results for orders placed or performed during the hospital encounter of 03/06/19 (from the past 24 hour(s))   UA reflex to Microscopic   Result Value Ref Range    Color Urine Straw     Appearance Urine Clear     Glucose Urine Negative NEG^Negative mg/dL    Bilirubin Urine Negative NEG^Negative    Ketones Urine Negative NEG^Negative mg/dL    Specific Gravity Urine 1.002 (L) 1.003 - 1.035    Blood Urine Negative NEG^Negative    pH Urine 7.0 4.7 - 8.0 pH    Protein Albumin Urine Negative NEG^Negative mg/dL    Urobilinogen mg/dL Normal 0.0 - 2.0 mg/dL    Nitrite Urine Negative NEG^Negative    Leukocyte Esterase Urine Negative NEG^Negative    Source Midstream Urine    HCG qualitative urine (UPT)   Result Value Ref Range    HCG Qual Urine Negative NEG^Negative   CBC with platelets differential   Result Value Ref Range    WBC 4.9 4.0 - 11.0 10e9/L    RBC Count 4.74 3.8 - 5.2 10e12/L    Hemoglobin 12.5 11.7 - 15.7 g/dL    Hematocrit 37.6 35.0 - 47.0 %    MCV 79 78 - 100 fl    MCH 26.4 (L) 26.5 - 33.0 pg    MCHC 33.2 31.5 - 36.5 g/dL    RDW 13.5 10.0 - 15.0 %    Platelet Count 190 150 - 450 10e9/L    Diff Method Automated Method     % Neutrophils 62.4 %    % Lymphocytes 26.7 %    % Monocytes 8.3 %    % Eosinophils 2.2 %    % Basophils 0.2 %    % Immature Granulocytes 0.2 %    Nucleated RBCs 0 0 /100    Absolute Neutrophil 3.1 1.6 - 8.3 10e9/L    Absolute Lymphocytes 1.3 0.8 - 5.3 10e9/L    Absolute Monocytes 0.4 0.0 - 1.3 10e9/L    Absolute Eosinophils 0.1 0.0 - 0.7 10e9/L    Absolute Basophils 0.0 0.0 - 0.2 10e9/L    Abs Immature Granulocytes 0.0 0 - 0.4 10e9/L    Absolute Nucleated RBC 0.0    Comprehensive metabolic panel   Result Value Ref Range    Sodium 142 133 - 144 mmol/L    Potassium 3.7 3.4 - 5.3 mmol/L    Chloride 110 (H) 94 - 109 mmol/L    Carbon Dioxide 25 20 - 32 mmol/L    Anion Gap 7 3 - 14 mmol/L    Glucose 85 70 - 99 mg/dL    Urea Nitrogen 9 7 - 30 mg/dL    Creatinine 0.80 0.52 -  1.04 mg/dL    GFR Estimate 89 >60 mL/min/[1.73_m2]    GFR Estimate If Black >90 >60 mL/min/[1.73_m2]    Calcium 8.4 (L) 8.5 - 10.1 mg/dL    Bilirubin Total 0.3 0.2 - 1.3 mg/dL    Albumin 4.0 3.4 - 5.0 g/dL    Protein Total 7.5 6.8 - 8.8 g/dL    Alkaline Phosphatase 118 40 - 150 U/L    ALT 31 0 - 50 U/L    AST 21 0 - 45 U/L   Lipase   Result Value Ref Range    Lipase 179 73 - 393 U/L   CRP inflammation   Result Value Ref Range    CRP Inflammation 4.7 0.0 - 8.0 mg/L   CT Abdomen Pelvis w/o Contrast    Narrative    PROCEDURE: CT ABDOMEN PELVIS W/O CONTRAST 3/6/2019 12:43 PM    HISTORY: generalized pain and bloating.  Dye allergy    COMPARISONS: 7/12/2018.    Meds/Dose Given: None.    TECHNIQUE: Axial noncontrast enhanced images with coronal and sagittal  reformatted images.    FINDINGS: Lung bases are relatively clear.    No focal abnormality is seen in the liver, spleen, adrenal glands,  pancreas or in either kidney. Spleen appears mildly enlarged measuring  14.4 cm anterior posterior dimension.    No bowel abnormality is seen. There is no inflammatory change or focal  fluid collection. There is a large amount of stool in the colon.    No pelvic mass or fluid collection is seen. There is no aneurysm.  There is no bone lesion.    There is a broad-based disc bulge at the L5-S1 level with some mass  effect on both S1 nerve roots. Mild disc bulges are seen at L4-5 and  L3-4 as well.         Impression    IMPRESSION:   1. No mass, inflammatory change or focal fluid collection.  2. Mild splenomegaly with mildly prominent vessels in the splenic  hilum.    TORITO RIZO MD       Medications - No data to display    Assessments & Plan (with Medical Decision Making)   Workup as above.  The left neck and shoulder pain is most consistent with cervical radicular apathy.  Will treat with prednisone.  Workup, history of present illness, exam, and symptoms are not consistent with acute mesenteric ischemia, acute bowel obstruction,  acute appendicitis, acute pancreatitis, aortic dissection, or other intra-abdominal catastrophe.  I believe she can be safely discharged home with close clinic follow-up tomorrow.  Return here for any worsening symptoms, new symptoms, or other concerns whatsoever.    This document was prepared using a combination of typing and voice generated software.  While every attempt was made for accuracy, spelling and grammatical errors may exist.    I have reviewed the nursing notes.    I have reviewed the findings, diagnosis, plan and need for follow up with the patient.          Medication List      There are no discharge medications for this visit.         Final diagnoses:   Cervical radiculopathy   Abdominal bloating       3/6/2019   [facility]     Cristian Espinoza PA-C  03/06/19 [id number]

## 2019-03-06 NOTE — ED AVS SNAPSHOT
[facility]  [address] [address]  Phone:  [phone]                                    Gita Jones   MRN: [id number]    Department:  [facility]   Date of Visit:  3/6/2019           After Visit Summary Signature Page    I have received my discharge instructions, and my questions have been answered. I have discussed any challenges I see with this plan with the nurse or doctor.    ..........................................................................................................................................  Patient/Patient Representative Signature      ..........................................................................................................................................  Patient Representative Print Name and Relationship to Patient    ..................................................               ................................................  Date                                   Time    ..........................................................................................................................................  Reviewed by Signature/Title    ...................................................              ..............................................  Date                                               Time          22EPIC Rev 08/18

## 2019-03-06 NOTE — ED TRIAGE NOTES
"In ED for \"having abdominal pain and bloating for weeks.  Last bowel movement this morning.  Had diarrhea yesterday.  Is a little nauseated today.\"   "

## 2019-03-06 NOTE — ED TRIAGE NOTES
The patient reports her abdominal pain was worse this morning than it has been, reporting she ate bread sticks and had tea. States loose stools for one week.

## 2019-03-07 ENCOUNTER — OFFICE VISIT (OUTPATIENT)
Dept: FAMILY MEDICINE | Facility: OTHER | Age: 44
End: 2019-03-07
Attending: FAMILY MEDICINE
Payer: COMMERCIAL

## 2019-03-07 VITALS
TEMPERATURE: 97.4 F | HEART RATE: 78 BPM | RESPIRATION RATE: 14 BRPM | BODY MASS INDEX: 28.52 KG/M2 | DIASTOLIC BLOOD PRESSURE: 72 MMHG | WEIGHT: 155 LBS | SYSTOLIC BLOOD PRESSURE: 116 MMHG | HEIGHT: 62 IN | OXYGEN SATURATION: 99 %

## 2019-03-07 DIAGNOSIS — S16.1XXS STRAIN OF NECK MUSCLE, SEQUELA: ICD-10-CM

## 2019-03-07 DIAGNOSIS — E03.9 ACQUIRED HYPOTHYROIDISM: ICD-10-CM

## 2019-03-07 DIAGNOSIS — I10 ESSENTIAL HYPERTENSION: Primary | ICD-10-CM

## 2019-03-07 LAB — TSH SERPL DL<=0.005 MIU/L-ACNC: 1.71 MU/L (ref 0.4–4)

## 2019-03-07 PROCEDURE — 99215 OFFICE O/P EST HI 40 MIN: CPT | Performed by: FAMILY MEDICINE

## 2019-03-07 PROCEDURE — 36415 COLL VENOUS BLD VENIPUNCTURE: CPT | Mod: ZL | Performed by: PHYSICIAN ASSISTANT

## 2019-03-07 PROCEDURE — 84443 ASSAY THYROID STIM HORMONE: CPT | Mod: ZL | Performed by: PHYSICIAN ASSISTANT

## 2019-03-07 PROCEDURE — G0463 HOSPITAL OUTPT CLINIC VISIT: HCPCS

## 2019-03-07 RX ORDER — HYDROCHLOROTHIAZIDE 12.5 MG/1
12.5 TABLET ORAL DAILY
Qty: 30 TABLET | Refills: 2 | Status: SHIPPED | OUTPATIENT
Start: 2019-03-07 | End: 2019-07-18

## 2019-03-07 ASSESSMENT — PAIN SCALES - GENERAL: PAINLEVEL: NO PAIN (0)

## 2019-03-07 ASSESSMENT — MIFFLIN-ST. JEOR: SCORE: 1311.33

## 2019-03-07 NOTE — NURSING NOTE
"Chief Complaint   Patient presents with     Edema       Initial /72 (BP Location: Left arm, Patient Position: Sitting, Cuff Size: Adult Large)   Pulse 78   Temp 97.4  F (36.3  C) (Tympanic)   Resp 14   Ht 1.575 m (5' 2\")   Wt 70.3 kg (155 lb)   SpO2 99%   BMI 28.35 kg/m   Estimated body mass index is 28.35 kg/m  as calculated from the following:    Height as of this encounter: 1.575 m (5' 2\").    Weight as of this encounter: 70.3 kg (155 lb).  Medication Reconciliation: complete    Margot Padron LPN    "

## 2019-04-18 DIAGNOSIS — R73.09 ELEVATED GLUCOSE: ICD-10-CM

## 2019-04-18 NOTE — LETTER
[place] QUIROZ  [facility]  [address] [place] [address]                  Gita Jones  [address]  Located within [facility] [address]            April 18, 2019       Dear Gita Jones,    CARLYN REMINDER:       Our record indicates that it is time for you to be seen for an office visit with AMARJIT Mcintosh.  You may call our office at [phone]  to schedule an appointment for a diabetic follow up.  Please disregard this notice if you have already made an appointment.      Sincerely,    AMARJIT Mcintosh    MRN: [id number]

## 2019-04-18 NOTE — TELEPHONE ENCOUNTER
blood glucose monitoring (ONETOUCH VERIO IQ) test strip  Last Written Prescription Date:  5/24/18  Last Fill Quantity: 100,   # refills: 11  Last Office Visit: 3/7/19  Future Office visit:

## 2019-07-18 ENCOUNTER — OFFICE VISIT (OUTPATIENT)
Dept: FAMILY MEDICINE | Facility: OTHER | Age: 44
End: 2019-07-18
Attending: NURSE PRACTITIONER
Payer: COMMERCIAL

## 2019-07-18 VITALS
WEIGHT: 165 LBS | BODY MASS INDEX: 30.36 KG/M2 | DIASTOLIC BLOOD PRESSURE: 70 MMHG | OXYGEN SATURATION: 99 % | HEIGHT: 62 IN | SYSTOLIC BLOOD PRESSURE: 120 MMHG | TEMPERATURE: 97.9 F | HEART RATE: 77 BPM | RESPIRATION RATE: 14 BRPM

## 2019-07-18 DIAGNOSIS — R14.0 ABDOMINAL BLOATING: ICD-10-CM

## 2019-07-18 DIAGNOSIS — I10 EXERTIONAL HYPERTENSION: Primary | ICD-10-CM

## 2019-07-18 DIAGNOSIS — R73.01 ELEVATED FASTING GLUCOSE: ICD-10-CM

## 2019-07-18 DIAGNOSIS — E03.9 ACQUIRED HYPOTHYROIDISM: ICD-10-CM

## 2019-07-18 LAB
ALBUMIN SERPL-MCNC: 4.2 G/DL (ref 3.4–5)
ALP SERPL-CCNC: 83 U/L (ref 40–150)
ALT SERPL W P-5'-P-CCNC: 20 U/L (ref 0–50)
ANION GAP SERPL CALCULATED.3IONS-SCNC: 4 MMOL/L (ref 3–14)
AST SERPL W P-5'-P-CCNC: 15 U/L (ref 0–45)
BILIRUB SERPL-MCNC: 0.6 MG/DL (ref 0.2–1.3)
BUN SERPL-MCNC: 13 MG/DL (ref 7–30)
CALCIUM SERPL-MCNC: 8.7 MG/DL (ref 8.5–10.1)
CHLORIDE SERPL-SCNC: 110 MMOL/L (ref 94–109)
CO2 SERPL-SCNC: 25 MMOL/L (ref 20–32)
CREAT SERPL-MCNC: 0.85 MG/DL (ref 0.52–1.04)
ERYTHROCYTE [DISTWIDTH] IN BLOOD BY AUTOMATED COUNT: 14.2 % (ref 10–15)
GFR SERPL CREATININE-BSD FRML MDRD: 83 ML/MIN/{1.73_M2}
GLUCOSE SERPL-MCNC: 114 MG/DL (ref 70–99)
HCT VFR BLD AUTO: 37.2 % (ref 35–47)
HGB BLD-MCNC: 12.4 G/DL (ref 11.7–15.7)
MCH RBC QN AUTO: 25.9 PG (ref 26.5–33)
MCHC RBC AUTO-ENTMCNC: 33.3 G/DL (ref 31.5–36.5)
MCV RBC AUTO: 78 FL (ref 78–100)
PLATELET # BLD AUTO: 197 10E9/L (ref 150–450)
POTASSIUM SERPL-SCNC: 4.2 MMOL/L (ref 3.4–5.3)
PROT SERPL-MCNC: 7 G/DL (ref 6.8–8.8)
RBC # BLD AUTO: 4.78 10E12/L (ref 3.8–5.2)
SODIUM SERPL-SCNC: 139 MMOL/L (ref 133–144)
TSH SERPL DL<=0.005 MIU/L-ACNC: 3.71 MU/L (ref 0.4–4)
WBC # BLD AUTO: 5.8 10E9/L (ref 4–11)

## 2019-07-18 PROCEDURE — 84443 ASSAY THYROID STIM HORMONE: CPT | Mod: ZL | Performed by: NURSE PRACTITIONER

## 2019-07-18 PROCEDURE — 80053 COMPREHEN METABOLIC PANEL: CPT | Mod: ZL | Performed by: NURSE PRACTITIONER

## 2019-07-18 PROCEDURE — 83516 IMMUNOASSAY NONANTIBODY: CPT | Mod: ZL | Performed by: NURSE PRACTITIONER

## 2019-07-18 PROCEDURE — 83036 HEMOGLOBIN GLYCOSYLATED A1C: CPT | Mod: ZL | Performed by: NURSE PRACTITIONER

## 2019-07-18 PROCEDURE — 40000788 ZZHCL STATISTIC ESTIMATED AVERAGE GLUCOSE: Mod: ZL | Performed by: NURSE PRACTITIONER

## 2019-07-18 PROCEDURE — 36415 COLL VENOUS BLD VENIPUNCTURE: CPT | Mod: ZL | Performed by: NURSE PRACTITIONER

## 2019-07-18 PROCEDURE — 99000 SPECIMEN HANDLING OFFICE-LAB: CPT | Performed by: NURSE PRACTITIONER

## 2019-07-18 PROCEDURE — 85027 COMPLETE CBC AUTOMATED: CPT | Mod: ZL | Performed by: NURSE PRACTITIONER

## 2019-07-18 PROCEDURE — 99214 OFFICE O/P EST MOD 30 MIN: CPT | Performed by: NURSE PRACTITIONER

## 2019-07-18 PROCEDURE — G0463 HOSPITAL OUTPT CLINIC VISIT: HCPCS

## 2019-07-18 RX ORDER — METOPROLOL SUCCINATE 25 MG/1
25 TABLET, EXTENDED RELEASE ORAL DAILY
Qty: 30 TABLET | Refills: 1 | Status: SHIPPED | OUTPATIENT
Start: 2019-07-18 | End: 2019-08-23

## 2019-07-18 ASSESSMENT — PAIN SCALES - GENERAL: PAINLEVEL: SEVERE PAIN (6)

## 2019-07-18 ASSESSMENT — MIFFLIN-ST. JEOR: SCORE: 1356.69

## 2019-07-18 NOTE — PROGRESS NOTES
Subjective     Gita Jones is a 43 year old female who presents to clinic today for the following health issues:    HPI   Headaches      Duration: Ongoing and now worsening    Description  Location: top and back of head   Character: throbbing pain, sharp pain, pressure  Frequency:  everyday  Duration:  All day    Intensity:  moderate    Accompanying signs and symptoms:    Precipitating or Alleviating factors:  Nausea/vomiting: nausea  Dizziness: no  Weakness or numbness: always  Visual changes: yes  Fever: YES- feels like low grade  Sinus or URI symptoms no     History  Head trauma: YES- as a teen  Family history of migraines: YES- father post stroke  Previous tests for headaches: YES  Neurologist evaluations: no   Able to do daily activities when headache present: YES- with difficulty  Wake with headaches: YES  Daily pain medication use: YES  Any changes in: none    Precipitating or Alleviating factors (light/sound/sleep/caffeine): none    Therapies tried and outcome: tylenol pm and Tylenol    Outcome -   Frequent/daily pain medication use:   States these headaches are different than migraines.        Upon further questioning - She had a stress test due to chest pain; no ischemia noted but did have a hypertensive response to exercise.  She was started on metoprolol 25mg BID by cardiology; however she developed edema and saw Dr Driscoll who stopped metoprolol and started hydrochlorothiazide.  Gita noticed she was getting dehydrated so she stopped hydrochlorothiazide and restarted metoprolol 1 tablet as needed.  Is not taking daily as prescribed, headaches are worsening.     Patient Active Problem List   Diagnosis     Hypothyroidism     History of pelvic mass     Constipation     Social anxiety disorder     Migraine     ACP (advance care planning)     Migraine without aura and without status migrainosus, not intractable     Blood glucose elevated     Past Surgical History:   Procedure Laterality Date      APPENDECTOMY        SECTION        SECTION       HERNIORRHAPHY UMBILICAL  2013    Procedure: HERNIORRHAPHY UMBILICAL;  OPEN SUPRA UMBILICAL HERNIA REPAIR WITH MESH;  Surgeon: Shante Goss DO;  Location: [facility] OR     lymph node bx neck-benign         Social History     Tobacco Use     Smoking status: Former Smoker     Packs/day: 2.00     Years: 10.00     Pack years: 20.00     Types: Cigarettes     Last attempt to quit: 2000     Years since quittin.5     Smokeless tobacco: Never Used     Tobacco comment: quit in . no passive exposure.   Substance Use Topics     Alcohol use: No     Family History   Problem Relation Age of Onset     Cancer Mother 54        cause of death     Diabetes Father      Lipids Father         hyperlipidemia     Hypertension Father      Cerebrovascular Disease Father      Neurologic Disorder Son         seizures         Current Outpatient Medications   Medication Sig Dispense Refill     aspirin (ASA) 81 MG tablet Take 81 mg by mouth       blood glucose (ONETOUCH VERIO IQ) test strip Use to test blood sugar 4 times daily. 100 each 0     blood glucose monitoring (ONE TOUCH DELICA) lancets Use to test blood sugar 4 times daily. 100 each 0     cyclobenzaprine (FLEXERIL) 5 MG tablet Take 1 tablet by mouth 3 times daily as needed  0     hydrochlorothiazide (HYDRODIURIL) 12.5 MG tablet Take 1 tablet (12.5 mg) by mouth daily 30 tablet 2     levothyroxine (SYNTHROID/LEVOTHROID) 150 MCG tablet Take 1 tablet (150 mcg) by mouth daily 90 tablet 3     multivitamin, therapeutic with minerals (THERA-VIT-M) TABS Take 1 tablet by mouth daily.       pantoprazole (PROTONIX) 40 MG EC tablet Take 1 tablet (40 mg) by mouth daily 90 tablet 0     SUMAtriptan (IMITREX) 25 MG tablet Take 1 tablet (25 mg) by mouth at onset of headache for migraine 30 tablet 3     Allergies   Allergen Reactions     Dye [Contrast Dye] Rash     IV dye, iodine containing contrast media     Nickel Rash  "    Recent Labs   Lab Test 03/07/19  1016 03/06/19  1153 01/20/19 12/13/18  1617 06/19/18  1113  08/22/17  1232 05/26/17  1051 08/18/16  1840   A1C  --   --   --   --  5.4  --  5.2  --   --    LDL  --   --   --   --  86  --   --  94  --    HDL  --   --   --   --  42*  --   --  46*  --    TRIG  --   --   --   --  110  --   --  157*  --    ALT  --  31 38*  --   --   --   --   --  20   CR  --  0.80 0.82  --   --    < >  --   --  0.68   GFRESTIMATED  --  89 >60  --   --   --   --   --  >90  Non  GFR Calc     GFRESTBLACK  --  >90  --   --   --   --   --   --  >90  African American GFR Calc     POTASSIUM  --  3.7 3.4  --   --    < >  --   --  3.6   TSH 1.71  --   --  0.02* 0.11*  --   --  0.02* 13.35*    < > = values in this interval not displayed.      BP Readings from Last 3 Encounters:   07/18/19 120/70   03/07/19 116/72   03/06/19 130/85    Wt Readings from Last 3 Encounters:   07/18/19 74.8 kg (165 lb)   03/07/19 70.3 kg (155 lb)   03/06/19 70.3 kg (155 lb)                 Reviewed and updated as needed this visit by Provider         Review of Systems   ROS COMP: Constitutional, HEENT, cardiovascular, pulmonary, gi and gu systems are negative, except as otherwise noted.    Requesting testing for gluten allergy - states gets bloated when eating grains.        Objective    /70 (BP Location: Left arm, Patient Position: Sitting, Cuff Size: Adult Regular)   Pulse 77   Temp 97.9  F (36.6  C) (Tympanic)   Resp 14   Ht 1.575 m (5' 2\")   Wt 74.8 kg (165 lb)   SpO2 99%   BMI 30.18 kg/m    Body mass index is 30.18 kg/m .  Physical Exam   GENERAL: healthy, alert and no distress  NECK: no adenopathy, no asymmetry, masses, or scars and thyroid normal to palpation  RESP: lungs clear to auscultation - no rales, rhonchi or wheezes  CV: regular rate and rhythm, normal S1 S2, no S3 or S4, no murmur, click or rub, no peripheral edema and peripheral pulses strong  MS: no gross musculoskeletal defects noted, " "no edema  NEURO: Normal strength and tone, sensory exam grossly normal, mentation intact and cranial nerves 2-12 intact  PSYCH: mentation appears normal, affect normal/bright            Assessment & Plan     1. Exertional hypertension  Stop plain metoprolol and hydrochlorothiazide   - start metoprolol succinate ER (TOPROL-XL) 25 MG 24 hr tablet; Take 1 tablet (25 mg) by mouth daily  Dispense: 30 tablet; Refill: 1  - Comprehensive metabolic panel  - CBC with platelets    2. Acquired hypothyroidism  - TSH with free T4 reflex    3. Abdominal bloating  - Tissue transglutaminase juan IgA and IgG     BMI:   Estimated body mass index is 30.18 kg/m  as calculated from the following:    Height as of this encounter: 1.575 m (5' 2\").    Weight as of this encounter: 74.8 kg (165 lb).   Weight management plan: Discussed healthy diet and exercise guidelines        FUTURE APPOINTMENTS:       - Follow-up visit in 2 weeks or as needed for acute concerns.       Maria Del Rosario Alvarez NP  [facility]      "

## 2019-07-18 NOTE — NURSING NOTE
"Chief Complaint   Patient presents with     Headache       Initial /70 (BP Location: Left arm, Patient Position: Sitting, Cuff Size: Adult Regular)   Pulse 77   Temp 97.9  F (36.6  C) (Tympanic)   Resp 14   Ht 1.575 m (5' 2\")   Wt 74.8 kg (165 lb)   SpO2 99%   BMI 30.18 kg/m   Estimated body mass index is 30.18 kg/m  as calculated from the following:    Height as of this encounter: 1.575 m (5' 2\").    Weight as of this encounter: 74.8 kg (165 lb).  Medication Reconciliation: complete   Margot Padron      "

## 2019-07-18 NOTE — PATIENT INSTRUCTIONS
"        Assessment & Plan     1. Exertional hypertension  Stop plain metoprolol and hydrochlorothiazide   - start metoprolol succinate ER (TOPROL-XL) 25 MG 24 hr tablet; Take 1 tablet (25 mg) by mouth daily  Dispense: 30 tablet; Refill: 1  - Comprehensive metabolic panel  - CBC with platelets    2. Acquired hypothyroidism  - TSH with free T4 reflex    3. Abdominal bloating  - Tissue transglutaminase juan IgA and IgG     BMI:   Estimated body mass index is 30.18 kg/m  as calculated from the following:    Height as of this encounter: 1.575 m (5' 2\").    Weight as of this encounter: 74.8 kg (165 lb).   Weight management plan: Discussed healthy diet and exercise guidelines        FUTURE APPOINTMENTS:       - Follow-up visit in 2 weeks or as needed for acute concerns.       Maria Del Rosario Alvarez NP  [facility]      "

## 2019-07-22 LAB
EST. AVERAGE GLUCOSE BLD GHB EST-MCNC: 111 MG/DL
HBA1C MFR BLD: 5.5 % (ref 0–5.6)
TTG IGA SER-ACNC: 1 U/ML
TTG IGG SER-ACNC: <1 U/ML

## 2019-08-02 ENCOUNTER — TELEPHONE (OUTPATIENT)
Dept: FAMILY MEDICINE | Facility: OTHER | Age: 44
End: 2019-08-02

## 2019-08-02 DIAGNOSIS — K04.7 DENTAL ABSCESS: Primary | ICD-10-CM

## 2019-08-02 RX ORDER — CLINDAMYCIN HCL 300 MG
CAPSULE ORAL
Qty: 40 CAPSULE | Refills: 0 | Status: SHIPPED | OUTPATIENT
Start: 2019-08-02 | End: 2020-05-28

## 2019-08-02 NOTE — TELEPHONE ENCOUNTER
Pt confirmed contact number: [phone]    Pt called complains of infected tooth, experiencing pain and discomfort when eating, swelling present. Pt reports she went to the [facility] ER on 7/17/19 was prescribed clindamycin 300 mg, take one table four times a day. Received 40 tabs. Pt reported she can see the dentist in two weeks and has an appointment, but unable to see the dentist any sooner. Dentist advised pt to call PCP for additional antibiotics.     Script prepared for covering provider to advise PCP out.    Thank you

## 2019-08-05 DIAGNOSIS — E03.9 ACQUIRED HYPOTHYROIDISM: ICD-10-CM

## 2019-08-05 RX ORDER — LEVOTHYROXINE SODIUM 150 UG/1
150 TABLET ORAL DAILY
Qty: 90 TABLET | Refills: 3 | Status: SHIPPED | OUTPATIENT
Start: 2019-08-05 | End: 2019-12-04

## 2019-08-05 NOTE — TELEPHONE ENCOUNTER
Synthroid       Last Written Prescription Date:  12/20/2018  Last Fill Quantity: 90,   # refills: 3  Last Office Visit: 7/18/2019  Future Office visit:    Next 5 appointments (look out 90 days)    Aug 14, 2019  4:00 PM CDT  (Arrive by 3:45 PM)  SHORT with Maria Del Rosario Alvarez NP  [facility] ([facility] ) [address]  [facility] [address]  [phone]

## 2019-08-23 DIAGNOSIS — I10 EXERTIONAL HYPERTENSION: ICD-10-CM

## 2019-08-26 RX ORDER — METOPROLOL SUCCINATE 25 MG/1
TABLET, EXTENDED RELEASE ORAL
Qty: 30 TABLET | Refills: 0 | Status: SHIPPED | OUTPATIENT
Start: 2019-08-26 | End: 2020-04-24

## 2019-10-01 ENCOUNTER — HOSPITAL ENCOUNTER (EMERGENCY)
Facility: HOSPITAL | Age: 44
Discharge: LEFT BEFORE DISCHARGE | End: 2019-10-01
Attending: PHYSICIAN ASSISTANT | Admitting: PHYSICIAN ASSISTANT

## 2019-10-01 VITALS
TEMPERATURE: 98.4 F | HEART RATE: 73 BPM | OXYGEN SATURATION: 100 % | DIASTOLIC BLOOD PRESSURE: 79 MMHG | SYSTOLIC BLOOD PRESSURE: 140 MMHG

## 2019-10-01 DIAGNOSIS — Z53.21 PATIENT LEFT WITHOUT BEING SEEN: ICD-10-CM

## 2019-10-01 PROCEDURE — 40000268 ZZH STATISTIC NO CHARGES
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
## 2019-10-01 NOTE — ED NOTES
"AMA form signed by patient at this time. States her  \"is on heart medication and I need to get it to him. I'll go be seen in [place].\"  "

## 2019-10-01 NOTE — ED PROVIDER NOTES
History     Chief Complaint   Patient presents with     Abdominal Pain     HPI  Gita Jones is a 44 year old female who left without being seen.    Allergies:  Allergies   Allergen Reactions     Dye [Contrast Dye] Rash     IV dye, iodine containing contrast media     Nickel Rash       Problem List:    Patient Active Problem List    Diagnosis Date Noted     Exertional hypertension 07/18/2019     Priority: Medium     Blood glucose elevated 09/08/2017     Priority: Medium     ACP (advance care planning) 05/20/2016     Priority: Medium     Advance Care Planning 5/20/2016: ACP Review of Chart / Resources Provided:  Reviewed chart for advance care plan.  Gita Jones has been provided information and resources to begin or update their advance care plan.  Added by Gita Yeung             Social anxiety disorder 09/17/2015     Priority: Medium     Constipation 12/12/2014     Priority: Medium     History of pelvic mass 12/04/2014     Priority: Medium     Hypothyroidism 07/12/2013     Priority: Medium     Migraine 09/23/2009     Priority: Medium     Overview:   IMO Update 10/11       Migraine without aura and without status migrainosus, not intractable 09/23/2009     Priority: Medium     Overview:   IMO Update 10/11          Past Medical History:    Past Medical History:   Diagnosis Date     Abdominal pain, unspecified site 9/13/2002     Abnormal feces 2/24/2004     Abnormal maternal glucose tolerance, antepartum 2/20/2001     Acute sinusitis, unspecified 4/7/2005     Chest pain, unspecified 12/12/2003     Diarrhea 1/12/2004     Dizziness and giddiness 5/24/2008     Headache(784.0) 5/17/2002     History of pelvic mass 12/4/2014     hypothyroidism 5/22/2012     Irregular menstrual cycle 11/9/2003     Migraine, unspecified, without mention of intractable migraine without mention of status migrainosus 7/28/2004     Other disorder of menstruation and other abnormal bleeding from female genital tract 5/1/2006      Other malaise and fatigue 2002     Other specified hemorrhage in early pregnancy, antepartum 2000     Social anxiety disorder 2015     Supervision of other normal pregnancy 2000     Syncope and collapse 2003     Unspecified symptom associated with female genital organs 2002       Past Surgical History:    Past Surgical History:   Procedure Laterality Date     APPENDECTOMY        SECTION        SECTION       HERNIORRHAPHY UMBILICAL  2013    Procedure: HERNIORRHAPHY UMBILICAL;  OPEN SUPRA UMBILICAL HERNIA REPAIR WITH MESH;  Surgeon: Shante Goss DO;  Location: [facility] OR     lymph node bx neck-benign         Family History:    Family History   Problem Relation Age of Onset     Cancer Mother 54        cause of death     Diabetes Father      Lipids Father         hyperlipidemia     Hypertension Father      Cerebrovascular Disease Father      Neurologic Disorder Son         seizures       Social History:  Marital Status:   [2]  Social History     Tobacco Use     Smoking status: Former Smoker     Packs/day: 2.00     Years: 10.00     Pack years: 20.00     Types: Cigarettes     Last attempt to quit: 2000     Years since quittin.7     Smokeless tobacco: Never Used     Tobacco comment: quit in . no passive exposure.   Substance Use Topics     Alcohol use: No     Drug use: No        Medications:    aspirin (ASA) 81 MG tablet  blood glucose (ONETOUCH VERIO IQ) test strip  blood glucose monitoring (ONE TOUCH DELICA) lancets  blood glucose monitoring (ONETOUCH VERIO IQ SYSTEM) meter device kit  clindamycin (CLEOCIN) 300 MG capsule  cyclobenzaprine (FLEXERIL) 5 MG tablet  levothyroxine (SYNTHROID/LEVOTHROID) 150 MCG tablet  metoprolol succinate ER (TOPROL-XL) 25 MG 24 hr tablet  multivitamin, therapeutic with minerals (THERA-VIT-M) TABS  pantoprazole (PROTONIX) 40 MG EC tablet  SUMAtriptan (IMITREX) 25 MG tablet          Review of Systems    Physical  Exam   BP: 140/79  Pulse: 73  Temp: 98.4  F (36.9  C)  SpO2: 100 %      Physical Exam    ED Course        Procedures               Critical Care time:  none               No results found for this or any previous visit (from the past 24 hour(s)).    Medications - No data to display    Assessments & Plan (with Medical Decision Making)     I have reviewed the nursing notes.    I have reviewed the findings, diagnosis, plan and need for follow up with the patient.       Discharge Medication List as of 10/1/2019 11:14 AM          Final diagnoses:   Patient left without being seen       10/1/2019   [facility]     Cristian Espinoza PA-C  10/01/19 [id number]

## 2019-10-02 DIAGNOSIS — K21.00 GASTROESOPHAGEAL REFLUX DISEASE WITH ESOPHAGITIS: ICD-10-CM

## 2019-10-03 RX ORDER — PANTOPRAZOLE SODIUM 40 MG/1
40 TABLET, DELAYED RELEASE ORAL DAILY
Qty: 90 TABLET | Refills: 0 | Status: SHIPPED | OUTPATIENT
Start: 2019-10-03 | End: 2020-03-11

## 2019-10-16 ENCOUNTER — E-VISIT (OUTPATIENT)
Dept: FAMILY MEDICINE | Facility: OTHER | Age: 44
End: 2019-10-16
Payer: COMMERCIAL

## 2019-10-16 DIAGNOSIS — K04.7 DENTAL INFECTION: Primary | ICD-10-CM

## 2019-10-16 PROCEDURE — 99444 ZZC PHYSICIAN ONLINE EVALUATION & MANAGEMENT SERVICE: CPT | Performed by: NURSE PRACTITIONER

## 2019-10-17 RX ORDER — PENICILLIN V POTASSIUM 500 MG/1
500 TABLET, FILM COATED ORAL 3 TIMES DAILY
Qty: 30 TABLET | Refills: 0 | Status: SHIPPED | OUTPATIENT
Start: 2019-10-17 | End: 2019-12-04

## 2019-10-17 NOTE — TELEPHONE ENCOUNTER
ELECTRONIC VISIT DOCUMENTATION:    SUBJECTIVE:  Note above accurately captures the substance of my conversation with the patient.    ASSESSMENT/ PLAN:  1. Dental infection  - penicillin V (VEETID) 500 MG tablet; Take 1 tablet (500 mg) by mouth 3 times daily for 10 days  Dispense: 30 tablet; Refill: 0    Follow-up if no improvement    Maria Del Rosario Alvarez,   Certified Adult Nurse Practitioner  [phone]

## 2019-11-29 NOTE — PROGRESS NOTES
Michaela Jones is a 44 year old female who presents to clinic today for the following health issues:    HPI   Hypertension Follow-up      Do you check your blood pressure regularly outside of the clinic? Yes     Are you following a low salt diet? Yes    Are your blood pressures ever more than 140 on the top number (systolic) OR more   than 90 on the bottom number (diastolic), for example 140/90? No   Feels bloated, fingers and lower extremities by the end of the day - does try to watch sodium intake.      Hypothyroidism Follow-up      Since last visit, patient describes the following symptoms: weight gain of 20lbs lbs, dry skin, constipation, loose stools and fatigue    Has been out of meds for the past week.       How many servings of fruits and vegetables do you eat daily?  4 or more    On average, how many sweetened beverages do you drink each day (Examples: soda, juice, sweet tea, etc.  Do NOT count diet or artificially sweetened beverages)?   1    How many days per week do you miss taking your medication? 0    Abdominal Pain      Duration: a few months    Description (location/character/radiation): upper abdominal pain, bloating       Associated flank pain: middle back pain    Intensity:  moderate    Accompanying signs and symptoms:        Fever/Chills: YES       Gas/Bloating: YES       Nausea/vomitting: YES       Diarrhea: YES       Dysuria or Hematuria: no     History (previous similar pain/trauma/previous testing): has been to the ER    Precipitating or alleviating factors:       Pain worse with eating/BM/urination: worse after eating       Pain relieved by BM: no     Therapies tried and outcome: None    LMP:  About a week ago      Patient Active Problem List   Diagnosis     Hypothyroidism     History of pelvic mass     Constipation     Social anxiety disorder     Migraine     ACP (advance care planning)     Migraine without aura and without status migrainosus, not intractable     Blood  glucose elevated     Exertional hypertension     Past Surgical History:   Procedure Laterality Date     APPENDECTOMY        SECTION        SECTION       HERNIORRHAPHY UMBILICAL  2013    Procedure: HERNIORRHAPHY UMBILICAL;  OPEN SUPRA UMBILICAL HERNIA REPAIR WITH MESH;  Surgeon: Shante Goss DO;  Location: [facility]     lymph node bx neck-benign         Social History     Tobacco Use     Smoking status: Former Smoker     Packs/day: 2.00     Years: 10.00     Pack years: 20.00     Types: Cigarettes     Last attempt to quit: 2000     Years since quittin.9     Smokeless tobacco: Never Used     Tobacco comment: quit in . no passive exposure.   Substance Use Topics     Alcohol use: No     Family History   Problem Relation Age of Onset     Cancer Mother 54        cause of death     Diabetes Father      Lipids Father         hyperlipidemia     Hypertension Father      Cerebrovascular Disease Father      Neurologic Disorder Son         seizures         Current Outpatient Medications   Medication Sig Dispense Refill     aspirin (ASA) 81 MG tablet Take 81 mg by mouth       blood glucose (ONETOUCH VERIO IQ) test strip Use to test blood sugar 4 times daily. 100 each 0     blood glucose monitoring (ONE TOUCH DELICA) lancets Use to test blood sugar 4 times daily. 100 each 0     blood glucose monitoring (ONETOUCH VERIO IQ SYSTEM) meter device kit USE TO TEST BLOOD SUGARS FOUR TIMES DAILY 1 kit 0     clindamycin (CLEOCIN) 300 MG capsule Take one capsule by mouth four times a day. For 10 days 40 capsule 0     cyclobenzaprine (FLEXERIL) 5 MG tablet Take 1 tablet by mouth 3 times daily as needed  0     hydrochlorothiazide (HYDRODIURIL) 25 MG tablet Take 1 tablet (25 mg) by mouth daily 30 tablet 1     levothyroxine (SYNTHROID/LEVOTHROID) 150 MCG tablet Take 1 tablet (150 mcg) by mouth daily 90 tablet 3     metoprolol succinate ER (TOPROL-XL) 25 MG 24 hr tablet TAKE 1 TABLET(25 MG) BY MOUTH DAILY  "30 tablet 0     multivitamin, therapeutic with minerals (THERA-VIT-M) TABS Take 1 tablet by mouth daily.       pantoprazole (PROTONIX) 40 MG EC tablet Take 1 tablet (40 mg) by mouth daily 90 tablet 0     SUMAtriptan (IMITREX) 25 MG tablet TAKE 1 TABLET(25 MG) BY MOUTH AT ONSET OF HEADACHE FOR MIGRAINE 30 tablet 0     Allergies   Allergen Reactions     Dye [Contrast Dye] Rash     IV dye, iodine containing contrast media     Nickel Rash     Recent Labs   Lab Test 07/18/19  0922 03/07/19  1016 03/06/19  1153 01/20/19 06/19/18  1113  08/22/17  1232 05/26/17  1051   A1C 5.5  --   --   --   --  5.4  --  5.2  --    LDL  --   --   --   --   --  86  --   --  94   HDL  --   --   --   --   --  42*  --   --  46*   TRIG  --   --   --   --   --  110  --   --  157*   ALT 20  --  31 38*  --   --   --   --   --    CR 0.85  --  0.80 0.82  --   --    < >  --   --    GFRESTIMATED 83  --  89 >60  --   --   --   --   --    GFRESTBLACK >90  --  >90  --   --   --   --   --   --    POTASSIUM 4.2  --  3.7 3.4  --   --    < >  --   --    TSH 3.71 1.71  --   --    < > 0.11*  --   --  0.02*    < > = values in this interval not displayed.      BP Readings from Last 3 Encounters:   12/04/19 (!) 144/76   10/01/19 140/79   07/18/19 120/70    Wt Readings from Last 3 Encounters:   12/04/19 78.4 kg (172 lb 14.4 oz)   07/18/19 74.8 kg (165 lb)   03/07/19 70.3 kg (155 lb)                    Reviewed and updated as needed this visit by Provider         Review of Systems   ROS COMP: Constitutional, HEENT, cardiovascular, pulmonary, gi and gu systems are negative, except as otherwise noted.      Objective    BP (!) 144/76 (BP Location: Right arm, Patient Position: Sitting, Cuff Size: Adult Regular)   Pulse 82   Temp 98.3  F (36.8  C) (Tympanic)   Resp 16   Ht 1.575 m (5' 2\")   Wt 78.4 kg (172 lb 14.4 oz)   SpO2 98%   BMI 31.62 kg/m    Body mass index is 31.62 kg/m .  Physical Exam   GENERAL: healthy, alert and no distress  NECK: no adenopathy, no " "asymmetry, masses, or scars, thyroid normal to palpation and no carotid bruits  RESP: lungs clear to auscultation - no rales, rhonchi or wheezes  CV: regular rate and rhythm, normal S1 S2, no S3 or S4, no murmur, click or rub, no peripheral edema and peripheral pulses strong  ABDOMEN: soft, nontender, no hepatosplenomegaly, no masses and bowel sounds normal  MS: no gross musculoskeletal defects noted, no edema  PSYCH: mentation appears normal, affect normal/bright            Assessment & Plan     1. Abdominal pain, right upper quadrant  - US Abdomen Limited; Future  - referral to Dr Fox for evaluation.     2. Abdominal pain, left upper quadrant  - US Abdomen Limited; Future    3. Exertional hypertension  - hydrochlorothiazide (HYDRODIURIL) 25 MG tablet; Take 1 tablet (25 mg) by mouth daily  Dispense: 30 tablet; Refill: 1  - Lipid Profile  - Hepatic panel  - Basic metabolic panel    4. Blood glucose elevated  - Hemoglobin A1c    5. Acquired hypothyroidism  - TSH with free T4 reflex  - levothyroxine (SYNTHROID/LEVOTHROID) 150 MCG tablet; Take 1 tablet (150 mcg) by mouth daily  Dispense: 90 tablet; Refill: 3    6. Enlargement of spleen  - US Abdomen Limited; Future     BMI:   Estimated body mass index is 31.62 kg/m  as calculated from the following:    Height as of this encounter: 1.575 m (5' 2\").    Weight as of this encounter: 78.4 kg (172 lb 14.4 oz).   Weight management plan: Discussed healthy diet and exercise guidelines        Return in about 4 weeks (around 1/1/2020) for hypertension, fluid retention .    Maria Del Rosario Alvarez NP  [facility]      "

## 2019-12-04 ENCOUNTER — OFFICE VISIT (OUTPATIENT)
Dept: FAMILY MEDICINE | Facility: OTHER | Age: 44
End: 2019-12-04
Attending: NURSE PRACTITIONER
Payer: COMMERCIAL

## 2019-12-04 VITALS
BODY MASS INDEX: 31.82 KG/M2 | OXYGEN SATURATION: 98 % | HEIGHT: 62 IN | RESPIRATION RATE: 16 BRPM | HEART RATE: 82 BPM | TEMPERATURE: 98.3 F | WEIGHT: 172.9 LBS | SYSTOLIC BLOOD PRESSURE: 144 MMHG | DIASTOLIC BLOOD PRESSURE: 76 MMHG

## 2019-12-04 DIAGNOSIS — R73.9 BLOOD GLUCOSE ELEVATED: ICD-10-CM

## 2019-12-04 DIAGNOSIS — I10 EXERTIONAL HYPERTENSION: ICD-10-CM

## 2019-12-04 DIAGNOSIS — R16.1 ENLARGEMENT OF SPLEEN: ICD-10-CM

## 2019-12-04 DIAGNOSIS — R10.11 ABDOMINAL PAIN, RIGHT UPPER QUADRANT: Primary | ICD-10-CM

## 2019-12-04 DIAGNOSIS — R10.12 ABDOMINAL PAIN, LEFT UPPER QUADRANT: ICD-10-CM

## 2019-12-04 DIAGNOSIS — E03.9 ACQUIRED HYPOTHYROIDISM: ICD-10-CM

## 2019-12-04 PROCEDURE — 99214 OFFICE O/P EST MOD 30 MIN: CPT | Performed by: NURSE PRACTITIONER

## 2019-12-04 PROCEDURE — 84443 ASSAY THYROID STIM HORMONE: CPT | Mod: ZL | Performed by: NURSE PRACTITIONER

## 2019-12-04 PROCEDURE — 83036 HEMOGLOBIN GLYCOSYLATED A1C: CPT | Mod: ZL | Performed by: NURSE PRACTITIONER

## 2019-12-04 PROCEDURE — 40000788 ZZHCL STATISTIC ESTIMATED AVERAGE GLUCOSE: Mod: ZL | Performed by: NURSE PRACTITIONER

## 2019-12-04 PROCEDURE — 80061 LIPID PANEL: CPT | Mod: ZL | Performed by: NURSE PRACTITIONER

## 2019-12-04 PROCEDURE — G0463 HOSPITAL OUTPT CLINIC VISIT: HCPCS

## 2019-12-04 PROCEDURE — 36415 COLL VENOUS BLD VENIPUNCTURE: CPT | Mod: ZL | Performed by: NURSE PRACTITIONER

## 2019-12-04 PROCEDURE — 80048 BASIC METABOLIC PNL TOTAL CA: CPT | Mod: ZL | Performed by: NURSE PRACTITIONER

## 2019-12-04 PROCEDURE — 80076 HEPATIC FUNCTION PANEL: CPT | Mod: ZL | Performed by: NURSE PRACTITIONER

## 2019-12-04 PROCEDURE — 84439 ASSAY OF FREE THYROXINE: CPT | Mod: ZL | Performed by: NURSE PRACTITIONER

## 2019-12-04 RX ORDER — HYDROCHLOROTHIAZIDE 25 MG/1
25 TABLET ORAL DAILY
Qty: 30 TABLET | Refills: 1 | Status: SHIPPED | OUTPATIENT
Start: 2019-12-04 | End: 2020-02-20

## 2019-12-04 RX ORDER — LEVOTHYROXINE SODIUM 150 UG/1
150 TABLET ORAL DAILY
Qty: 90 TABLET | Refills: 3 | Status: SHIPPED | OUTPATIENT
Start: 2019-12-04 | End: 2020-11-25

## 2019-12-04 ASSESSMENT — ANXIETY QUESTIONNAIRES
7. FEELING AFRAID AS IF SOMETHING AWFUL MIGHT HAPPEN: NOT AT ALL
IF YOU CHECKED OFF ANY PROBLEMS ON THIS QUESTIONNAIRE, HOW DIFFICULT HAVE THESE PROBLEMS MADE IT FOR YOU TO DO YOUR WORK, TAKE CARE OF THINGS AT HOME, OR GET ALONG WITH OTHER PEOPLE: SOMEWHAT DIFFICULT
1. FEELING NERVOUS, ANXIOUS, OR ON EDGE: SEVERAL DAYS
GAD7 TOTAL SCORE: 2
2. NOT BEING ABLE TO STOP OR CONTROL WORRYING: NOT AT ALL
5. BEING SO RESTLESS THAT IT IS HARD TO SIT STILL: SEVERAL DAYS
3. WORRYING TOO MUCH ABOUT DIFFERENT THINGS: NOT AT ALL
6. BECOMING EASILY ANNOYED OR IRRITABLE: NOT AT ALL

## 2019-12-04 ASSESSMENT — PATIENT HEALTH QUESTIONNAIRE - PHQ9
SUM OF ALL RESPONSES TO PHQ QUESTIONS 1-9: 3
5. POOR APPETITE OR OVEREATING: NOT AT ALL

## 2019-12-04 ASSESSMENT — MIFFLIN-ST. JEOR: SCORE: 1387.52

## 2019-12-04 ASSESSMENT — PAIN SCALES - GENERAL: PAINLEVEL: NO PAIN (0)

## 2019-12-05 LAB
ALBUMIN SERPL-MCNC: 4 G/DL (ref 3.4–5)
ALP SERPL-CCNC: 97 U/L (ref 40–150)
ALT SERPL W P-5'-P-CCNC: 34 U/L (ref 0–50)
ANION GAP SERPL CALCULATED.3IONS-SCNC: 10 MMOL/L (ref 3–14)
AST SERPL W P-5'-P-CCNC: 27 U/L (ref 0–45)
BILIRUB DIRECT SERPL-MCNC: <0.1 MG/DL (ref 0–0.2)
BILIRUB SERPL-MCNC: 0.3 MG/DL (ref 0.2–1.3)
BUN SERPL-MCNC: 8 MG/DL (ref 7–30)
CALCIUM SERPL-MCNC: 8.9 MG/DL (ref 8.5–10.1)
CHLORIDE SERPL-SCNC: 108 MMOL/L (ref 94–109)
CHOLEST SERPL-MCNC: 248 MG/DL
CO2 SERPL-SCNC: 23 MMOL/L (ref 20–32)
CREAT SERPL-MCNC: 0.95 MG/DL (ref 0.52–1.04)
EST. AVERAGE GLUCOSE BLD GHB EST-MCNC: 120 MG/DL
GFR SERPL CREATININE-BSD FRML MDRD: 73 ML/MIN/{1.73_M2}
GLUCOSE SERPL-MCNC: 86 MG/DL (ref 70–99)
HBA1C MFR BLD: 5.8 % (ref 0–5.6)
HDLC SERPL-MCNC: 53 MG/DL
LDLC SERPL CALC-MCNC: 150 MG/DL
NONHDLC SERPL-MCNC: 195 MG/DL
POTASSIUM SERPL-SCNC: 3.9 MMOL/L (ref 3.4–5.3)
PROT SERPL-MCNC: 7.6 G/DL (ref 6.8–8.8)
SODIUM SERPL-SCNC: 141 MMOL/L (ref 133–144)
T4 FREE SERPL-MCNC: 0.27 NG/DL (ref 0.76–1.46)
TRIGL SERPL-MCNC: 223 MG/DL
TSH SERPL DL<=0.005 MIU/L-ACNC: 63.41 MU/L (ref 0.4–4)

## 2019-12-05 ASSESSMENT — ANXIETY QUESTIONNAIRES: GAD7 TOTAL SCORE: 2

## 2019-12-13 ENCOUNTER — HOSPITAL ENCOUNTER (OUTPATIENT)
Dept: ULTRASOUND IMAGING | Facility: HOSPITAL | Age: 44
Discharge: HOME | End: 2019-12-13
Attending: NURSE PRACTITIONER | Admitting: NURSE PRACTITIONER
Payer: COMMERCIAL

## 2019-12-13 DIAGNOSIS — R10.12 ABDOMINAL PAIN, LEFT UPPER QUADRANT: ICD-10-CM

## 2019-12-13 DIAGNOSIS — R10.11 ABDOMINAL PAIN, RIGHT UPPER QUADRANT: ICD-10-CM

## 2019-12-13 DIAGNOSIS — R16.1 ENLARGEMENT OF SPLEEN: ICD-10-CM

## 2019-12-13 PROCEDURE — 76700 US EXAM ABDOM COMPLETE: CPT | Mod: TC

## 2020-01-28 ENCOUNTER — OFFICE VISIT (OUTPATIENT)
Dept: CARE COORDINATION | Facility: OTHER | Age: 45
End: 2020-01-28
Attending: INTERNAL MEDICINE
Payer: COMMERCIAL

## 2020-01-28 VITALS
TEMPERATURE: 97.8 F | WEIGHT: 162 LBS | DIASTOLIC BLOOD PRESSURE: 74 MMHG | SYSTOLIC BLOOD PRESSURE: 112 MMHG | HEART RATE: 81 BPM | RESPIRATION RATE: 16 BRPM | HEIGHT: 62 IN | OXYGEN SATURATION: 98 % | BODY MASS INDEX: 29.81 KG/M2

## 2020-01-28 DIAGNOSIS — R14.0 ABDOMINAL BLOATING: ICD-10-CM

## 2020-01-28 DIAGNOSIS — R10.13 ABDOMINAL PAIN, EPIGASTRIC: Primary | ICD-10-CM

## 2020-01-28 PROCEDURE — G0463 HOSPITAL OUTPT CLINIC VISIT: HCPCS

## 2020-01-28 ASSESSMENT — PAIN SCALES - GENERAL: PAINLEVEL: NO PAIN (0)

## 2020-01-28 ASSESSMENT — MIFFLIN-ST. JEOR: SCORE: 1338.08

## 2020-01-28 NOTE — PROGRESS NOTES
Gastroenterlogy Consult      CC/REFERRING MD:  Akash Davis CNP    Chief Complaint: Epigastric pain bloating nausea      Past Medical History:  Past Medical History:   Diagnosis Date     Abdominal pain, unspecified site 9/13/2002     Abnormal feces 2/24/2004     Abnormal maternal glucose tolerance, antepartum 2/20/2001     Acute sinusitis, unspecified 4/7/2005     Chest pain, unspecified 12/12/2003     Diarrhea 1/12/2004     Dizziness and giddiness 5/24/2008     Headache(784.0) 5/17/2002     History of pelvic mass 12/4/2014     hypothyroidism 5/22/2012     Irregular menstrual cycle 11/9/2003     Migraine, unspecified, without mention of intractable migraine without mention of status migrainosus 7/28/2004     Other disorder of menstruation and other abnormal bleeding from female genital tract 5/1/2006     Other malaise and fatigue 5/7/2002     Other specified hemorrhage in early pregnancy, antepartum 9/12/2000     Social anxiety disorder 9/17/2015     Supervision of other normal pregnancy 9/12/2000     Syncope and collapse 9/8/2003     Unspecified symptom associated with female genital organs 7/26/2002         HPI:  Maryellen is a 49-year-old with a 1 year history of upper abdominal intermittent pain with bloating and frequent nausea occasional vomiting.  Her symptoms can occur after meals but also can occur on an empty stomach on average episodes occur twice per week and last several hours at a time these episodes are unpredictable occasionally the discomfort is severe and on 2 occasions they have been significant enough that she has gone to the emergency room she tells me that the work-up there is consisted of blood work and no specific diagnosis or treatment is been forthcoming.  Her symptoms never awaken her from sleep over the last year she is gained 15 to 20 pounds and yet she complains of early satiety she has mild constipation which is chronic and unchanged no particular foods worsen her symptoms she did try  antacids and that did not help otherwise there have been no empiric medical therapies.  Her review of systems is otherwise unremarkable    PREVIOUS SURGERIES:  Past Surgical History:   Procedure Laterality Date     APPENDECTOMY        SECTION        SECTION       HERNIORRHAPHY UMBILICAL  2013    Procedure: HERNIORRHAPHY UMBILICAL;  OPEN SUPRA UMBILICAL HERNIA REPAIR WITH MESH;  Surgeon: Shante Goss DO;  Location: [facility]     lymph node bx neck-benign         MEDICATIONS:    Current Outpatient Medications:      aspirin (ASA) 81 MG tablet, Take 81 mg by mouth, Disp: , Rfl:      blood glucose (ONETOUCH VERIO IQ) test strip, Use to test blood sugar 4 times daily., Disp: 100 each, Rfl: 0     blood glucose monitoring (ONE TOUCH DELICA) lancets, Use to test blood sugar 4 times daily., Disp: 100 each, Rfl: 0     blood glucose monitoring (ONETOUCH VERIO IQ SYSTEM) meter device kit, USE TO TEST BLOOD SUGARS FOUR TIMES DAILY, Disp: 1 kit, Rfl: 0     clindamycin (CLEOCIN) 300 MG capsule, Take one capsule by mouth four times a day. For 10 days, Disp: 40 capsule, Rfl: 0     cyclobenzaprine (FLEXERIL) 5 MG tablet, Take 1 tablet by mouth 3 times daily as needed, Disp: , Rfl: 0     hydrochlorothiazide (HYDRODIURIL) 25 MG tablet, Take 1 tablet (25 mg) by mouth daily, Disp: 30 tablet, Rfl: 1     levothyroxine (SYNTHROID/LEVOTHROID) 150 MCG tablet, Take 1 tablet (150 mcg) by mouth daily, Disp: 90 tablet, Rfl: 3     metoprolol succinate ER (TOPROL-XL) 25 MG 24 hr tablet, TAKE 1 TABLET(25 MG) BY MOUTH DAILY, Disp: 30 tablet, Rfl: 0     multivitamin, therapeutic with minerals (THERA-VIT-M) TABS, Take 1 tablet by mouth daily., Disp: , Rfl:      pantoprazole (PROTONIX) 40 MG EC tablet, Take 1 tablet (40 mg) by mouth daily, Disp: 90 tablet, Rfl: 0     SUMAtriptan (IMITREX) 25 MG tablet, TAKE 1 TABLET(25 MG) BY MOUTH AT ONSET OF HEADACHE FOR MIGRAINE, Disp: 30 tablet, Rfl: 0    ALLERGIES:     Allergies    Allergen Reactions     Dye [Contrast Dye] Rash     IV dye, iodine containing contrast media     Nickel Rash       FAMILY HISTORY:  Family History   Problem Relation Age of Onset     Cancer Mother 54        cause of death     Diabetes Father      Lipids Father         hyperlipidemia     Hypertension Father      Cerebrovascular Disease Father      Neurologic Disorder Son         seizures       SOCIAL HISTORY:  Social History     Socioeconomic History     Marital status:      Spouse name: Not on file     Number of children: Not on file     Years of education: Not on file     Highest education level: Not on file   Occupational History     Not on file   Social Needs     Financial resource strain: Not on file     Food insecurity:     Worry: Not on file     Inability: Not on file     Transportation needs:     Medical: Not on file     Non-medical: Not on file   Tobacco Use     Smoking status: Former Smoker     Packs/day: 2.00     Years: 10.00     Pack years: 20.00     Types: Cigarettes     Last attempt to quit: 2000     Years since quittin.0     Smokeless tobacco: Never Used     Tobacco comment: quit in . no passive exposure.   Substance and Sexual Activity     Alcohol use: No     Drug use: No     Sexual activity: Not on file   Lifestyle     Physical activity:     Days per week: Not on file     Minutes per session: Not on file     Stress: Not on file   Relationships     Social connections:     Talks on phone: Not on file     Gets together: Not on file     Attends [employer] service: Not on file     Active member of club or organization: Not on file     Attends meetings of clubs or organizations: Not on file     Relationship status: Not on file     Intimate partner violence:     Fear of current or ex partner: Not on file     Emotionally abused: Not on file     Physically abused: Not on file     Forced sexual activity: Not on file   Other Topics Concern      Service Not Asked     Blood  Transfusions Yes     Caffeine Concern Yes     Comment: coffee >6 cups per day     Occupational Exposure Not Asked     Hobby Hazards Not Asked     Sleep Concern Not Asked     Stress Concern Not Asked     Weight Concern Not Asked     Special Diet Not Asked     Back Care Not Asked     Exercise Yes     Comment: daily     Bike Helmet Not Asked     Seat Belt Yes     Self-Exams Not Asked     Parent/sibling w/ CABG, MI or angioplasty before 65F 55M? No   Social History Narrative     Not on file         PHYSICAL EXAM:  Constitutional: aaox3, cooperative, pleasant, not dyspneic/diaphoretic, no acute distress  Vitals reviewed: There were no vitals taken for this visit.  Wt:   Wt Readings from Last 2 Encounters:   12/04/19 78.4 kg (172 lb 14.4 oz)   07/18/19 74.8 kg (165 lb)      Eyes: Sclera anicteric/injected  Ears/nose/mouth/throat: Normal oropharynx without ulcers or exudate, mucus membranes moist, hearing intact  Neck: supple, thyroid normal size  CV: No edema  Respiratory: Unlabored breathing  Lymph: No axillary, submandibular, supraclavicular or inguinal lymphadenopathy  Abd: Soft nondistended, +bs, no hepatosplenomegaly, diffuse tenderness without guarding or rebound, no peritoneal signs  Skin: warm, perfused, no jaundice  Psych: Normal affect  MSK: Normal gait      ASSESSMENT/PLAN:  Abdominal pain with nausea occasional vomiting bloating as described above I think it would be reasonable to obtain an upper endoscopy and if negative I would attribute her symptoms to functional dyspepsia and consider a trial of BuSpar.  The recent ultrasound and blood work were unremarkable.      Kris Medina MD

## 2020-02-06 ENCOUNTER — RESULTS ONLY (OUTPATIENT)
Dept: LAB | Age: 45
End: 2020-02-06

## 2020-02-18 DIAGNOSIS — I10 EXERTIONAL HYPERTENSION: ICD-10-CM

## 2020-02-18 NOTE — TELEPHONE ENCOUNTER
hydrochlorothiazide (HYDRODIURIL) 25 MG tablet  Last visit date with prescribing provider: 12-4-2019  Last refill date: 12-4-2019  Quantity: 30, Refills: 1    Na Meza LPN

## 2020-02-19 LAB — COPATH REPORT: NORMAL

## 2020-02-20 RX ORDER — HYDROCHLOROTHIAZIDE 25 MG/1
TABLET ORAL
Qty: 30 TABLET | Refills: 0 | Status: SHIPPED | OUTPATIENT
Start: 2020-02-20 | End: 2021-03-12

## 2020-03-09 DIAGNOSIS — K21.00 GASTROESOPHAGEAL REFLUX DISEASE WITH ESOPHAGITIS: ICD-10-CM

## 2020-03-11 RX ORDER — PANTOPRAZOLE SODIUM 40 MG/1
TABLET, DELAYED RELEASE ORAL
Qty: 90 TABLET | Refills: 0 | Status: SHIPPED | OUTPATIENT
Start: 2020-03-11 | End: 2021-03-12 | Stop reason: ALTCHOICE

## 2020-04-23 DIAGNOSIS — I10 EXERTIONAL HYPERTENSION: ICD-10-CM

## 2020-04-24 RX ORDER — METOPROLOL SUCCINATE 25 MG/1
TABLET, EXTENDED RELEASE ORAL
Qty: 30 TABLET | Refills: 0 | Status: SHIPPED | OUTPATIENT
Start: 2020-04-24 | End: 2020-09-18

## 2020-04-24 NOTE — TELEPHONE ENCOUNTER
metoprolol      Last Written Prescription Date:  *8/26/19**  Last Fill Quantity: 30,   # refills: 0  Last Office Visit: 12/4/2019  Future Office visit:       Routing refill request to provider for review/approval because:  Medication last prescribed 8/2019

## 2020-05-27 ENCOUNTER — MYC MEDICAL ADVICE (OUTPATIENT)
Dept: FAMILY MEDICINE | Facility: OTHER | Age: 45
End: 2020-05-27

## 2020-05-28 ENCOUNTER — VIRTUAL VISIT (OUTPATIENT)
Dept: FAMILY MEDICINE | Facility: OTHER | Age: 45
End: 2020-05-28
Attending: NURSE PRACTITIONER
Payer: COMMERCIAL

## 2020-05-28 VITALS — WEIGHT: 155 LBS | BODY MASS INDEX: 28.35 KG/M2

## 2020-05-28 DIAGNOSIS — K04.7 DENTAL INFECTION: Primary | ICD-10-CM

## 2020-05-28 PROCEDURE — 99213 OFFICE O/P EST LOW 20 MIN: CPT | Mod: 95 | Performed by: NURSE PRACTITIONER

## 2020-05-28 RX ORDER — PENICILLIN V POTASSIUM 500 MG/1
500 TABLET, FILM COATED ORAL 3 TIMES DAILY
Qty: 30 TABLET | Refills: 0 | Status: SHIPPED | OUTPATIENT
Start: 2020-05-28 | End: 2020-06-07

## 2020-05-28 ASSESSMENT — PAIN SCALES - GENERAL: PAINLEVEL: MODERATE PAIN (4)

## 2020-05-28 NOTE — PATIENT INSTRUCTIONS
Assessment/Plan:  1. Dental infection  Will attempt to return to dentist as they re-open.    - penicillin V (VEETID) 500 MG tablet; Take 1 tablet (500 mg) by mouth 3 times daily for 10 days  Dispense: 30 tablet; Refill: 0  - tylenol/ibuprofen as needed for pain.     Return if symptoms worsen or fail to improve.      Phone call duration:  6 minutes    Maria Del Rosario Alvarez,   Certified Adult Nurse Practitioner  [phone]

## 2020-05-28 NOTE — NURSING NOTE
"Chief Complaint   Patient presents with     Dental Problem       Initial Wt 70.3 kg (155 lb)   BMI 28.35 kg/m   Estimated body mass index is 28.35 kg/m  as calculated from the following:    Height as of 1/28/20: 1.575 m (5' 2\").    Weight as of this encounter: 70.3 kg (155 lb).  Medication Reconciliation: complete  Joyce Nixon LPN    "

## 2020-05-28 NOTE — PROGRESS NOTES
"Gita Jones is a 44 year old female who is being evaluated via a billable telephone visit.      The patient has been notified of following:     \"This telephone visit will be conducted via a call between you and your physician/provider. We have found that certain health care needs can be provided without the need for a physical exam.  This service lets us provide the care you need with a short phone conversation.  If a prescription is necessary we can send it directly to your pharmacy.  If lab work is needed we can place an order for that and you can then stop by our lab to have the test done at a later time.    Telephone visits are billed at different rates depending on your insurance coverage. During this emergency period, for some insurers they may be billed the same as an in-person visit.  Please reach out to your insurance provider with any questions.    If during the course of the call the physician/provider feels a telephone visit is not appropriate, you will not be charged for this service.\"    Patient has given verbal consent for Telephone visit?  Yes    What phone number would you like to be contacted at? [phone]    How would you like to obtain your AVS? Dulce Maria    Subjective     Gita Jones is a 44 year old female who presents via phone visit today for the following health issues:    HPI  Dental      Duration: off and on 6 months    Description (location/character/radiation): upper left side    Intensity:  4/10    Accompanying signs and symptoms: ear pain, dizziness    History (similar episodes/previous evaluation): None    Precipitating or alleviating factors: None    Therapies tried and outcome: cleocin, tylenol, ibuprofen, dramamine for dizziness.  She did have an appt with oral surgeon - was set up but then cancelled due to covid restrictions.            Patient Active Problem List   Diagnosis     Hypothyroidism     History of pelvic mass     Constipation     Social anxiety disorder "     Migraine     ACP (advance care planning)     Migraine without aura and without status migrainosus, not intractable     Blood glucose elevated     Exertional hypertension     Past Surgical History:   Procedure Laterality Date     APPENDECTOMY        SECTION  2007      SECTION       HERNIORRHAPHY UMBILICAL  2013    Procedure: HERNIORRHAPHY UMBILICAL;  OPEN SUPRA UMBILICAL HERNIA REPAIR WITH MESH;  Surgeon: Shante Goss DO;  Location: [facility]     lymph node bx neck-benign         Social History     Tobacco Use     Smoking status: Former Smoker     Packs/day: 2.00     Years: 10.00     Pack years: 20.00     Types: Cigarettes     Last attempt to quit: 2000     Years since quittin.4     Smokeless tobacco: Never Used     Tobacco comment: quit in . no passive exposure.   Substance Use Topics     Alcohol use: No     Family History   Problem Relation Age of Onset     Cancer Mother 54        cause of death     Diabetes Father      Lipids Father         hyperlipidemia     Hypertension Father      Cerebrovascular Disease Father      Neurologic Disorder Son         seizures         Current Outpatient Medications   Medication Sig Dispense Refill     aspirin (ASA) 81 MG tablet Take 81 mg by mouth       cyclobenzaprine (FLEXERIL) 5 MG tablet Take 1 tablet by mouth 3 times daily as needed  0     HYDROCHLOROTHIAZIDE 25 MG PO tablet TAKE 1 TABLET(25 MG) BY MOUTH DAILY 30 tablet 0     levothyroxine (SYNTHROID/LEVOTHROID) 150 MCG tablet Take 1 tablet (150 mcg) by mouth daily 90 tablet 3     metoprolol succinate ER (TOPROL-XL) 25 MG 24 hr tablet TAKE 1 TABLET(25 MG) BY MOUTH DAILY 30 tablet 0     multivitamin, therapeutic with minerals (THERA-VIT-M) TABS Take 1 tablet by mouth daily.       pantoprazole (PROTONIX) 40 MG EC tablet TAKE 1 TABLET(40 MG) BY MOUTH DAILY 90 tablet 0     penicillin V (VEETID) 500 MG tablet Take 1 tablet (500 mg) by mouth 3 times daily for 10 days 30 tablet 0      SUMAtriptan (IMITREX) 25 MG tablet TAKE 1 TABLET(25 MG) BY MOUTH AT ONSET OF HEADACHE FOR MIGRAINE 30 tablet 0     blood glucose (ONETOUCH VERIO IQ) test strip Use to test blood sugar 4 times daily. 100 each 0     blood glucose monitoring (ONE TOUCH DELICA) lancets Use to test blood sugar 4 times daily. 100 each 0     blood glucose monitoring (ONETOUCH VERIO IQ SYSTEM) meter device kit USE TO TEST BLOOD SUGARS FOUR TIMES DAILY 1 kit 0     Allergies   Allergen Reactions     Dye [Contrast Dye] Rash     IV dye, iodine containing contrast media     Nickel Rash     Recent Labs   Lab Test 12/04/19  1644 07/18/19  0922  03/06/19  1153  06/19/18  1113  05/26/17  1051   A1C 5.8* 5.5  --   --   --  5.4   < >  --    *  --   --   --   --  86  --  94   HDL 53  --   --   --   --  42*  --  46*   TRIG 223*  --   --   --   --  110  --  157*   ALT 34 20  --  31   < >  --   --   --    CR 0.95 0.85  --  0.80   < >  --    < >  --    GFRESTIMATED 73 83  --  89   < >  --   --   --    GFRESTBLACK 84 >90  --  >90  --   --   --   --    POTASSIUM 3.9 4.2  --  3.7   < >  --    < >  --    TSH 63.41* 3.71   < >  --    < > 0.11*  --  0.02*    < > = values in this interval not displayed.      BP Readings from Last 3 Encounters:   01/28/20 112/74   12/04/19 (!) 144/76   10/01/19 140/79    Wt Readings from Last 3 Encounters:   05/28/20 70.3 kg (155 lb)   01/28/20 73.5 kg (162 lb)   12/04/19 78.4 kg (172 lb 14.4 oz)                    Reviewed and updated as needed this visit by Provider  Allergies         Review of Systems   Constitutional, HEENT, cardiovascular, pulmonary, gi and gu systems are negative, except as otherwise noted.       Objective   Reported vitals:  Wt 70.3 kg (155 lb)   BMI 28.35 kg/m     alert and no distress  PSYCH: Alert and oriented times 3; coherent speech, normal   rate and volume, able to articulate logical thoughts, able   to abstract reason, no tangential thoughts, no hallucinations   or delusions  Her affect is  normal and pleasant  RESP: No cough, no audible wheezing, able to talk in full sentences  Remainder of exam unable to be completed due to telephone visits            Assessment/Plan:  1. Dental infection  Will attempt to return to dentist as they re-open.    - penicillin V (VEETID) 500 MG tablet; Take 1 tablet (500 mg) by mouth 3 times daily for 10 days  Dispense: 30 tablet; Refill: 0  - tylenol/ibuprofen as needed for pain.     Return if symptoms worsen or fail to improve.      Phone call duration:  6 minutes    Maria Del Rosario Alvarez,   Certified Adult Nurse Practitioner  [phone]

## 2020-06-22 ENCOUNTER — MYC MEDICAL ADVICE (OUTPATIENT)
Dept: FAMILY MEDICINE | Facility: OTHER | Age: 45
End: 2020-06-22

## 2020-09-17 DIAGNOSIS — I10 EXERTIONAL HYPERTENSION: ICD-10-CM

## 2020-09-18 RX ORDER — METOPROLOL SUCCINATE 25 MG/1
TABLET, EXTENDED RELEASE ORAL
Qty: 30 TABLET | Refills: 0 | Status: SHIPPED | OUTPATIENT
Start: 2020-09-18 | End: 2020-10-27

## 2020-09-18 NOTE — TELEPHONE ENCOUNTER
Metoprolol       Last Written Prescription Date:  4/24/2020  Last Fill Quantity: 30,   # refills: 0  Last Office Visit: 5/28/2020  Future Office visit:

## 2020-09-28 DIAGNOSIS — G43.009 MIGRAINE WITHOUT AURA AND WITHOUT STATUS MIGRAINOSUS, NOT INTRACTABLE: ICD-10-CM

## 2020-09-29 NOTE — TELEPHONE ENCOUNTER
Imitrex        Last Written Prescription Date:  9/12/19  Last Fill Quantity: 30,   # refills: 0  Last Office Visit: 5/28/20  Future Office visit:       Routing refill request to provider for review/approval because:  Drug not on the [facility], [facility] or [facility] refill protocol or controlled substance

## 2020-09-30 RX ORDER — SUMATRIPTAN 25 MG/1
TABLET, FILM COATED ORAL
Qty: 30 TABLET | Refills: 0 | Status: SHIPPED | OUTPATIENT
Start: 2020-09-30 | End: 2021-03-12

## 2020-10-24 DIAGNOSIS — I10 EXERTIONAL HYPERTENSION: ICD-10-CM

## 2020-10-27 RX ORDER — METOPROLOL SUCCINATE 25 MG/1
TABLET, EXTENDED RELEASE ORAL
Qty: 90 TABLET | Refills: 1 | Status: SHIPPED | OUTPATIENT
Start: 2020-10-27 | End: 2021-03-12 | Stop reason: ALTCHOICE

## 2020-11-25 ENCOUNTER — MYC REFILL (OUTPATIENT)
Dept: FAMILY MEDICINE | Facility: OTHER | Age: 45
End: 2020-11-25

## 2020-11-25 DIAGNOSIS — E03.9 ACQUIRED HYPOTHYROIDISM: ICD-10-CM

## 2020-11-25 RX ORDER — LEVOTHYROXINE SODIUM 150 UG/1
150 TABLET ORAL DAILY
Qty: 90 TABLET | Refills: 0 | Status: SHIPPED | OUTPATIENT
Start: 2020-11-25 | End: 2020-11-27

## 2020-11-25 RX ORDER — LEVOTHYROXINE SODIUM 150 UG/1
TABLET ORAL
Qty: 90 TABLET | Refills: 3 | OUTPATIENT
Start: 2020-11-25

## 2020-11-25 NOTE — LETTER
To:  Gita GUPTA Robertjessicaalyce  [address] [place] [id number]      [phone]      December 1, 2020      Dear MsTosin Florgrant:    I just refilled medications for you and had a chance to look at your chart.   Time goes fast, and a clinic visit to look at your medical issues and medications is overdue.    Please make a clinic  appointment  in the near future and bring all your pill bottles with you.  If you have already made an appointment then I apologize for overlooking that fact, and please disregard this letter.    I am looking forward to seeing you soon.    Sincerely,       Akash Davis NP

## 2020-11-25 NOTE — TELEPHONE ENCOUNTER
Levothyroxine 150 mcg  Last Visit 5/28/20  Last Fill 12/04/19  Next Visit None scheduled    Thank you

## 2020-11-27 RX ORDER — LEVOTHYROXINE SODIUM 150 UG/1
150 TABLET ORAL DAILY
Qty: 90 TABLET | Refills: 0 | Status: SHIPPED | OUTPATIENT
Start: 2020-11-27 | End: 2021-03-12

## 2021-01-19 NOTE — TELEPHONE ENCOUNTER
Patient left voice message stating she has not received a call from hospital on a Flow Monitor for checking blood sugar.  Please call [phone]   Affect and characteristics of appearance, verbalizations, behaviors are appropriate

## 2021-03-09 NOTE — TELEPHONE ENCOUNTER
Detail Level: Simple
Levothyroxine 150 mcg   Last Written Prescription Date:  11/27/2020  Last Fill Quantity: 90 tablets,   # refills: 0  Last Office Visit: 5/28/2020  Future Office visit:         
Add 05688 Cpt? (Important Note: In 2017 The Use Of 77420 Is Being Tracked By Cms To Determine Future Global Period Reimbursement For Global Periods): yes

## 2021-03-11 ENCOUNTER — APPOINTMENT (OUTPATIENT)
Dept: LAB | Facility: OTHER | Age: 46
End: 2021-03-11
Attending: NURSE PRACTITIONER
Payer: COMMERCIAL

## 2021-03-11 DIAGNOSIS — I10 EXERTIONAL HYPERTENSION: Primary | ICD-10-CM

## 2021-03-11 DIAGNOSIS — R73.01 ELEVATED FASTING GLUCOSE: ICD-10-CM

## 2021-03-11 DIAGNOSIS — E03.9 ACQUIRED HYPOTHYROIDISM: ICD-10-CM

## 2021-03-11 PROCEDURE — 80061 LIPID PANEL: CPT | Mod: ZL | Performed by: NURSE PRACTITIONER

## 2021-03-11 PROCEDURE — 999N001182 HC STATISTIC ESTIMATED AVERAGE GLUCOSE: Mod: ZL | Performed by: NURSE PRACTITIONER

## 2021-03-11 PROCEDURE — 84443 ASSAY THYROID STIM HORMONE: CPT | Mod: ZL | Performed by: NURSE PRACTITIONER

## 2021-03-11 PROCEDURE — 84439 ASSAY OF FREE THYROXINE: CPT | Mod: ZL | Performed by: NURSE PRACTITIONER

## 2021-03-11 PROCEDURE — 80053 COMPREHEN METABOLIC PANEL: CPT | Mod: ZL | Performed by: NURSE PRACTITIONER

## 2021-03-11 PROCEDURE — 36415 COLL VENOUS BLD VENIPUNCTURE: CPT | Mod: ZL | Performed by: NURSE PRACTITIONER

## 2021-03-11 PROCEDURE — 83036 HEMOGLOBIN GLYCOSYLATED A1C: CPT | Mod: ZL | Performed by: NURSE PRACTITIONER

## 2021-03-12 ENCOUNTER — OFFICE VISIT (OUTPATIENT)
Dept: FAMILY MEDICINE | Facility: OTHER | Age: 46
End: 2021-03-12
Attending: NURSE PRACTITIONER
Payer: COMMERCIAL

## 2021-03-12 VITALS
DIASTOLIC BLOOD PRESSURE: 72 MMHG | RESPIRATION RATE: 16 BRPM | TEMPERATURE: 97.2 F | OXYGEN SATURATION: 99 % | HEIGHT: 62 IN | HEART RATE: 79 BPM | BODY MASS INDEX: 30.55 KG/M2 | SYSTOLIC BLOOD PRESSURE: 134 MMHG | WEIGHT: 166 LBS

## 2021-03-12 DIAGNOSIS — Z80.8 FAMILY HISTORY OF SQUAMOUS CELL CARCINOMA OF SKIN: ICD-10-CM

## 2021-03-12 DIAGNOSIS — Z12.83 SKIN EXAM, SCREENING FOR CANCER: ICD-10-CM

## 2021-03-12 DIAGNOSIS — Z23 NEED FOR VACCINATION: ICD-10-CM

## 2021-03-12 DIAGNOSIS — G43.009 MIGRAINE WITHOUT AURA AND WITHOUT STATUS MIGRAINOSUS, NOT INTRACTABLE: Primary | ICD-10-CM

## 2021-03-12 DIAGNOSIS — K21.9 GASTROESOPHAGEAL REFLUX DISEASE, UNSPECIFIED WHETHER ESOPHAGITIS PRESENT: ICD-10-CM

## 2021-03-12 DIAGNOSIS — Z12.31 ENCOUNTER FOR SCREENING MAMMOGRAM FOR BREAST CANCER: ICD-10-CM

## 2021-03-12 DIAGNOSIS — R42 DIZZINESS: ICD-10-CM

## 2021-03-12 DIAGNOSIS — I10 EXERTIONAL HYPERTENSION: ICD-10-CM

## 2021-03-12 DIAGNOSIS — E03.9 ACQUIRED HYPOTHYROIDISM: ICD-10-CM

## 2021-03-12 DIAGNOSIS — R73.01 ELEVATED FASTING GLUCOSE: ICD-10-CM

## 2021-03-12 LAB
ALBUMIN SERPL-MCNC: 3.8 G/DL (ref 3.4–5)
ALP SERPL-CCNC: 89 U/L (ref 40–150)
ALT SERPL W P-5'-P-CCNC: 28 U/L (ref 0–50)
ANION GAP SERPL CALCULATED.3IONS-SCNC: 7 MMOL/L (ref 3–14)
AST SERPL W P-5'-P-CCNC: 17 U/L (ref 0–45)
BILIRUB SERPL-MCNC: 0.5 MG/DL (ref 0.2–1.3)
BUN SERPL-MCNC: 7 MG/DL (ref 7–30)
CALCIUM SERPL-MCNC: 8.4 MG/DL (ref 8.5–10.1)
CHLORIDE SERPL-SCNC: 110 MMOL/L (ref 94–109)
CHOLEST SERPL-MCNC: 182 MG/DL
CO2 SERPL-SCNC: 23 MMOL/L (ref 20–32)
CREAT SERPL-MCNC: 0.81 MG/DL (ref 0.52–1.04)
EST. AVERAGE GLUCOSE BLD GHB EST-MCNC: 117 MG/DL
GFR SERPL CREATININE-BSD FRML MDRD: 88 ML/MIN/{1.73_M2}
GLUCOSE SERPL-MCNC: 101 MG/DL (ref 70–99)
HBA1C MFR BLD: 5.7 % (ref 0–5.6)
HDLC SERPL-MCNC: 45 MG/DL
LDLC SERPL CALC-MCNC: 114 MG/DL
NONHDLC SERPL-MCNC: 137 MG/DL
POTASSIUM SERPL-SCNC: 3.8 MMOL/L (ref 3.4–5.3)
PROT SERPL-MCNC: 7.3 G/DL (ref 6.8–8.8)
SODIUM SERPL-SCNC: 140 MMOL/L (ref 133–144)
T4 FREE SERPL-MCNC: 1.14 NG/DL (ref 0.76–1.46)
TRIGL SERPL-MCNC: 117 MG/DL
TSH SERPL DL<=0.005 MIU/L-ACNC: 0.12 MU/L (ref 0.4–4)

## 2021-03-12 PROCEDURE — G0463 HOSPITAL OUTPT CLINIC VISIT: HCPCS

## 2021-03-12 PROCEDURE — 90715 TDAP VACCINE 7 YRS/> IM: CPT

## 2021-03-12 PROCEDURE — G0463 HOSPITAL OUTPT CLINIC VISIT: HCPCS | Mod: 25

## 2021-03-12 PROCEDURE — 99214 OFFICE O/P EST MOD 30 MIN: CPT | Performed by: NURSE PRACTITIONER

## 2021-03-12 RX ORDER — LEVOTHYROXINE SODIUM 150 UG/1
150 TABLET ORAL DAILY
Qty: 90 TABLET | Refills: 0 | Status: SHIPPED | OUTPATIENT
Start: 2021-03-12 | End: 2021-05-20

## 2021-03-12 RX ORDER — SUMATRIPTAN 25 MG/1
25 TABLET, FILM COATED ORAL
Qty: 30 TABLET | Refills: 0 | Status: SHIPPED | OUTPATIENT
Start: 2021-03-12 | End: 2021-04-08

## 2021-03-12 RX ORDER — LOSARTAN POTASSIUM 25 MG/1
25 TABLET ORAL DAILY
Qty: 90 TABLET | Refills: 1 | Status: SHIPPED | OUTPATIENT
Start: 2021-03-12 | End: 2021-07-22

## 2021-03-12 ASSESSMENT — ANXIETY QUESTIONNAIRES
1. FEELING NERVOUS, ANXIOUS, OR ON EDGE: NOT AT ALL
7. FEELING AFRAID AS IF SOMETHING AWFUL MIGHT HAPPEN: SEVERAL DAYS
4. TROUBLE RELAXING: SEVERAL DAYS
5. BEING SO RESTLESS THAT IT IS HARD TO SIT STILL: NEARLY EVERY DAY
3. WORRYING TOO MUCH ABOUT DIFFERENT THINGS: SEVERAL DAYS
2. NOT BEING ABLE TO STOP OR CONTROL WORRYING: NOT AT ALL
6. BECOMING EASILY ANNOYED OR IRRITABLE: NOT AT ALL
GAD7 TOTAL SCORE: 6
IF YOU CHECKED OFF ANY PROBLEMS ON THIS QUESTIONNAIRE, HOW DIFFICULT HAVE THESE PROBLEMS MADE IT FOR YOU TO DO YOUR WORK, TAKE CARE OF THINGS AT HOME, OR GET ALONG WITH OTHER PEOPLE: SOMEWHAT DIFFICULT

## 2021-03-12 ASSESSMENT — MIFFLIN-ST. JEOR: SCORE: 1351.22

## 2021-03-12 ASSESSMENT — PAIN SCALES - GENERAL: PAINLEVEL: NO PAIN (0)

## 2021-03-12 ASSESSMENT — PATIENT HEALTH QUESTIONNAIRE - PHQ9: SUM OF ALL RESPONSES TO PHQ QUESTIONS 1-9: 3

## 2021-03-12 NOTE — NURSING NOTE
"Chief Complaint   Patient presents with     Chronic Disease Management       Initial /72 (BP Location: Right arm, Patient Position: Chair, Cuff Size: Adult Regular)   Pulse 79   Temp 97.2  F (36.2  C) (Tympanic)   Resp 16   Ht 1.575 m (5' 2\")   Wt 75.3 kg (166 lb)   SpO2 99%   BMI 30.36 kg/m   Estimated body mass index is 30.36 kg/m  as calculated from the following:    Height as of this encounter: 1.575 m (5' 2\").    Weight as of this encounter: 75.3 kg (166 lb).  Medication Reconciliation: complete  Diana Valle LPN    "

## 2021-03-12 NOTE — PROGRESS NOTES
"    1. Elevated fasting glucose  - Hemoglobin A1c    2. Acquired hypothyroidism  - TSH with free T4 reflex  - levothyroxine (SYNTHROID/LEVOTHROID) 150 MCG tablet; Take 1 tablet (150 mcg) by mouth daily  Dispense: 90 tablet; Refill: 0    3. Exertional hypertension  Stop metoprolol  - Comprehensive metabolic panel  - Lipid Profile  - losartan (COZAAR) 25 MG tablet; Take 1 tablet (25 mg) by mouth daily  Dispense: 90 tablet; Refill: 1    4. Migraine without aura and without status migrainosus, not intractable  - SUMAtriptan (IMITREX) 25 MG tablet; Take 1 tablet (25 mg) by mouth at onset of headache for migraine  Dispense: 30 tablet; Refill: 0    5. Gastroesophageal reflux disease, unspecified whether esophagitis present  - esomeprazole (NEXIUM) 20 MG DR capsule; Take 1 capsule (20 mg) by mouth every morning (before breakfast) Take 30-60 minutes before eating.  Dispense: 90 capsule; Refill: 1    6. Encounter for screening mammogram for breast cancer  - MA Screen Bilateral w/Quang; Future    7. Skin exam, screening for cancer  - DERMATOLOGY ADULT REFERRAL; Future    8. Family history of squamous cell carcinoma of skin  [facility]   - DERMATOLOGY ADULT REFERRAL; Future    9. Need for vaccination  adacel updated today.     10. Dizziness  - US Carotid Bilateral; Future    Labs drawn yesterday, all are pending.     Follow-up in 3 months or as needed for acute concerns    Maria Del Rosario Alvarez,   Certified Adult Nurse Practitioner  [phone]        Michaela Pelayo is a 45 year old who presents for the following health issues     HPI       Hypertension Follow-up      Do you check your blood pressure regularly outside of the clinic? Yes     Are you following a low salt diet? Yes    Are your blood pressures ever more than 140 on the top number (systolic) OR more   than 90 on the bottom number (diastolic), for example 140/90? Yes   She stopped metoprolol due to \"kidney pain\" which has now resolved  She experience " "intermittent dizziness with \"pain of left neck\" near carotid area.      Anxiety Follow-Up    How are you doing with your anxiety since your last visit? No change    Are you having other symptoms that might be associated with anxiety? No    Have you had a significant life event? No     Are you feeling depressed? No    Do you have any concerns with your use of alcohol or other drugs? No    Social History     Tobacco Use     Smoking status: Former Smoker     Packs/day: 2.00     Years: 10.00     Pack years: 20.00     Types: Cigarettes     Quit date: 2000     Years since quittin.2     Smokeless tobacco: Never Used     Tobacco comment: quit in . no passive exposure.   Substance Use Topics     Alcohol use: No     Drug use: No     NICOLE-7 SCORE 2019 2019 3/12/2021   Total Score 6 2 6     PHQ 2019 2019 3/12/2021   PHQ-9 Total Score 5 3 3   Q9: Thoughts of better off dead/self-harm past 2 weeks Not at all Not at all Not at all     Last PHQ-9 3/12/2021   1.  Little interest or pleasure in doing things 0   2.  Feeling down, depressed, or hopeless 0   3.  Trouble falling or staying asleep, or sleeping too much 3   4.  Feeling tired or having little energy 0   5.  Poor appetite or overeating 0   6.  Feeling bad about yourself 0   7.  Trouble concentrating 0   8.  Moving slowly or restless 0   Q9: Thoughts of better off dead/self-harm past 2 weeks 0   PHQ-9 Total Score 3   Difficulty at work, home, or with people Not difficult at all     NICOLE-7  3/12/2021   1. Feeling nervous, anxious, or on edge 0   2. Not being able to stop or control worrying 0   3. Worrying too much about different things 1   4. Trouble relaxing 1   5. Being so restless that it is hard to sit still 3   6. Becoming easily annoyed or irritable 0   7. Feeling afraid, as if something awful might happen 1   NICOLE-7 Total Score 6   If you checked any problems, how difficult have they made it for you to do your work, take care of things at " home, or get along with other people? Somewhat difficult       Migraine     Since your last clinic visit, how have your headaches changed?  No change    How often are you getting headaches or migraines? 1 since last visit     Are you able to do normal daily activities when you have a migraine? No    Are you taking rescue/relief medications? (Select all that apply) sumatriptan (Imitrex)    How helpful is your rescue/relief medication?  I get some relief    Are you taking any medications to prevent migraines? (Select all that apply)  No    In the past 4 weeks, how often have you gone to urgent care or the emergency room because of your headaches?  0    Hypothyroidism Follow-up      Since last visit, patient describes the following symptoms: Weight stable, no hair loss, no skin changes, no constipation, no loose stools      How many servings of fruits and vegetables do you eat daily?  4 or more    On average, how many sweetened beverages do you drink each day (Examples: soda, juice, sweet tea, etc.  Do NOT count diet or artificially sweetened beverages)?   1    How many days per week do you exercise enough to make your heart beat faster? 4    How many minutes a day do you exercise enough to make your heart beat faster? 20 - 29    How many days per week do you miss taking your medication? 0        Patient Active Problem List   Diagnosis     Hypothyroidism     History of pelvic mass     Constipation     Social anxiety disorder     Migraine     ACP (advance care planning)     Migraine without aura and without status migrainosus, not intractable     Blood glucose elevated     Exertional hypertension     Past Surgical History:   Procedure Laterality Date     APPENDECTOMY        SECTION        SECTION       HERNIORRHAPHY UMBILICAL  2013    Procedure: HERNIORRHAPHY UMBILICAL;  OPEN SUPRA UMBILICAL HERNIA REPAIR WITH MESH;  Surgeon: Shante Goss DO;  Location: [facility]     lymph node bx  neck-benign         Social History     Tobacco Use     Smoking status: Former Smoker     Packs/day: 2.00     Years: 10.00     Pack years: 20.00     Types: Cigarettes     Quit date: 2000     Years since quittin.2     Smokeless tobacco: Never Used     Tobacco comment: quit in . no passive exposure.   Substance Use Topics     Alcohol use: No     Family History   Problem Relation Age of Onset     Cancer Mother 54        cause of death     Diabetes Father      Lipids Father         hyperlipidemia     Hypertension Father      Cerebrovascular Disease Father      Neurologic Disorder Son         seizures         Current Outpatient Medications   Medication Sig Dispense Refill     aspirin (ASA) 81 MG tablet Take 81 mg by mouth       blood glucose (ONETOUCH VERIO IQ) test strip Use to test blood sugar 4 times daily. 100 each 0     blood glucose monitoring (ONE TOUCH DELICA) lancets Use to test blood sugar 4 times daily. 100 each 0     blood glucose monitoring (ONETOUCH VERIO IQ SYSTEM) meter device kit USE TO TEST BLOOD SUGARS FOUR TIMES DAILY 1 kit 0     cyclobenzaprine (FLEXERIL) 5 MG tablet Take 1 tablet by mouth 3 times daily as needed  0     HYDROCHLOROTHIAZIDE 25 MG PO tablet TAKE 1 TABLET(25 MG) BY MOUTH DAILY 30 tablet 0     levothyroxine (SYNTHROID/LEVOTHROID) 150 MCG tablet Take 1 tablet (150 mcg) by mouth daily 90 tablet 0     metoprolol succinate ER (TOPROL-XL) 25 MG 24 hr tablet TAKE 1 TABLET BY MOUTH EVERY DAY 90 tablet 1     multivitamin, therapeutic with minerals (THERA-VIT-M) TABS Take 1 tablet by mouth daily.       pantoprazole (PROTONIX) 40 MG EC tablet TAKE 1 TABLET(40 MG) BY MOUTH DAILY 90 tablet 0     SUMAtriptan (IMITREX) 25 MG tablet TAKE 1 TABLET(25 MG) BY MOUTH AT ONSET OF HEADACHE FOR MIGRAINE 30 tablet 0     Allergies   Allergen Reactions     Dye [Contrast Dye] Rash     IV dye, iodine containing contrast media     Nickel Rash     Recent Labs   Lab Test 19  1644 19  3003  "03/06/19  1153 03/06/19  1153 06/19/18  1113 06/19/18  1113 05/26/17  1051 05/26/17  1051   A1C 5.8* 5.5  --   --   --  5.4   < >  --    *  --   --   --   --  86  --  94   HDL 53  --   --   --   --  42*  --  46*   TRIG 223*  --   --   --   --  110  --  157*   ALT 34 20  --  31   < >  --   --   --    CR 0.95 0.85  --  0.80   < >  --    < >  --    GFRESTIMATED 73 83  --  89   < >  --   --   --    GFRESTBLACK 84 >90  --  >90  --   --   --   --    POTASSIUM 3.9 4.2  --  3.7   < >  --    < >  --    TSH 63.41* 3.71   < >  --    < > 0.11*  --  0.02*    < > = values in this interval not displayed.      BP Readings from Last 3 Encounters:   03/12/21 134/72   01/28/20 112/74   12/04/19 (!) 144/76    Wt Readings from Last 3 Encounters:   03/12/21 75.3 kg (166 lb)   05/28/20 70.3 kg (155 lb)   01/28/20 73.5 kg (162 lb)                      Review of Systems   Constitutional, HEENT, cardiovascular, pulmonary, gi and gu systems are negative, except as otherwise noted.      Objective    /72 (BP Location: Right arm, Patient Position: Chair, Cuff Size: Adult Regular)   Pulse 79   Temp 97.2  F (36.2  C) (Tympanic)   Resp 16   Ht 1.575 m (5' 2\")   Wt 75.3 kg (166 lb)   SpO2 99%   BMI 30.36 kg/m    Body mass index is 30.36 kg/m .  Physical Exam   GENERAL: healthy, alert and no distress  NECK: no adenopathy, no asymmetry, masses, or scars, thyroid normal to palpation and no carotid bruits  RESP: lungs clear to auscultation - no rales, rhonchi or wheezes  CV: regular rate and rhythm, normal S1 S2, no S3 or S4, no murmur, click or rub, no peripheral edema and peripheral pulses strong  MS: no gross musculoskeletal defects noted, no edema  PSYCH: mentation appears normal, affect normal/bright                "

## 2021-03-12 NOTE — PATIENT INSTRUCTIONS
1. Elevated fasting glucose  - Hemoglobin A1c    2. Acquired hypothyroidism  - TSH with free T4 reflex  - levothyroxine (SYNTHROID/LEVOTHROID) 150 MCG tablet; Take 1 tablet (150 mcg) by mouth daily  Dispense: 90 tablet; Refill: 0    3. Exertional hypertension  Stop metoprolol  - Comprehensive metabolic panel  - Lipid Profile  - losartan (COZAAR) 25 MG tablet; Take 1 tablet (25 mg) by mouth daily  Dispense: 90 tablet; Refill: 1    4. Migraine without aura and without status migrainosus, not intractable  - SUMAtriptan (IMITREX) 25 MG tablet; Take 1 tablet (25 mg) by mouth at onset of headache for migraine  Dispense: 30 tablet; Refill: 0    5. Gastroesophageal reflux disease, unspecified whether esophagitis present  - esomeprazole (NEXIUM) 20 MG DR capsule; Take 1 capsule (20 mg) by mouth every morning (before breakfast) Take 30-60 minutes before eating.  Dispense: 90 capsule; Refill: 1    6. Encounter for screening mammogram for breast cancer  - MA Screen Bilateral w/Quang; Future    7. Skin exam, screening for cancer  - DERMATOLOGY ADULT REFERRAL; Future    8. Family history of squamous cell carcinoma of skin  [facility]   - DERMATOLOGY ADULT REFERRAL; Future    9. Need for vaccination  adacel updated today.     Follow-up in 3 months or as needed for acute concerns    Maria Del Rosario Alvarez,   Certified Adult Nurse Practitioner  [phone]

## 2021-03-13 ASSESSMENT — ANXIETY QUESTIONNAIRES: GAD7 TOTAL SCORE: 6

## 2021-03-25 ENCOUNTER — HOSPITAL ENCOUNTER (OUTPATIENT)
Dept: ULTRASOUND IMAGING | Facility: HOSPITAL | Age: 46
End: 2021-03-25
Attending: NURSE PRACTITIONER
Payer: COMMERCIAL

## 2021-03-25 ENCOUNTER — ANCILLARY PROCEDURE (OUTPATIENT)
Dept: MAMMOGRAPHY | Facility: OTHER | Age: 46
End: 2021-03-25
Attending: NURSE PRACTITIONER
Payer: COMMERCIAL

## 2021-03-25 DIAGNOSIS — Z12.31 ENCOUNTER FOR SCREENING MAMMOGRAM FOR BREAST CANCER: ICD-10-CM

## 2021-03-25 DIAGNOSIS — R42 DIZZINESS: ICD-10-CM

## 2021-03-25 DIAGNOSIS — N64.4 PAIN OF LEFT BREAST: ICD-10-CM

## 2021-03-25 PROCEDURE — G0279 TOMOSYNTHESIS, MAMMO: HCPCS | Mod: TC

## 2021-03-25 PROCEDURE — 76642 ULTRASOUND BREAST LIMITED: CPT | Mod: LT

## 2021-03-25 PROCEDURE — 93880 EXTRACRANIAL BILAT STUDY: CPT

## 2021-04-08 DIAGNOSIS — G43.009 MIGRAINE WITHOUT AURA AND WITHOUT STATUS MIGRAINOSUS, NOT INTRACTABLE: ICD-10-CM

## 2021-04-08 RX ORDER — SUMATRIPTAN 25 MG/1
TABLET, FILM COATED ORAL
Qty: 30 TABLET | Refills: 0 | Status: SHIPPED | OUTPATIENT
Start: 2021-04-08 | End: 2023-04-27 | Stop reason: ALTCHOICE

## 2021-05-20 DIAGNOSIS — E03.9 ACQUIRED HYPOTHYROIDISM: ICD-10-CM

## 2021-05-20 RX ORDER — LEVOTHYROXINE SODIUM 150 UG/1
TABLET ORAL
Qty: 90 TABLET | Refills: 0 | Status: SHIPPED | OUTPATIENT
Start: 2021-05-20 | End: 2021-08-13

## 2021-05-20 NOTE — TELEPHONE ENCOUNTER
Levothyroxine  Last Written Prescription Date: 3/12/21  Last Fill Quantity: 90 # of Refills: 0  Last Office Visit: 3/12/21

## 2021-07-02 ENCOUNTER — TRANSFERRED RECORDS (OUTPATIENT)
Dept: HEALTH INFORMATION MANAGEMENT | Facility: CLINIC | Age: 46
End: 2021-07-02

## 2021-07-22 ENCOUNTER — OFFICE VISIT (OUTPATIENT)
Dept: FAMILY MEDICINE | Facility: OTHER | Age: 46
End: 2021-07-22
Attending: PHYSICIAN ASSISTANT
Payer: COMMERCIAL

## 2021-07-22 VITALS
BODY MASS INDEX: 28.1 KG/M2 | HEART RATE: 78 BPM | TEMPERATURE: 98.2 F | WEIGHT: 158.6 LBS | SYSTOLIC BLOOD PRESSURE: 106 MMHG | OXYGEN SATURATION: 100 % | HEIGHT: 63 IN | DIASTOLIC BLOOD PRESSURE: 68 MMHG | RESPIRATION RATE: 18 BRPM

## 2021-07-22 DIAGNOSIS — R73.01 ELEVATED FASTING GLUCOSE: ICD-10-CM

## 2021-07-22 DIAGNOSIS — Z00.00 ROUTINE GENERAL MEDICAL EXAMINATION AT A HEALTH CARE FACILITY: Primary | ICD-10-CM

## 2021-07-22 LAB
EST. AVERAGE GLUCOSE BLD GHB EST-MCNC: 105 MG/DL
FASTING STATUS PATIENT QL REPORTED: NO
GLUCOSE BLD-MCNC: 99 MG/DL (ref 70–99)
HBA1C MFR BLD: 5.3 % (ref 0–5.6)
T4 FREE SERPL-MCNC: 1.34 NG/DL (ref 0.76–1.46)
TSH SERPL DL<=0.005 MIU/L-ACNC: 0.05 MU/L (ref 0.4–4)

## 2021-07-22 PROCEDURE — 99396 PREV VISIT EST AGE 40-64: CPT | Performed by: PHYSICIAN ASSISTANT

## 2021-07-22 PROCEDURE — 84439 ASSAY OF FREE THYROXINE: CPT | Mod: ZL | Performed by: PHYSICIAN ASSISTANT

## 2021-07-22 PROCEDURE — 84443 ASSAY THYROID STIM HORMONE: CPT | Mod: ZL | Performed by: PHYSICIAN ASSISTANT

## 2021-07-22 PROCEDURE — 82947 ASSAY GLUCOSE BLOOD QUANT: CPT | Mod: ZL | Performed by: PHYSICIAN ASSISTANT

## 2021-07-22 PROCEDURE — 99212 OFFICE O/P EST SF 10 MIN: CPT | Mod: 25 | Performed by: PHYSICIAN ASSISTANT

## 2021-07-22 PROCEDURE — 83036 HEMOGLOBIN GLYCOSYLATED A1C: CPT | Mod: ZL | Performed by: PHYSICIAN ASSISTANT

## 2021-07-22 PROCEDURE — 36415 COLL VENOUS BLD VENIPUNCTURE: CPT | Mod: ZL | Performed by: PHYSICIAN ASSISTANT

## 2021-07-22 PROCEDURE — G0463 HOSPITAL OUTPT CLINIC VISIT: HCPCS

## 2021-07-22 RX ORDER — METOPROLOL SUCCINATE 25 MG/1
12.5 TABLET, EXTENDED RELEASE ORAL DAILY
COMMUNITY
End: 2022-02-09 | Stop reason: ALTCHOICE

## 2021-07-22 RX ORDER — GLUCOSAMINE HCL/CHONDROITIN SU 500-400 MG
CAPSULE ORAL
Qty: 100 EACH | Refills: 3 | Status: SHIPPED | OUTPATIENT
Start: 2021-07-22 | End: 2022-08-25

## 2021-07-22 RX ORDER — LANCETS
EACH MISCELLANEOUS
Qty: 100 EACH | Refills: 6 | Status: SHIPPED | OUTPATIENT
Start: 2021-07-22 | End: 2023-04-27 | Stop reason: ALTCHOICE

## 2021-07-22 ASSESSMENT — ANXIETY QUESTIONNAIRES
3. WORRYING TOO MUCH ABOUT DIFFERENT THINGS: NOT AT ALL
6. BECOMING EASILY ANNOYED OR IRRITABLE: NOT AT ALL
GAD7 TOTAL SCORE: 3
7. FEELING AFRAID AS IF SOMETHING AWFUL MIGHT HAPPEN: NOT AT ALL
4. TROUBLE RELAXING: NOT AT ALL
1. FEELING NERVOUS, ANXIOUS, OR ON EDGE: NOT AT ALL
IF YOU CHECKED OFF ANY PROBLEMS ON THIS QUESTIONNAIRE, HOW DIFFICULT HAVE THESE PROBLEMS MADE IT FOR YOU TO DO YOUR WORK, TAKE CARE OF THINGS AT HOME, OR GET ALONG WITH OTHER PEOPLE: SOMEWHAT DIFFICULT
2. NOT BEING ABLE TO STOP OR CONTROL WORRYING: NEARLY EVERY DAY
5. BEING SO RESTLESS THAT IT IS HARD TO SIT STILL: NOT AT ALL

## 2021-07-22 ASSESSMENT — PAIN SCALES - GENERAL: PAINLEVEL: NO PAIN (0)

## 2021-07-22 ASSESSMENT — PATIENT HEALTH QUESTIONNAIRE - PHQ9: SUM OF ALL RESPONSES TO PHQ QUESTIONS 1-9: 2

## 2021-07-22 ASSESSMENT — MIFFLIN-ST. JEOR: SCORE: 1327.14

## 2021-07-22 NOTE — PROGRESS NOTES
SUBJECTIVE:   CC: Gita Jones is an 45 year old woman who presents for preventive health visit.       Patient has been advised of split billing requirements and indicates understanding: Yes  Healthy Habits:    Do you get at least three servings of calcium containing foods daily (dairy, green leafy vegetables, etc.)? yes    Amount of exercise or daily activities, outside of work: 2 day(s) per week    Problems taking medications regularly No    Medication side effects: Yes - intermittent chest pain and abdominal RLQ pain, numbness and needles in hands     Have you had an eye exam in the past two years? no    Do you see a dentist twice per year? yes    Do you have sleep apnea, excessive snoring or daytime drowsiness?no      Diabetes Follow-up      How often are you checking your blood sugar? Not at all    What concerns do you have today about your diabetes? None     Do you have any of these symptoms? (Select all that apply)  Blurry vision      BP Readings from Last 2 Encounters:   07/22/21 106/68   03/12/21 134/72     Hemoglobin A1C (%)   Date Value   03/11/2021 5.7 (H)   12/04/2019 5.8 (H)     LDL Cholesterol Calculated (mg/dL)   Date Value   03/11/2021 114 (H)   12/04/2019 150 (H)            Hyperlipidemia Follow-Up      Are you regularly taking any medication or supplement to lower your cholesterol?   No    Are you having muscle aches or other side effects that you think could be caused by your cholesterol lowering medication?  No    Hypertension Follow-up      Do you check your blood pressure regularly outside of the clinic? No     Are you following a low salt diet? Yes    Are your blood pressures ever more than 140 on the top number (systolic) OR more   than 90 on the bottom number (diastolic), for example 140/90? n/a    Anxiety Follow-Up    How are you doing with your anxiety since your last visit? No change    Are you having other symptoms that might be associated with anxiety? No    Have you had a  significant life event? OTHER: Papo came to live with her - he is currently doing dugs (Unknown source)     Are you feeling depressed? No    Do you have any concerns with your use of alcohol or other drugs? No    Social History     Tobacco Use     Smoking status: Former Smoker     Packs/day: 2.00     Years: 10.00     Pack years: 20.00     Types: Cigarettes     Quit date: 2000     Years since quittin.5     Smokeless tobacco: Never Used     Tobacco comment: quit in . no passive exposure.   Substance Use Topics     Alcohol use: No     Drug use: No     NICOLE-7 SCORE 2019 3/12/2021 2021   Total Score 2 6 3     PHQ 2019 3/12/2021 2021   PHQ-9 Total Score 3 3 2   Q9: Thoughts of better off dead/self-harm past 2 weeks Not at all Not at all Not at all     Last PHQ-9 2021   1.  Little interest or pleasure in doing things 0   2.  Feeling down, depressed, or hopeless 0   3.  Trouble falling or staying asleep, or sleeping too much 2   4.  Feeling tired or having little energy 0   5.  Poor appetite or overeating 0   6.  Feeling bad about yourself 0   7.  Trouble concentrating 0   8.  Moving slowly or restless 0   Q9: Thoughts of better off dead/self-harm past 2 weeks 0   PHQ-9 Total Score 2   Difficulty at work, home, or with people Not difficult at all     NICOLE-7  2021   1. Feeling nervous, anxious, or on edge 0   2. Not being able to stop or control worrying 3   3. Worrying too much about different things 0   4. Trouble relaxing 0   5. Being so restless that it is hard to sit still 0   6. Becoming easily annoyed or irritable 0   7. Feeling afraid, as if something awful might happen 0   NICOLE-7 Total Score 3   If you checked any problems, how difficult have they made it for you to do your work, take care of things at home, or get along with other people? Somewhat difficult       Hypothyroidism Follow-up      Since last visit, patient describes the following symptoms: Weight stable, no  hair loss, no skin changes, no constipation, no loose stools, dry skin, constipation and anxiety    Face-to-Face   Blood glucose monitor      Duration: One Month    Description (location/character/radiation): Patients blood glucose monitor quit reading BTL     Intensity:  n/a    Accompanying signs and symptoms: n/a    History (similar episodes/previous evaluation): None    Precipitating or alleviating factors: None    Therapies tried and outcome: None                     Today's PHQ-2 Score:   PHQ-2 (  [employer]) 2021   Q1: Little interest or pleasure in doing things 0 0   Q2: Feeling down, depressed or hopeless 0 0   PHQ-2 Score 0 0       Abuse: Current or Past(Physical, Sexual or Emotional)- No  Do you feel safe in your environment? Yes    Have you ever done Advance Care Planning? (For example, a Health Directive, POLST, or a discussion with a medical provider or your loved ones about your wishes): No, advance care planning information given to patient to review.  Advanced care planning was discussed at today's visit.    Social History     Tobacco Use     Smoking status: Former Smoker     Packs/day: 2.00     Years: 10.00     Pack years: 20.00     Types: Cigarettes     Quit date: 2000     Years since quittin.5     Smokeless tobacco: Never Used     Tobacco comment: quit in . no passive exposure.   Substance Use Topics     Alcohol use: No     If you drink alcohol do you typically have >3 drinks per day or >7 drinks per week? No                     Reviewed orders with patient.  Reviewed health maintenance and updated orders accordingly - Yes  Lab work is in process  Labs reviewed in EPIC  BP Readings from Last 3 Encounters:   21 106/68   21 134/72   20 112/74    Wt Readings from Last 3 Encounters:   21 71.9 kg (158 lb 9.6 oz)   21 75.3 kg (166 lb)   20 70.3 kg (155 lb)                  Patient Active Problem List   Diagnosis     Hypothyroidism      History of pelvic mass     Constipation     Social anxiety disorder     Migraine     ACP (advance care planning)     Migraine without aura and without status migrainosus, not intractable     Blood glucose elevated     Exertional hypertension     Past Surgical History:   Procedure Laterality Date     APPENDECTOMY        SECTION  2007      SECTION       HERNIORRHAPHY UMBILICAL  2013    Procedure: HERNIORRHAPHY UMBILICAL;  OPEN SUPRA UMBILICAL HERNIA REPAIR WITH MESH;  Surgeon: Shante Goss DO;  Location: [facility]     lymph node bx neck-benign         Social History     Tobacco Use     Smoking status: Former Smoker     Packs/day: 2.00     Years: 10.00     Pack years: 20.00     Types: Cigarettes     Quit date: 2000     Years since quittin.5     Smokeless tobacco: Never Used     Tobacco comment: quit in . no passive exposure.   Substance Use Topics     Alcohol use: No     Family History   Problem Relation Age of Onset     Cancer Mother 54        cause of death     Diabetes Father      Lipids Father         hyperlipidemia     Hypertension Father      Cerebrovascular Disease Father      Neurologic Disorder Son         seizures         Current Outpatient Medications   Medication Sig Dispense Refill     aspirin (ASA) 81 MG tablet Take 81 mg by mouth       blood glucose (ONETOUCH VERIO IQ) test strip Use to test blood sugar 4 times daily. 100 each 0     blood glucose monitoring (ONE TOUCH DELICA) lancets Use to test blood sugar 4 times daily. 100 each 0     blood glucose monitoring (ONETOUCH VERIO IQ SYSTEM) meter device kit USE TO TEST BLOOD SUGARS FOUR TIMES DAILY 1 kit 0     cyclobenzaprine (FLEXERIL) 5 MG tablet Take 1 tablet by mouth 3 times daily as needed  0     esomeprazole (NEXIUM) 20 MG DR capsule Take 1 capsule (20 mg) by mouth every morning (before breakfast) Take 30-60 minutes before eating. 90 capsule 1     levothyroxine (SYNTHROID/LEVOTHROID) 150 MCG tablet TAKE 1  TABLET(150 MCG) BY MOUTH DAILY 90 tablet 0     metoprolol succinate ER (TOPROL-XL) 25 MG 24 hr tablet Take 12.5 mg by mouth daily       multivitamin, therapeutic with minerals (THERA-VIT-M) TABS Take 1 tablet by mouth daily.       SUMAtriptan (IMITREX) 25 MG tablet TAKE 1 TABLET(25 MG) BY MOUTH AT ONSET OF HEADACHE FOR MIGRAINE 30 tablet 0     Allergies   Allergen Reactions     Dye [Contrast Dye] Rash     IV dye, iodine containing contrast media     Nickel Rash     Recent Labs   Lab Test 03/11/21  1551 12/04/19  1644 07/18/19  0922 12/13/18  1617 06/19/18  1113   A1C 5.7* 5.8* 5.5  --  5.4   * 150*  --   --  86   HDL 45* 53  --   --  42*   TRIG 117 223*  --   --  110   ALT 28 34 20   < >  --    CR 0.81 0.95 0.85   < >  --    GFRESTIMATED 88 73 83   < >  --    GFRESTBLACK >90 84 >90   < >  --    POTASSIUM 3.8 3.9 4.2   < >  --    TSH 0.12* 63.41* 3.71  --  0.11*    < > = values in this interval not displayed.        FHS-7: No flowsheet data found.    Mammogram Screening: Recommended annual mammography  Pertinent mammograms are reviewed under the imaging tab.    Pertinent mammograms are reviewed under the imaging tab.  History of abnormal Pap smear:   Last 3 Pap and HPV Results:   PAP / HPV Latest Ref Rng & Units 6/9/2017 12/12/2014   PAP - NIL NIL   HPV 16 DNA NEG Negative -   HPV 18 DNA NEG Negative -   OTHER HR HPV NEG Negative -     PAP / HPV Latest Ref Rng & Units 6/9/2017 12/12/2014   PAP - NIL NIL   HPV 16 DNA NEG Negative -   HPV 18 DNA NEG Negative -   OTHER HR HPV NEG Negative -     Reviewed and updated as needed this visit by clinical staff  Tobacco  Allergies    Med Hx  Surg Hx  Fam Hx  Soc Hx        Reviewed and updated as needed this visit by Provider                  Past Medical History:   Diagnosis Date     Abdominal pain, unspecified site 9/13/2002     Abnormal feces 2/24/2004     Abnormal maternal glucose tolerance, antepartum 2/20/2001     Acute sinusitis, unspecified 4/7/2005     Chest  pain, unspecified 2003     Diarrhea 2004     Dizziness and giddiness 2008     Headache(784.0) 2002     History of pelvic mass 2014     hypothyroidism 2012     Irregular menstrual cycle 2003     Migraine, unspecified, without mention of intractable migraine without mention of status migrainosus 2004     Other disorder of menstruation and other abnormal bleeding from female genital tract 2006     Other malaise and fatigue 2002     Other specified hemorrhage in early pregnancy, antepartum 2000     Social anxiety disorder 2015     Supervision of other normal pregnancy 2000     Syncope and collapse 2003     Unspecified symptom associated with female genital organs 2002      Past Surgical History:   Procedure Laterality Date     APPENDECTOMY        SECTION        SECTION       HERNIORRHAPHY UMBILICAL  2013    Procedure: HERNIORRHAPHY UMBILICAL;  OPEN SUPRA UMBILICAL HERNIA REPAIR WITH MESH;  Surgeon: Shante Goss DO;  Location: [facility]     lymph node bx neck-benign       OB History   No obstetric history on file.       ROS:  CONSTITUTIONAL: NEGATIVE for fever, chills, change in weight  INTEGUMENTARY/SKIN: NEGATIVE for worrisome rashes, moles or lesions  EYES: NEGATIVE for vision changes or irritation  ENT: NEGATIVE for ear, mouth and throat problems  RESP: NEGATIVE for significant cough or SOB  BREAST: NEGATIVE for masses, tenderness or discharge  CV: NEGATIVE for chest pain, palpitations or peripheral edema  GI: NEGATIVE for nausea, abdominal pain, heartburn, or change in bowel habits  : NEGATIVE for unusual urinary or vaginal symptoms. No vaginal bleeding.  MUSCULOSKELETAL:positive, chronic pain from migraine tension to back pain to hip pain allover.   NEURO: NEGATIVE for weakness, dizziness or paresthesias  ENDOCRINE: NEGATIVE for temperature intolerance, skin/hair changes  PSYCHIATRIC:see hpi, very anxious, not  "sure what to do.     OBJECTIVE:   /68   Pulse 78   Temp 98.2  F (36.8  C)   Resp 18   Ht 1.59 m (5' 2.6\")   Wt 71.9 kg (158 lb 9.6 oz)   LMP 07/14/2021 (Exact Date)   SpO2 100%   BMI 28.46 kg/m    EXAM:  GENERAL: healthy, alert and no distress, seated leg crossed and relaxed.   EYES: Eyes grossly normal to inspection, PERRL and conjunctivae and sclerae normal  HENT: ear canals and TM's normal, nose and mouth without ulcers or lesions  NECK: no adenopathy, no asymmetry, masses, or scars and thyroid normal to palpation  RESP: lungs clear to auscultation - no rales, rhonchi or wheezes  BREAST: normal without defined masses it is thickened. No  tenderness or nipple discharge and no palpable axillary masses or adenopathy  CV: regular rate and rhythm, normal S1 S2, no S3 or S4, no murmur, click or rub, no peripheral edema and peripheral pulses strong  ABDOMEN: soft, nontender, no hepatosplenomegaly, no masses and bowel sounds normal   (female): normal female external genitalia, normal urethral meatus, vaginal mucosa pink, moist, well rugated, and normal cervix/adnexa/uterus without masses or discharge  MS: no gross musculoskeletal defects noted, no edema  SKIN: no suspicious lesions or rashes  NEURO: Normal strength and tone, mentation intact and speech normal  PSYCH: mentation appears normal, affect normal/bright    Diagnostic Test Results:  Labs reviewed in Epic  No results found for this or any previous visit (from the past 24 hour(s)).  Office Visit on 03/12/2021   Component Date Value Ref Range Status     Hemoglobin A1C 03/11/2021 5.7* 0 - 5.6 % Final    Comment: Normal <5.7% Prediabetes 5.7-6.4%  Diabetes 6.5% or higher - adopted from ADA   consensus guidelines.       TSH 03/11/2021 0.12* 0.40 - 4.00 mU/L Final     Sodium 03/11/2021 140  133 - 144 mmol/L Final     Potassium 03/11/2021 3.8  3.4 - 5.3 mmol/L Final     Chloride 03/11/2021 110* 94 - 109 mmol/L Final     Carbon Dioxide 03/11/2021 23  20 - " 32 mmol/L Final     Anion Gap 03/11/2021 7  3 - 14 mmol/L Final     Glucose 03/11/2021 101* 70 - 99 mg/dL Final    Fasting specimen     Urea Nitrogen 03/11/2021 7  7 - 30 mg/dL Final     Creatinine 03/11/2021 0.81  0.52 - 1.04 mg/dL Final     GFR Estimate 03/11/2021 88  >60 mL/min/[1.73_m2] Final    Comment: Non  GFR Calc  Starting 12/18/2018, serum creatinine based estimated GFR (eGFR) will be   calculated using the Chronic Kidney Disease Epidemiology Collaboration   (CKD-EPI) equation.       GFR Estimate If Black 03/11/2021 >90  >60 mL/min/[1.73_m2] Final    Comment:  GFR Calc  Starting 12/18/2018, serum creatinine based estimated GFR (eGFR) will be   calculated using the Chronic Kidney Disease Epidemiology Collaboration   (CKD-EPI) equation.       Calcium 03/11/2021 8.4* 8.5 - 10.1 mg/dL Final     Bilirubin Total 03/11/2021 0.5  0.2 - 1.3 mg/dL Final     Albumin 03/11/2021 3.8  3.4 - 5.0 g/dL Final     Protein Total 03/11/2021 7.3  6.8 - 8.8 g/dL Final     Alkaline Phosphatase 03/11/2021 89  40 - 150 U/L Final     ALT 03/11/2021 28  0 - 50 U/L Final     AST 03/11/2021 17  0 - 45 U/L Final     Cholesterol 03/11/2021 182  <200 mg/dL Final     Triglycerides 03/11/2021 117  <150 mg/dL Final    Fasting specimen     HDL Cholesterol 03/11/2021 45* >49 mg/dL Final     LDL Cholesterol Calculated 03/11/2021 114* <100 mg/dL Final    Comment: Above desirable:  100-129 mg/dl  Borderline High:  130-159 mg/dL  High:             160-189 mg/dL  Very high:       >189 mg/dl       Non HDL Cholesterol 03/11/2021 137* <130 mg/dL Final    Comment: Above Desirable:  130-159 mg/dl  Borderline high:  160-189 mg/dl  High:             190-219 mg/dl  Very high:       >219 mg/dl       T4 Free 03/11/2021 1.14  0.76 - 1.46 ng/dL Final     Estimated Average Glucose 03/11/2021 117  mg/dL Final       ASSESSMENT/PLAN:   1. Routine general medical examination at a health care facility  She is not needing a pap smear.  Mammogram ordered both breast are thickened in upper outer quadrant. She Is not showing skin color changes. Needs a pap smear next year.  Got one of her Covid shots. Concerned about number 2 as the first one gave her arm soreness.  Long discussion on reactions vs side effects and allergy.   - GLUCOSE; Future  - Hemoglobin A1c; Future  - TSH with free T4 reflex; Future  - TSH with free T4 reflex  - Hemoglobin A1c  - GLUCOSE  - T4 free    2. Elevated fasting glucose  Since pregnancy has been checking.  Glucometer broken. Prediabetic at times.  Taking nothing right now for this.   - blood glucose monitoring (NO BRAND SPECIFIED) meter device kit; Use to test blood sugar 1 times daily or as directed. Preferred blood glucose meter OR supplies to accompany: Blood Glucose Monitor Brands: per insurance.  Dispense: 1 kit; Refill: 0  - blood glucose (NO BRAND SPECIFIED) test strip; Use to test blood sugar 1 times daily or as directed. To accompany: Blood Glucose Monitor Brands: per insurance.  Dispense: 100 strip; Refill: 6  - blood glucose calibration (NO BRAND SPECIFIED) solution; To accompany: Blood Glucose Monitor Brands: per insurance.  Dispense: 1 each; Refill: 0  - thin (NO BRAND SPECIFIED) lancets; Use with lanceting device. To accompany: Blood Glucose Monitor Brands: per insurance.  Dispense: 100 each; Refill: 6  - alcohol swab prep pads; Use to swab area of injection/frantz as directed.  Dispense: 100 each; Refill: 3    Patient has been advised of split billing requirements and indicates understanding: Yes  COUNSELING:   Reviewed preventive health counseling, as reflected in patient instructions       Regular exercise       Healthy diet/nutrition       Vision screening       Immunizations    Declined: second covid shot  due to Concerns about side effects/safety               Aspirin prophylaxis       (Cecilia)menopause management       Advance Care Planning    Estimated body mass index is 28.46 kg/m  as calculated from  "the following:    Height as of this encounter: 1.59 m (5' 2.6\").    Weight as of this encounter: 71.9 kg (158 lb 9.6 oz).        She reports that she quit smoking about 21 years ago. Her smoking use included cigarettes. She has a 20.00 pack-year smoking history. She has never used smokeless tobacco.      Counseling Resources:  ATP IV Guidelines  Pooled Cohorts Equation Calculator  Breast Cancer Risk Calculator  BRCA-Related Cancer Risk Assessment: FHS-7 Tool  FRAX Risk Assessment  ICSI Preventive Guidelines  Dietary Guidelines for Americans, 2010  USDA's MyPlate  ASA Prophylaxis  Lung CA Screening    MYLES Tabor  [facility] - [place]  "

## 2021-07-22 NOTE — NURSING NOTE
"Chief Complaint   Patient presents with     Physical       Initial /68   Pulse 78   Temp 98.2  F (36.8  C)   Resp 18   Ht 1.59 m (5' 2.6\")   Wt 71.9 kg (158 lb 9.6 oz)   LMP 07/14/2021 (Exact Date)   SpO2 100%   BMI 28.46 kg/m   Estimated body mass index is 28.46 kg/m  as calculated from the following:    Height as of this encounter: 1.59 m (5' 2.6\").    Weight as of this encounter: 71.9 kg (158 lb 9.6 oz).  Medication Reconciliation: gurwinder Grady  "

## 2021-07-23 ASSESSMENT — ANXIETY QUESTIONNAIRES: GAD7 TOTAL SCORE: 3

## 2021-08-05 ENCOUNTER — MYC MEDICAL ADVICE (OUTPATIENT)
Dept: FAMILY MEDICINE | Facility: OTHER | Age: 46
End: 2021-08-05

## 2021-09-15 ENCOUNTER — NURSE TRIAGE (OUTPATIENT)
Dept: FAMILY MEDICINE | Facility: OTHER | Age: 46
End: 2021-09-15

## 2021-09-15 ENCOUNTER — TRANSFERRED RECORDS (OUTPATIENT)
Dept: HEALTH INFORMATION MANAGEMENT | Facility: CLINIC | Age: 46
End: 2021-09-15

## 2021-09-15 LAB
CREATININE (EXTERNAL): 0.82 MG/DL (ref 0.4–1)
GFR ESTIMATED (EXTERNAL): >60 ML/MIN/1.73M2
GLUCOSE (EXTERNAL): 114 MG/DL (ref 70–99)
POTASSIUM (EXTERNAL): 4 MEQ/L (ref 3.4–5.1)

## 2021-09-15 NOTE — TELEPHONE ENCOUNTER
"Advised patient to ER, patient struggling to talk because of the sob     Reason for Disposition    MODERATE difficulty breathing (e.g., speaks in phrases, SOB even at rest, pulse 100-120)    Answer Assessment - Initial Assessment Questions  1. COVID-19 DIAGNOSIS: \"Who made your Coronavirus (COVID-19) diagnosis?\" \"Was it confirmed by a positive lab test?\" If not diagnosed by a HCP, ask \"Are there lots of cases (community spread) where you live?\" (See public health department website, if unsure)      no  2. COVID-19 EXPOSURE: \"Was there any known exposure to COVID before the symptoms began?\" CDC Definition of close contact: within 6 feet (2 meters) for a total of 15 minutes or more over a 24-hour period.       no  3. ONSET: \"When did the COVID-19 symptoms start?\"       Last saturday  4. WORST SYMPTOM: \"What is your worst symptom?\" (e.g., cough, fever, shortness of breath, muscle aches)      Sob body aches congestion  5. COUGH: \"Do you have a cough?\" If so, ask: \"How bad is the cough?\"        no  6. FEVER: \"Do you have a fever?\" If so, ask: \"What is your temperature, how was it measured, and when did it start?\"      chills  7. RESPIRATORY STATUS: \"Describe your breathing?\" (e.g., shortness of breath, wheezing, unable to speak)       wheezing  8. BETTER-SAME-WORSE: \"Are you getting better, staying the same or getting worse compared to yesterday?\"  If getting worse, ask, \"In what way?\"      same  9. HIGH RISK DISEASE: \"Do you have any chronic medical problems?\" (e.g., asthma, heart or lung disease, weak immune system, obesity, etc.)      hypertension  10. PREGNANCY: \"Is there any chance you are pregnant?\" \"When was your last menstrual period?\"        no  11. OTHER SYMPTOMS: \"Do you have any other symptoms?\"  (e.g., chills, fatigue, headache, loss of smell or taste, muscle pain, sore throat; new loss of smell or taste especially support the diagnosis of COVID-19)        Fatigue    Protocols used: CORONAVIRUS (COVID-19) " DIAGNOSED OR [id number] 3.25

## 2021-11-14 NOTE — PROGRESS NOTES
SUBJECTIVE:                                                    Gita Jones is a 43 year old female who presents to clinic today for the following health issues:        Water retention      Duration: 2 weeks    Description (location/character/radiation): swelling when eating or drinking    Intensity:  moderate    Accompanying signs and symptoms: none    History (similar episodes/previous evaluation): None    Precipitating or alleviating factors: food and liquides    Therapies tried and outcome: None    Patient has been to the ER a couple of times, first on 19 for chest pain and then again on 3/6 for neck pain.  She did have a stress test after her first ER visit and follow-up with Cardiology.  She states when her neck started to hurt, she was worried about her carotids.  She was given prednisone for radicular symptoms, but isn't sure if she should take it.      She states her BP goes up quite a bit with exercise, and she has been instructed to take 1/4 tablet of metoprolol daily.  She notes that she will get lightheaded often, which is why dose is at 1/4 tablet.  She states she has been retaining fluid more, and she is concerned about that.  She wants to be active and exercising, and with symptoms above, she is concerned.      She is also due for recheck of thyroid, dose was adjusted not that long ago.       Problem list and histories reviewed & adjusted, as indicated.  Additional history: as documented    Patient Active Problem List   Diagnosis     Hypothyroidism     History of pelvic mass     Constipation     Social anxiety disorder     Migraine     ACP (advance care planning)     Migraine without aura and without status migrainosus, not intractable     Blood glucose elevated     Past Surgical History:   Procedure Laterality Date     APPENDECTOMY        SECTION  2007      SECTION  2005     HERNIORRHAPHY UMBILICAL  2013    Procedure: HERNIORRHAPHY UMBILICAL;  OPEN SUPRA UMBILICAL  HERNIA REPAIR WITH MESH;  Surgeon: Shante Goss DO;  Location: [facility]     lymph node bx neck-benign         Social History     Tobacco Use     Smoking status: Former Smoker     Packs/day: 2.00     Years: 10.00     Pack years: 20.00     Types: Cigarettes     Last attempt to quit: 2000     Years since quittin.1     Smokeless tobacco: Never Used     Tobacco comment: quit in . no passive exposure.   Substance Use Topics     Alcohol use: No     Family History   Problem Relation Age of Onset     Cancer Mother 54        cause of death     Diabetes Father      Lipids Father         hyperlipidemia     Hypertension Father      Cerebrovascular Disease Father      Neurologic Disorder Son         seizures         Current Outpatient Medications   Medication Sig Dispense Refill     aspirin (ASA) 81 MG tablet Take 81 mg by mouth       blood glucose monitoring (ONE TOUCH DELICA) lancets Use to test blood sugar 4 times daily. 100 each 0     blood glucose monitoring (ONETOUCH VERIO IQ) test strip Use to test blood sugar 4 times daily. 100 each 11     cyclobenzaprine (FLEXERIL) 5 MG tablet Take 1 tablet by mouth 3 times daily as needed  0     hydrochlorothiazide (HYDRODIURIL) 12.5 MG tablet Take 1 tablet (12.5 mg) by mouth daily 30 tablet 2     levothyroxine (SYNTHROID/LEVOTHROID) 150 MCG tablet Take 1 tablet (150 mcg) by mouth daily 90 tablet 3     multivitamin, therapeutic with minerals (THERA-VIT-M) TABS Take 1 tablet by mouth daily.       pantoprazole (PROTONIX) 40 MG EC tablet Take 1 tablet (40 mg) by mouth daily 90 tablet 0     SUMAtriptan (IMITREX) 25 MG tablet Take 1 tablet (25 mg) by mouth at onset of headache for migraine 30 tablet 3     Allergies   Allergen Reactions     Dye [Contrast Dye] Rash     IV dye, iodine containing contrast media     Nickel Rash       ROS:  Constitutional, HEENT, cardiovascular, pulmonary, gi and gu systems are negative, except as otherwise noted.    OBJECTIVE:     /72 (BP  "Location: Left arm, Patient Position: Sitting, Cuff Size: Adult Large)   Pulse 78   Temp 97.4  F (36.3  C) (Tympanic)   Resp 14   Ht 1.575 m (5' 2\")   Wt 70.3 kg (155 lb)   SpO2 99%   BMI 28.35 kg/m    Body mass index is 28.35 kg/m .  GENERAL: healthy, alert and no distress  NECK: no adenopathy, no carotid bruits and spasm noted in the paraspinous and most significantly, in the SCM muscle on the left  RESP: lungs clear to auscultation - no rales, rhonchi or wheezes  CV: regular rates and rhythm, normal S1 S2, no S3 or S4 and no murmur, click or rub  PSYCH: mentation appears normal, affect normal/bright    Diagnostic Test Results:  none     ASSESSMENT/PLAN:     1. Essential hypertension  With lightheadedness, we will stop metoprolol and start hydrochlorothiazide to help with swelling.  Mechanisms of both medications explained.  Follow-up in one month with Akash Davis for BP recheck and labs.  - hydrochlorothiazide (HYDRODIURIL) 12.5 MG tablet; Take 1 tablet (12.5 mg) by mouth daily  Dispense: 30 tablet; Refill: 2    2. Acquired hypothyroidism  Labs updated.  - TSH with free T4 reflex    3. Strain of neck muscle, sequela  Patient is encouraged to take prednisone as prescribed.  No carotid bruits heard, patient is reassured.  Follow-up if no improvement noted.      Over 45 minutes are spent with the patient, over 30 minutes of which was in education and counseling regarding current conditions and treatment/therapy options/risks/benefits/etc, including current medications and side effects, medications recommended, differential diagnosis for symptoms, and follow-up planning.  All questions are answered, patient seems satisfied with today's visit.      Nedra Driscoll MD  [facility]      " no

## 2021-11-22 NOTE — TELEPHONE ENCOUNTER
pantoprazole (PROTONIX) 40 MG EC tablet  Last Written Prescription Date:  1/4/19  Last Fill Quantity: 90,   # refills: 0  Last Office Visit: 7/18/19  Future Office visit:      DATE OF PROCEDURE:  11/19/2021

 

SURGEON:  Elliott Joe MD

 

PREOPERATIVE DIAGNOSIS:  Dysphagia, status post Judi-en-Y gastric bypass.

 

POSTOPERATIVE DIAGNOSIS:  Normal upper GI endoscopy, status post Judi-en-Y gastric bypass.

 

OPERATIVE PROCEDURE:  Upper GI endoscopy.

 

ANESTHESIA:  IV sedation.

 

INDICATION FOR PROCEDURE:  This is a 61-year-old male, status post Judi-en-Y gastric bypass,

presenting with some sense of dysphagia referable to his distal esophagus.  Plan is to

proceed with an upper GI endoscopy with dilation as indicated.  Potential risks including

bleeding and perforation were discussed, and the patient wishes to proceed.

 

DETAILS OF PROCEDURE:  The patient was taken to the operating room and placed in a left

lateral decubitus position.  IV sedation was administered after which the upper GI endoscope

was passed orally through the length of the esophagus and into the gastric pouch and from

there through the gastrojejunostomy roughly 20 cm into the Judi limb.

 

The  findings included a fibrous ring at the esophagogastric junction, but this was

extremely wide open more than 2 diameters of the gastroscope, i.e. around 2 cm and not

likely to result in any significant dysphagia.  Otherwise,  there is a  small amount of

bowel present within the Judi limb, but no other abnormalities were noted.  At this point,

the scope was then withdrawn and the procedure was then concluded.

 

The patient may need some protein in terms of chewing his food little bit better.  At this

point, some level of dysphagia may actually be helpful since he has moved into [facility]

in [place], he has more food available, and also drinking 2 to 4 L of soda pop daily,

which he says he is trying to cut down.  If the dysphagia continues significantly over time,

one option would be to try the course of Levsin sublingually, but at this time, we will not

add any additional medications to the Carafate and Protonix that he is presently on.

Routine bariatric followup will be arranged.

 

 

 

 

Ellitot Joe MD

DD:  11/21/2021 01:01:52

DT:  11/21/2021 10:42:58

Job #:  [id number]

## 2022-01-23 DIAGNOSIS — E03.9 ACQUIRED HYPOTHYROIDISM: ICD-10-CM

## 2022-01-23 RX ORDER — LEVOTHYROXINE SODIUM 150 UG/1
TABLET ORAL
Qty: 90 TABLET | Refills: 0 | Status: CANCELLED | OUTPATIENT
Start: 2022-01-23

## 2022-01-24 RX ORDER — LEVOTHYROXINE SODIUM 150 UG/1
TABLET ORAL
Qty: 90 TABLET | Refills: 0 | Status: SHIPPED | OUTPATIENT
Start: 2022-01-24 | End: 2022-02-09

## 2022-01-24 RX ORDER — LEVOTHYROXINE SODIUM 150 UG/1
TABLET ORAL
Qty: 90 TABLET | Refills: 0 | Status: SHIPPED | OUTPATIENT
Start: 2022-01-24 | End: 2023-02-06

## 2022-01-24 NOTE — TELEPHONE ENCOUNTER
Synthroid      Last Written Prescription Date:  8.13.21  Last Fill Quantity: #90,   # refills: 0  Last Office Visit: 7.22.21  Future Office visit:    Next 5 appointments (look out 90 days)    Feb 09, 2022 10:45 AM  (Arrive by 10:30 AM)  SHORT with MYLES Tierney  [facility] [place] ([facility] [place] ) [address]  [place] [address]  [phone]           Routing refill request to provider for review/approval because:

## 2022-01-24 NOTE — TELEPHONE ENCOUNTER
SYNTHROID      Last Written Prescription Date:  9-  Last Fill Quantity: 90,   # refills: 0  Last Office Visit: 7-  Future Office visit:    Next 5 appointments (look out 90 days)    Feb 09, 2022 10:45 AM  (Arrive by 10:30 AM)  SHORT with MYLES Tierney  [facility] ([facility] [place] ) [address]  [place] [address]  [phone]           Routing refill request to provider for review/approval because:  Drug not on the [facility], [facility] or [facility] refill protocol or controlled substance

## 2022-01-25 ENCOUNTER — MYC REFILL (OUTPATIENT)
Dept: FAMILY MEDICINE | Facility: OTHER | Age: 47
End: 2022-01-25
Payer: COMMERCIAL

## 2022-01-25 DIAGNOSIS — E03.9 ACQUIRED HYPOTHYROIDISM: ICD-10-CM

## 2022-01-26 RX ORDER — LEVOTHYROXINE SODIUM 150 UG/1
150 TABLET ORAL DAILY
Qty: 90 TABLET | Refills: 0 | OUTPATIENT
Start: 2022-01-26

## 2022-02-08 NOTE — PROGRESS NOTES
"  Assessment & Plan     Raynaud's disease without gangrene  Strong family hx of having some changes in her hands 2 kids with Lupus and one with Raynauds  Checking her labs today and will make a referral over to see Rheumatology.   - TSH with free T4 reflex; Future  - CBC with platelets and differential; Future  - Rheumatoid factor; Future  - DNA double stranded antibodies; Future  - HLA I&II LOW RESOLUTION HLA-A -B&-DRB1/3/4/5  - Anti Nuclear Dari IgG by IFA with Reflex; Future  - Lymes Antibodies Total ([facility])  - CRP, inflammation; Future    Family history of lupus erythematosus  As above. Pain , acing, swelling of joints skin redness not sounding like vasculitis but more of redness blotchy skin.  Nn butterfly rash. Ether.   - Rheumatoid factor; Future  - DNA double stranded antibodies; Future  - HLA I&II LOW RESOLUTION HLA-A -B&-DRB1/3/4/5  - Anti Nuclear Dari IgG by IFA with Reflex; Future  - ESR: Erythrocyte sedimentation rate; Future  - CK total; Future    Acquired hypothyroidism  Recheck levels.  Feels it is good.   - TSH with free T4 reflex; Future    Blood glucose elevated  Sugars in am are 120 and doing well. She is going to have a1c done here today.     Benign paroxysmal positional vertigo due to bilateral vestibular disorder  Recurrent issues on and off. doesn't seem to matter which side she turns to but she will be given testing. Use of meclizine is recommended.   - Lymes Antibodies Total ([facility])  - CRP, inflammation; Future  - ESR: Erythrocyte sedimentation rate; Future    Review of external notes as documented elsewhere in note  Ordering of each unique test  Prescription drug management  5 minutes spent on the date of the encounter doing chart review, patient visit and documentation        BMI:   Estimated body mass index is 30 kg/m  as calculated from the following:    Height as of this encounter: 1.575 m (5' 2\").    Weight as of this encounter: 74.4 kg (164 lb).   Weight management plan: Discussed " healthy diet and exercise guidelines    See Patient Instructions    No follow-ups on file.    MYLES Tabor  [facility] - LIANNA Pelayo is a 46 year old who presents for the following health issues     HPI     Concern - Consult on lupus  Gita wants to discuss the possibility of lupus, daughter Maribeth was just diagnosed about a year ago. She is currently have pains through her body, has to be careful walking it often hurts, has to walk softly.  Ankles and feet. Painful and swelling of hands and knees are on and off. No warmth to any joint.  No fevers.     Elevated Glucose Follow-up    How often are you checking your blood sugar? One time daily  What time of day are you checking your blood sugars (select all that apply)?  Before meals  Have you had any blood sugars above 200?  No  Have you had any blood sugars below 70?  No    What symptoms do you notice when your blood sugar is low?  None    What concerns do you have today about your diabetes? None     Do you have any of these symptoms? (Select all that apply)  Numbness in feet and Burning in feet      BP Readings from Last 2 Encounters:   02/09/22 108/70   07/22/21 106/68     Hemoglobin A1C POCT (%)   Date Value   03/11/2021 5.7 (H)   12/04/2019 5.8 (H)     Hemoglobin A1C (%)   Date Value   07/22/2021 5.3     LDL Cholesterol Calculated (mg/dL)   Date Value   03/11/2021 114 (H)   12/04/2019 150 (H)         Hypertension Follow-up      Do you check your blood pressure regularly outside of the clinic? No     Are you following a low salt diet? No    Are your blood pressures ever more than 140 on the top number (systolic) OR more   than 90 on the bottom number (diastolic), for example 140/90? No    Hypothyroidism Follow-up      Since last visit, patient describes the following symptoms: Weight stable, no hair loss, no skin changes, no constipation, no loose stools      How many servings of fruits and vegetables do you eat daily?   "0-1    On average, how many sweetened beverages do you drink each day (Examples: soda, juice, sweet tea, etc.  Do NOT count diet or artificially sweetened beverages)?   4    How many days per week do you exercise enough to make your heart beat faster? 5    How many minutes a day do you exercise enough to make your heart beat faster? 30 - 60    How many days per week do you miss taking your medication? 0      Review of Systems   CONSTITUTIONAL:NEGATIVE for fever, chills, change in weight  INTEGUMENTARY/SKIN: NEGATIVE for worrisome rashes, moles or lesions  EYES: NEGATIVE for vision changes or irritation  RESP:NEGATIVE for significant cough or SOB  CV: NEGATIVE for chest pain, palpitations or peripheral edema  MUSCULOSKELETAL: NEGATIVE for significant arthralgias or myalgia  NEURO: NEGATIVE for weakness, dizziness or paresthesias  ENDOCRINE: NEGATIVE for temperature intolerance, skin/hair changes  PSYCHIATRIC: NEGATIVE for changes in mood or affect      Objective    /70 (BP Location: Left arm, Patient Position: Sitting, Cuff Size: Adult Regular)   Pulse 78   Temp 98.5  F (36.9  C) (Tympanic)   Ht 1.575 m (5' 2\")   Wt 74.4 kg (164 lb)   SpO2 98%   BMI 30.00 kg/m    Body mass index is 30 kg/m .  Physical Exam   GENERAL: healthy, alert and no distress  EYES: Eyes grossly normal to inspection, PERRL and conjunctivae and sclerae normal  HENT: ear canals and TM's normal, nose and mouth without ulcers or lesions  NECK: no adenopathy, no asymmetry, masses, or scars and thyroid normal to palpation  RESP: lungs clear to auscultation - no rales, rhonchi or wheezes  CV: regular rate and rhythm, normal S1 S2, no S3 or S4, no murmur, click or rub, no peripheral edema and peripheral pulses strong  ABDOMEN: soft, nontender, no hepatosplenomegaly, no masses and bowel sounds normal  MS: no gross musculoskeletal defects noted, no edema  SKIN: no suspicious lesions or rashes  NEURO: Normal strength and tone, mentation intact " and speech normal  PSYCH: mentation appears normal, affect normal/bright    No results found for any visits on 02/09/22.          Results for orders placed or performed in visit on 02/09/22   TSH with free T4 reflex     Status: Abnormal   Result Value Ref Range    TSH 8.55 (H) 0.40 - 4.00 mU/L   Rheumatoid factor     Status: Normal   Result Value Ref Range    Rheumatoid Factor <7 <12 IU/mL   DNA double stranded antibodies     Status: Normal   Result Value Ref Range    DNA (ds) Antibody 1.6 <10.0 IU/mL    Narrative    Negative:  Less than 10  Equivocal: 10-15  Positive:  Greater than 15   Anti Nuclear Dari IgG by IFA with Reflex     Status: Normal   Result Value Ref Range    HELENA interpretation Negative Negative   CRP, inflammation     Status: Normal   Result Value Ref Range    CRP Inflammation <2.9 0.0 - 8.0 mg/L   ESR: Erythrocyte sedimentation rate     Status: Normal   Result Value Ref Range    Erythrocyte Sedimentation Rate 4 0 - 20 mm/hr   CK total     Status: Normal   Result Value Ref Range    CK 81 30 - 225 U/L   Hemoglobin A1c     Status: None   Result Value Ref Range    Estimated Average Glucose 108 mg/dL    Hemoglobin A1C 5.4 0.0 - 5.6 %   Lyme Disease Dari with reflex to WB Serum     Status: Normal   Result Value Ref Range    Lyme Disease Antibodies Total 0.07 <0.90   HLA-B27 Typing     Status: None   Result Value Ref Range    [id number] METHOD SSOP     B locus B27 Neg    CBC with platelets and differential     Status: None   Result Value Ref Range    WBC Count 6.2 4.0 - 11.0 10e3/uL    RBC Count 5.04 3.80 - 5.20 10e6/uL    Hemoglobin 14.4 11.7 - 15.7 g/dL    Hematocrit 42.9 35.0 - 47.0 %    MCV 85 78 - 100 fL    MCH 28.6 26.5 - 33.0 pg    MCHC 33.6 31.5 - 36.5 g/dL    RDW 12.8 10.0 - 15.0 %    Platelet Count 183 150 - 450 10e3/uL    % Neutrophils 64 %    % Lymphocytes 27 %    % Monocytes 7 %    % Eosinophils 2 %    % Basophils 0 %    % Immature Granulocytes 0 %    NRBCs per 100 WBC 0 <1 /100    Absolute  Neutrophils 3.9 1.6 - 8.3 10e3/uL    Absolute Lymphocytes 1.6 0.8 - 5.3 10e3/uL    Absolute Monocytes 0.5 0.0 - 1.3 10e3/uL    Absolute Eosinophils 0.1 0.0 - 0.7 10e3/uL    Absolute Basophils 0.0 0.0 - 0.2 10e3/uL    Absolute Immature Granulocytes 0.0 <=0.4 10e3/uL    Absolute NRBCs 0.0 10e3/uL   T4 free     Status: Normal   Result Value Ref Range    Free T4 1.03 0.76 - 1.46 ng/dL   CBC with platelets and differential     Status: None    Narrative    The following orders were created for panel order CBC with platelets and differential.  Procedure                               Abnormality         Status                     ---------                               -----------         ------                     CBC with platelets and d...[[id number]]                      Final result                 Please view results for these tests on the individual orders.

## 2022-02-09 ENCOUNTER — OFFICE VISIT (OUTPATIENT)
Dept: FAMILY MEDICINE | Facility: OTHER | Age: 47
End: 2022-02-09
Attending: PHYSICIAN ASSISTANT
Payer: COMMERCIAL

## 2022-02-09 VITALS
SYSTOLIC BLOOD PRESSURE: 108 MMHG | HEART RATE: 78 BPM | DIASTOLIC BLOOD PRESSURE: 70 MMHG | WEIGHT: 164 LBS | BODY MASS INDEX: 30.18 KG/M2 | TEMPERATURE: 98.5 F | HEIGHT: 62 IN | OXYGEN SATURATION: 98 %

## 2022-02-09 DIAGNOSIS — Z84.0 FAMILY HISTORY OF LUPUS ERYTHEMATOSUS: ICD-10-CM

## 2022-02-09 DIAGNOSIS — R73.9 BLOOD GLUCOSE ELEVATED: ICD-10-CM

## 2022-02-09 DIAGNOSIS — H81.13 BENIGN PAROXYSMAL POSITIONAL VERTIGO DUE TO BILATERAL VESTIBULAR DISORDER: ICD-10-CM

## 2022-02-09 DIAGNOSIS — M79.672 LEFT FOOT PAIN: ICD-10-CM

## 2022-02-09 DIAGNOSIS — E03.9 ACQUIRED HYPOTHYROIDISM: ICD-10-CM

## 2022-02-09 DIAGNOSIS — I73.00 RAYNAUD'S DISEASE WITHOUT GANGRENE: Primary | ICD-10-CM

## 2022-02-09 LAB
BASOPHILS # BLD AUTO: 0 10E3/UL (ref 0–0.2)
BASOPHILS NFR BLD AUTO: 0 %
CK SERPL-CCNC: 81 U/L (ref 30–225)
CRP SERPL-MCNC: <2.9 MG/L (ref 0–8)
EOSINOPHIL # BLD AUTO: 0.1 10E3/UL (ref 0–0.7)
EOSINOPHIL NFR BLD AUTO: 2 %
ERYTHROCYTE [DISTWIDTH] IN BLOOD BY AUTOMATED COUNT: 12.8 % (ref 10–15)
ERYTHROCYTE [SEDIMENTATION RATE] IN BLOOD BY WESTERGREN METHOD: 4 MM/HR (ref 0–20)
EST. AVERAGE GLUCOSE BLD GHB EST-MCNC: 108 MG/DL
HBA1C MFR BLD: 5.4 % (ref 0–5.6)
HCT VFR BLD AUTO: 42.9 % (ref 35–47)
HGB BLD-MCNC: 14.4 G/DL (ref 11.7–15.7)
IMM GRANULOCYTES # BLD: 0 10E3/UL
IMM GRANULOCYTES NFR BLD: 0 %
LYMPHOCYTES # BLD AUTO: 1.6 10E3/UL (ref 0.8–5.3)
LYMPHOCYTES NFR BLD AUTO: 27 %
MCH RBC QN AUTO: 28.6 PG (ref 26.5–33)
MCHC RBC AUTO-ENTMCNC: 33.6 G/DL (ref 31.5–36.5)
MCV RBC AUTO: 85 FL (ref 78–100)
MONOCYTES # BLD AUTO: 0.5 10E3/UL (ref 0–1.3)
MONOCYTES NFR BLD AUTO: 7 %
NEUTROPHILS # BLD AUTO: 3.9 10E3/UL (ref 1.6–8.3)
NEUTROPHILS NFR BLD AUTO: 64 %
NRBC # BLD AUTO: 0 10E3/UL
NRBC BLD AUTO-RTO: 0 /100
PLATELET # BLD AUTO: 183 10E3/UL (ref 150–450)
RBC # BLD AUTO: 5.04 10E6/UL (ref 3.8–5.2)
T4 FREE SERPL-MCNC: 1.03 NG/DL (ref 0.76–1.46)
TSH SERPL DL<=0.005 MIU/L-ACNC: 8.55 MU/L (ref 0.4–4)
WBC # BLD AUTO: 6.2 10E3/UL (ref 4–11)

## 2022-02-09 PROCEDURE — 86038 ANTINUCLEAR ANTIBODIES: CPT | Mod: ZL | Performed by: PHYSICIAN ASSISTANT

## 2022-02-09 PROCEDURE — 81374 HLA I TYPING 1 ANTIGEN LR: CPT | Mod: ZL | Performed by: PHYSICIAN ASSISTANT

## 2022-02-09 PROCEDURE — 84443 ASSAY THYROID STIM HORMONE: CPT | Mod: ZL | Performed by: PHYSICIAN ASSISTANT

## 2022-02-09 PROCEDURE — 84439 ASSAY OF FREE THYROXINE: CPT | Mod: ZL | Performed by: PHYSICIAN ASSISTANT

## 2022-02-09 PROCEDURE — 85004 AUTOMATED DIFF WBC COUNT: CPT | Mod: ZL | Performed by: PHYSICIAN ASSISTANT

## 2022-02-09 PROCEDURE — 85652 RBC SED RATE AUTOMATED: CPT | Mod: ZL | Performed by: PHYSICIAN ASSISTANT

## 2022-02-09 PROCEDURE — 86431 RHEUMATOID FACTOR QUANT: CPT | Mod: ZL | Performed by: PHYSICIAN ASSISTANT

## 2022-02-09 PROCEDURE — 86140 C-REACTIVE PROTEIN: CPT | Mod: ZL | Performed by: PHYSICIAN ASSISTANT

## 2022-02-09 PROCEDURE — 36415 COLL VENOUS BLD VENIPUNCTURE: CPT | Mod: ZL | Performed by: PHYSICIAN ASSISTANT

## 2022-02-09 PROCEDURE — G0463 HOSPITAL OUTPT CLINIC VISIT: HCPCS

## 2022-02-09 PROCEDURE — 86225 DNA ANTIBODY NATIVE: CPT | Mod: ZL | Performed by: PHYSICIAN ASSISTANT

## 2022-02-09 PROCEDURE — 99214 OFFICE O/P EST MOD 30 MIN: CPT | Performed by: PHYSICIAN ASSISTANT

## 2022-02-09 PROCEDURE — 82550 ASSAY OF CK (CPK): CPT | Mod: ZL | Performed by: PHYSICIAN ASSISTANT

## 2022-02-09 PROCEDURE — 86618 LYME DISEASE ANTIBODY: CPT | Mod: ZL | Performed by: PHYSICIAN ASSISTANT

## 2022-02-09 PROCEDURE — G0463 HOSPITAL OUTPT CLINIC VISIT: HCPCS | Performed by: PHYSICIAN ASSISTANT

## 2022-02-09 PROCEDURE — 83036 HEMOGLOBIN GLYCOSYLATED A1C: CPT | Mod: ZL | Performed by: PHYSICIAN ASSISTANT

## 2022-02-09 ASSESSMENT — ANXIETY QUESTIONNAIRES
6. BECOMING EASILY ANNOYED OR IRRITABLE: NOT AT ALL
7. FEELING AFRAID AS IF SOMETHING AWFUL MIGHT HAPPEN: NOT AT ALL
2. NOT BEING ABLE TO STOP OR CONTROL WORRYING: NOT AT ALL
GAD7 TOTAL SCORE: 0
4. TROUBLE RELAXING: NOT AT ALL
3. WORRYING TOO MUCH ABOUT DIFFERENT THINGS: NOT AT ALL
5. BEING SO RESTLESS THAT IT IS HARD TO SIT STILL: NOT AT ALL
1. FEELING NERVOUS, ANXIOUS, OR ON EDGE: NOT AT ALL

## 2022-02-09 ASSESSMENT — MIFFLIN-ST. JEOR: SCORE: 1337.15

## 2022-02-09 ASSESSMENT — PATIENT HEALTH QUESTIONNAIRE - PHQ9: SUM OF ALL RESPONSES TO PHQ QUESTIONS 1-9: 0

## 2022-02-09 NOTE — NURSING NOTE
"Chief Complaint   Patient presents with     possible lupus     possible ra     bone ontop of left foot       Initial /70 (BP Location: Left arm, Patient Position: Sitting, Cuff Size: Adult Regular)   Pulse 78   Temp 98.5  F (36.9  C) (Tympanic)   Ht 1.575 m (5' 2\")   Wt 74.4 kg (164 lb)   SpO2 98%   BMI 30.00 kg/m   Estimated body mass index is 30 kg/m  as calculated from the following:    Height as of this encounter: 1.575 m (5' 2\").    Weight as of this encounter: 74.4 kg (164 lb).  Medication Reconciliation: complete  Kathi Tucker LPN  "

## 2022-02-10 ASSESSMENT — ANXIETY QUESTIONNAIRES: GAD7 TOTAL SCORE: 0

## 2022-02-11 LAB
ANA SER QL IF: NEGATIVE
B BURGDOR IGG+IGM SER QL: 0.07
DSDNA AB SER-ACNC: 1.6 IU/ML
RHEUMATOID FACT SER NEPH-ACNC: <7 IU/ML

## 2022-02-15 LAB
B LOCUS: NORMAL
B27TEST METHOD: NORMAL

## 2022-03-28 DIAGNOSIS — R73.01 ELEVATED FASTING GLUCOSE: ICD-10-CM

## 2022-03-28 DIAGNOSIS — I10 EXERTIONAL HYPERTENSION: ICD-10-CM

## 2022-03-28 RX ORDER — METOPROLOL SUCCINATE 25 MG/1
TABLET, EXTENDED RELEASE ORAL
Qty: 90 TABLET | Refills: 1 | Status: SHIPPED | OUTPATIENT
Start: 2022-03-28 | End: 2023-04-27 | Stop reason: ALTCHOICE

## 2022-03-28 NOTE — TELEPHONE ENCOUNTER
Toprol  Last Written Prescription Date: 10/27/20  Last Fill Quantity: 90 # of Refills: 1  Last Office Visit: 2/9/22

## 2022-03-29 RX ORDER — BLOOD-GLUCOSE METER
EACH MISCELLANEOUS
Qty: 1 KIT | Refills: 1 | Status: SHIPPED | OUTPATIENT
Start: 2022-03-29 | End: 2023-04-27 | Stop reason: ALTCHOICE

## 2022-03-30 ENCOUNTER — TELEPHONE (OUTPATIENT)
Dept: FAMILY MEDICINE | Facility: OTHER | Age: 47
End: 2022-03-30
Payer: COMMERCIAL

## 2022-03-30 ENCOUNTER — ANCILLARY PROCEDURE (OUTPATIENT)
Dept: GENERAL RADIOLOGY | Facility: OTHER | Age: 47
End: 2022-03-30
Attending: PHYSICIAN ASSISTANT
Payer: COMMERCIAL

## 2022-03-30 DIAGNOSIS — M79.672 LEFT FOOT PAIN: ICD-10-CM

## 2022-03-30 DIAGNOSIS — M79.89 FOOT SWELLING: ICD-10-CM

## 2022-03-30 DIAGNOSIS — M79.89 FOOT SWELLING: Primary | ICD-10-CM

## 2022-03-30 PROCEDURE — 73630 X-RAY EXAM OF FOOT: CPT | Mod: TC,LT

## 2022-07-07 ENCOUNTER — TRANSFERRED RECORDS (OUTPATIENT)
Dept: HEALTH INFORMATION MANAGEMENT | Facility: CLINIC | Age: 47
End: 2022-07-07

## 2022-08-22 ENCOUNTER — MYC MEDICAL ADVICE (OUTPATIENT)
Dept: FAMILY MEDICINE | Facility: OTHER | Age: 47
End: 2022-08-22

## 2022-08-22 DIAGNOSIS — E03.9 ACQUIRED HYPOTHYROIDISM: Primary | ICD-10-CM

## 2022-08-24 ENCOUNTER — TELEPHONE (OUTPATIENT)
Dept: FAMILY MEDICINE | Facility: OTHER | Age: 47
End: 2022-08-24

## 2022-08-24 DIAGNOSIS — R73.01 ELEVATED FASTING GLUCOSE: ICD-10-CM

## 2022-08-24 DIAGNOSIS — E03.9 ACQUIRED HYPOTHYROIDISM: Primary | ICD-10-CM

## 2022-08-24 NOTE — TELEPHONE ENCOUNTER
To: Nurse to Monique Lopez  Please return her call, she is requesting labs for thyroid.  Phone is [phone].  Thank you

## 2022-08-25 ENCOUNTER — LAB (OUTPATIENT)
Dept: LAB | Facility: OTHER | Age: 47
End: 2022-08-25
Payer: COMMERCIAL

## 2022-08-25 DIAGNOSIS — E03.9 ACQUIRED HYPOTHYROIDISM: ICD-10-CM

## 2022-08-25 PROCEDURE — 36415 COLL VENOUS BLD VENIPUNCTURE: CPT | Mod: ZL

## 2022-08-25 PROCEDURE — 84443 ASSAY THYROID STIM HORMONE: CPT | Mod: ZL

## 2022-08-25 RX ORDER — ISOPROPYL ALCOHOL 0.75 G/1
SWAB TOPICAL
Qty: 100 EACH | Refills: 3 | Status: SHIPPED | OUTPATIENT
Start: 2022-08-25 | End: 2023-04-27 | Stop reason: ALTCHOICE

## 2022-08-25 NOTE — TELEPHONE ENCOUNTER
Alcohol swas      Last Written Prescription Date:  7/22/21  Last Fill Quantity: 100,   # refills: 3  Last Office Visit: 2/9/22  Future Office visit:

## 2022-08-26 LAB — TSH SERPL DL<=0.005 MIU/L-ACNC: 1.17 MU/L (ref 0.4–4)

## 2022-08-30 ENCOUNTER — MYC MEDICAL ADVICE (OUTPATIENT)
Dept: FAMILY MEDICINE | Facility: OTHER | Age: 47
End: 2022-08-30

## 2022-08-30 DIAGNOSIS — K04.7 DENTAL INFECTION: Primary | ICD-10-CM

## 2022-08-30 RX ORDER — AMOXICILLIN 500 MG/1
500 CAPSULE ORAL 2 TIMES DAILY
Qty: 30 CAPSULE | Refills: 0 | Status: SHIPPED | OUTPATIENT
Start: 2022-08-30 | End: 2023-03-15

## 2023-03-10 DIAGNOSIS — E03.9 ACQUIRED HYPOTHYROIDISM: ICD-10-CM

## 2023-03-13 NOTE — TELEPHONE ENCOUNTER
Synthroid     Last Written Prescription Date:  2/9/23  Last Fill Quantity: 30,   # refills: 0  Last Office Visit: 2/9/22  Future Office visit:    Next 5 appointments (look out 90 days)    Mar 15, 2023  3:30 PM  (Arrive by 3:15 PM)  SHORT with MYLES Tierney  [facility] [place] ([facility] [place] ) [address]  [place] [address]  [phone]

## 2023-03-15 ENCOUNTER — OFFICE VISIT (OUTPATIENT)
Dept: FAMILY MEDICINE | Facility: OTHER | Age: 48
End: 2023-03-15
Attending: PHYSICIAN ASSISTANT
Payer: COMMERCIAL

## 2023-03-15 VITALS
DIASTOLIC BLOOD PRESSURE: 70 MMHG | SYSTOLIC BLOOD PRESSURE: 119 MMHG | TEMPERATURE: 98.8 F | HEIGHT: 62 IN | OXYGEN SATURATION: 98 % | WEIGHT: 164.8 LBS | BODY MASS INDEX: 30.33 KG/M2 | HEART RATE: 88 BPM

## 2023-03-15 DIAGNOSIS — Z82.0 FAMILY HISTORY OF MIGRAINE HEADACHES: Primary | ICD-10-CM

## 2023-03-15 DIAGNOSIS — K04.7 DENTAL INFECTION: ICD-10-CM

## 2023-03-15 DIAGNOSIS — E03.9 ACQUIRED HYPOTHYROIDISM: Primary | ICD-10-CM

## 2023-03-15 DIAGNOSIS — E03.9 ACQUIRED HYPOTHYROIDISM: ICD-10-CM

## 2023-03-15 DIAGNOSIS — R16.1 ENLARGEMENT, SPLEEN: ICD-10-CM

## 2023-03-15 DIAGNOSIS — H53.412 SCOTOMA INVOLVING CENTRAL AREA OF LEFT EYE: ICD-10-CM

## 2023-03-15 LAB
BASOPHILS # BLD AUTO: 0 10E3/UL (ref 0–0.2)
BASOPHILS NFR BLD AUTO: 0 %
EOSINOPHIL # BLD AUTO: 0.2 10E3/UL (ref 0–0.7)
EOSINOPHIL NFR BLD AUTO: 2 %
ERYTHROCYTE [DISTWIDTH] IN BLOOD BY AUTOMATED COUNT: 13.1 % (ref 10–15)
HCT VFR BLD AUTO: 44.1 % (ref 35–47)
HGB BLD-MCNC: 14.8 G/DL (ref 11.7–15.7)
IMM GRANULOCYTES # BLD: 0 10E3/UL
IMM GRANULOCYTES NFR BLD: 0 %
LYMPHOCYTES # BLD AUTO: 1.7 10E3/UL (ref 0.8–5.3)
LYMPHOCYTES NFR BLD AUTO: 22 %
MCH RBC QN AUTO: 27.5 PG (ref 26.5–33)
MCHC RBC AUTO-ENTMCNC: 33.6 G/DL (ref 31.5–36.5)
MCV RBC AUTO: 82 FL (ref 78–100)
MONOCYTES # BLD AUTO: 0.6 10E3/UL (ref 0–1.3)
MONOCYTES NFR BLD AUTO: 8 %
NEUTROPHILS # BLD AUTO: 5 10E3/UL (ref 1.6–8.3)
NEUTROPHILS NFR BLD AUTO: 68 %
NRBC # BLD AUTO: 0 10E3/UL
NRBC BLD AUTO-RTO: 0 /100
PLATELET # BLD AUTO: 248 10E3/UL (ref 150–450)
RBC # BLD AUTO: 5.39 10E6/UL (ref 3.8–5.2)
T4 FREE SERPL-MCNC: 1.63 NG/DL (ref 0.9–1.7)
TSH SERPL DL<=0.005 MIU/L-ACNC: 0.11 UIU/ML (ref 0.3–4.2)
WBC # BLD AUTO: 7.4 10E3/UL (ref 4–11)

## 2023-03-15 PROCEDURE — 99214 OFFICE O/P EST MOD 30 MIN: CPT | Performed by: PHYSICIAN ASSISTANT

## 2023-03-15 PROCEDURE — 85025 COMPLETE CBC W/AUTO DIFF WBC: CPT | Mod: ZL | Performed by: PHYSICIAN ASSISTANT

## 2023-03-15 PROCEDURE — 36415 COLL VENOUS BLD VENIPUNCTURE: CPT | Mod: ZL | Performed by: PHYSICIAN ASSISTANT

## 2023-03-15 PROCEDURE — 84439 ASSAY OF FREE THYROXINE: CPT | Mod: ZL | Performed by: PHYSICIAN ASSISTANT

## 2023-03-15 PROCEDURE — 84443 ASSAY THYROID STIM HORMONE: CPT | Mod: ZL | Performed by: PHYSICIAN ASSISTANT

## 2023-03-15 PROCEDURE — G0463 HOSPITAL OUTPT CLINIC VISIT: HCPCS | Performed by: PHYSICIAN ASSISTANT

## 2023-03-15 RX ORDER — DILTIAZEM HYDROCHLORIDE 30 MG/1
30 TABLET, FILM COATED ORAL 3 TIMES DAILY
Qty: 90 TABLET | Refills: 3 | Status: SHIPPED | OUTPATIENT
Start: 2023-03-15 | End: 2023-04-27 | Stop reason: ALTCHOICE

## 2023-03-15 RX ORDER — PROMETHAZINE HYDROCHLORIDE 25 MG/1
25 TABLET ORAL EVERY 6 HOURS PRN
Qty: 30 TABLET | Refills: 3 | Status: SHIPPED | OUTPATIENT
Start: 2023-03-15 | End: 2024-07-18

## 2023-03-15 RX ORDER — AMOXICILLIN 500 MG/1
500 CAPSULE ORAL 2 TIMES DAILY
Qty: 30 CAPSULE | Refills: 0 | Status: SHIPPED | OUTPATIENT
Start: 2023-03-15 | End: 2023-04-27

## 2023-03-15 RX ORDER — LEVOTHYROXINE SODIUM 137 UG/1
137 TABLET ORAL DAILY
Qty: 90 TABLET | Refills: 0 | Status: SHIPPED | OUTPATIENT
Start: 2023-03-15 | End: 2023-04-27 | Stop reason: ALTCHOICE

## 2023-03-15 RX ORDER — LEVOTHYROXINE SODIUM 150 UG/1
TABLET ORAL
Qty: 30 TABLET | Refills: 0 | Status: SHIPPED | OUTPATIENT
Start: 2023-03-15 | End: 2023-08-11

## 2023-03-15 RX ORDER — DIPHENHYDRAMINE HCL 50 MG
50 CAPSULE ORAL EVERY 6 HOURS PRN
Qty: 3 CAPSULE | Refills: 1 | Status: SHIPPED | OUTPATIENT
Start: 2023-03-15 | End: 2023-04-27 | Stop reason: ALTCHOICE

## 2023-03-15 RX ORDER — METHYLPREDNISOLONE 32 MG/1
32 TABLET ORAL DAILY
Qty: 3 TABLET | Refills: 1 | Status: SHIPPED | OUTPATIENT
Start: 2023-03-15 | End: 2023-04-27 | Stop reason: ALTCHOICE

## 2023-03-15 ASSESSMENT — ANXIETY QUESTIONNAIRES
GAD7 TOTAL SCORE: 2
GAD7 TOTAL SCORE: 2
2. NOT BEING ABLE TO STOP OR CONTROL WORRYING: SEVERAL DAYS
3. WORRYING TOO MUCH ABOUT DIFFERENT THINGS: NOT AT ALL
5. BEING SO RESTLESS THAT IT IS HARD TO SIT STILL: NOT AT ALL
7. FEELING AFRAID AS IF SOMETHING AWFUL MIGHT HAPPEN: SEVERAL DAYS
1. FEELING NERVOUS, ANXIOUS, OR ON EDGE: NOT AT ALL
4. TROUBLE RELAXING: NOT AT ALL
6. BECOMING EASILY ANNOYED OR IRRITABLE: NOT AT ALL

## 2023-03-15 ASSESSMENT — PATIENT HEALTH QUESTIONNAIRE - PHQ9: SUM OF ALL RESPONSES TO PHQ QUESTIONS 1-9: 2

## 2023-03-15 ASSESSMENT — PAIN SCALES - GENERAL: PAINLEVEL: NO PAIN (0)

## 2023-03-15 NOTE — PROGRESS NOTES
"  Assessment & Plan     Dental infection  Refill is given.   - amoxicillin (AMOXIL) 500 MG capsule; Take 1 capsule (500 mg) by mouth 2 times daily    Family history of migraine headaches  She has this as well but not taking prevention or rescue. Family hx of autoimmune disease.   - promethazine (PHENERGAN) 25 MG tablet; Take 1 tablet (25 mg) by mouth every 6 hours as needed for nausea or vomiting  - diltiazem (CARDIZEM) 30 MG tablet; Take 1 tablet (30 mg) by mouth 3 times daily    Enlargement, spleen  Checking liver to spleen. Needing precontrast treatment. She is not getting any relief from this and she has a problem with pain in left upper abdomen. \"pinching. \"  - CBC with platelets and differential; Future  - NM Liver Spleen Scan; Future  - diphenhydrAMINE (BENADRYL) 50 MG capsule; Take 1 capsule (50 mg) by mouth every 6 hours as needed for itching or allergies Administer 30 min - 2 hours pre - IV contrast injection  - methylPREDNISolone (MEDROL) 32 MG tablet; Take 1 tablet (32 mg) by mouth daily 12 hours prior to the procedure with IV contrast    Scotoma involving central area of left eye  For migraines with visual changes and arms going numb.  We will premedicate her   - MRA Brain ([place] [facility] [place]) wo & w Contrast; Future  - diphenhydrAMINE (BENADRYL) 50 MG capsule; Take 1 capsule (50 mg) by mouth every 6 hours as needed for itching or allergies Administer 30 min - 2 hours pre - IV contrast injection  - methylPREDNISolone (MEDROL) 32 MG tablet; Take 1 tablet (32 mg) by mouth daily 12 hours prior to the procedure with IV contrast    Review of external notes as documented elsewhere in note  Ordering of each unique test  Prescription drug management  10  minutes spent on the date of the encounter doing chart review, history and exam, documentation and further activities per the note       BMI:   Estimated body mass index is 30.14 kg/m  as calculated from the following:    Height as of this encounter: 1.575 m (5' " "2\").    Weight as of this encounter: 74.8 kg (164 lb 12.8 oz).       See Patient Instructions    No follow-ups on file.    MYLES Tabor  [facility] - LIANNA Pelayo is a 47 year old accompanied by her self, presenting for the following health issues:  Follow Up and Recheck Medication      HPI     Hypertension Follow-up      Do you check your blood pressure regularly outside of the clinic? Yes     Are you following a low salt diet? Yes    Are your blood pressures ever more than 140 on the top number (systolic) OR more   than 90 on the bottom number (diastolic), for example 140/90? No    Anxiety Follow-Up    How are you doing with your anxiety since your last visit? No change    Are you having other symptoms that might be associated with anxiety? Yes:  lack of sleep     Have you had a significant life event? OTHER: family things going on      Are you feeling depressed? No    Do you have any concerns with your use of alcohol or other drugs? No    Social History     Tobacco Use     Smoking status: Former     Packs/day: 2.00     Years: 10.00     Pack years: 20.00     Types: Cigarettes     Quit date: 2000     Years since quittin.2     Smokeless tobacco: Never     Tobacco comments:     quit in . no passive exposure.   Substance Use Topics     Alcohol use: No     Drug use: No     NICOLE-7 SCORE 2021 2022 3/15/2023   Total Score 3 0 2     PHQ 2021 2022 3/15/2023   PHQ-9 Total Score 2 0 2   Q9: Thoughts of better off dead/self-harm past 2 weeks Not at all Not at all Not at all     Last PHQ-9 3/15/2023   1.  Little interest or pleasure in doing things 0   2.  Feeling down, depressed, or hopeless 0   3.  Trouble falling or staying asleep, or sleeping too much 2   4.  Feeling tired or having little energy 0   5.  Poor appetite or overeating 0   6.  Feeling bad about yourself 0   7.  Trouble concentrating 0   8.  Moving slowly or restless 0   Q9: Thoughts of better " "off dead/self-harm past 2 weeks 0   PHQ-9 Total Score 2   Difficulty at work, home, or with people -     NICOLE-7  3/15/2023   1. Feeling nervous, anxious, or on edge 0   2. Not being able to stop or control worrying 1   3. Worrying too much about different things 0   4. Trouble relaxing 0   5. Being so restless that it is hard to sit still 0   6. Becoming easily annoyed or irritable 0   7. Feeling afraid, as if something awful might happen 1   NICOLE-7 Total Score 2   If you checked any problems, how difficult have they made it for you to do your work, take care of things at home, or get along with other people? -       Migraine     Since your last clinic visit, how have your headaches changed?  Worsened- caused some vision changes.     How often are you getting headaches or migraines? Once every couple months.      Are you able to do normal daily activities when you have a migraine? No    Are you taking rescue/relief medications? (Select all that apply) ibuprofen (Advil, Motrin)    How helpful is your rescue/relief medication?  I get only a small amount of relief    Are you taking any medications to prevent migraines? (Select all that apply)  No    In the past 4 weeks, how often have you gone to urgent care or the emergency room because of your headaches?  0    Hypothyroidism Follow-up      Since last visit, patient describes the following symptoms: weight gain of 5 lbs, dry skin, constipation, fatigue and hair loss- sometimes           Review of Systems   Constitutional, HEENT, cardiovascular, pulmonary, gi and gu systems are negative, except as otherwise noted.      Objective    /70 (BP Location: Left arm, Patient Position: Sitting, Cuff Size: Adult Regular)   Pulse 88   Temp 98.8  F (37.1  C) (Tympanic)   Ht 1.575 m (5' 2\")   Wt 74.8 kg (164 lb 12.8 oz)   SpO2 98%   BMI 30.14 kg/m    Body mass index is 30.14 kg/m .  Physical Exam   GENERAL: healthy, alert and no distress  NECK: no adenopathy, no " asymmetry, masses, or scars and thyroid normal to palpation  RESP: lungs clear to auscultation - no rales, rhonchi or wheezes  CV: regular rate and rhythm, normal S1 S2, no S3 or S4, no murmur, click or rub, no peripheral edema and peripheral pulses strong  ABDOMEN: soft, nontender, no hepatosplenomegaly, no masses and bowel sounds normal  MS: no gross musculoskeletal defects noted, no edema  PSYCH: mentation appears normal, affect normal/bright    Lab on 08/25/2022   Component Date Value Ref Range Status     TSH 08/25/2022 1.17  0.40 - 4.00 mU/L Final

## 2023-03-17 ENCOUNTER — TELEPHONE (OUTPATIENT)
Dept: FAMILY MEDICINE | Facility: OTHER | Age: 48
End: 2023-03-17

## 2023-03-20 NOTE — TELEPHONE ENCOUNTER
Blake Chan, I don't think we talked about a brain MRI?  She was asking me and Akash both. Not sure why

## 2023-03-21 DIAGNOSIS — R42 DIZZINESS: Primary | ICD-10-CM

## 2023-03-22 ENCOUNTER — TELEPHONE (OUTPATIENT)
Dept: FAMILY MEDICINE | Facility: OTHER | Age: 48
End: 2023-03-22

## 2023-03-22 DIAGNOSIS — R42 DIZZINESS: Primary | ICD-10-CM

## 2023-04-03 ENCOUNTER — TELEPHONE (OUTPATIENT)
Dept: FAMILY MEDICINE | Facility: OTHER | Age: 48
End: 2023-04-03

## 2023-04-03 NOTE — TELEPHONE ENCOUNTER
Prema from [facility] called regarding this patient. She states she would like a call back from you. She says they have received orders but the notes are a little unclear and would like to speak with you directly. Can you give her a call back at [phone].

## 2023-04-04 ENCOUNTER — TELEPHONE (OUTPATIENT)
Dept: FAMILY MEDICINE | Facility: OTHER | Age: 48
End: 2023-04-04

## 2023-04-27 ENCOUNTER — OFFICE VISIT (OUTPATIENT)
Dept: FAMILY MEDICINE | Facility: OTHER | Age: 48
End: 2023-04-27
Attending: PHYSICIAN ASSISTANT
Payer: COMMERCIAL

## 2023-04-27 VITALS
HEART RATE: 77 BPM | SYSTOLIC BLOOD PRESSURE: 120 MMHG | BODY MASS INDEX: 29.45 KG/M2 | OXYGEN SATURATION: 98 % | WEIGHT: 161 LBS | DIASTOLIC BLOOD PRESSURE: 70 MMHG | RESPIRATION RATE: 16 BRPM | TEMPERATURE: 97.4 F

## 2023-04-27 DIAGNOSIS — H53.413 SCOTOMA INVOLVING CENTRAL AREA IN VISUAL FIELD, BILATERAL: ICD-10-CM

## 2023-04-27 DIAGNOSIS — G43.111 INTRACTABLE MIGRAINE WITH AURA WITH STATUS MIGRAINOSUS: ICD-10-CM

## 2023-04-27 DIAGNOSIS — R73.9 BLOOD GLUCOSE ELEVATED: ICD-10-CM

## 2023-04-27 DIAGNOSIS — R16.1 SPLEEN ENLARGED: Primary | ICD-10-CM

## 2023-04-27 DIAGNOSIS — E03.9 ACQUIRED HYPOTHYROIDISM: ICD-10-CM

## 2023-04-27 LAB
CRP SERPL-MCNC: <3 MG/L
T4 FREE SERPL-MCNC: 1.07 NG/DL (ref 0.9–1.7)
TSH SERPL DL<=0.005 MIU/L-ACNC: 5.7 UIU/ML (ref 0.3–4.2)

## 2023-04-27 PROCEDURE — 99214 OFFICE O/P EST MOD 30 MIN: CPT | Performed by: PHYSICIAN ASSISTANT

## 2023-04-27 PROCEDURE — 84439 ASSAY OF FREE THYROXINE: CPT | Mod: ZL | Performed by: PHYSICIAN ASSISTANT

## 2023-04-27 PROCEDURE — 84443 ASSAY THYROID STIM HORMONE: CPT | Mod: ZL | Performed by: PHYSICIAN ASSISTANT

## 2023-04-27 PROCEDURE — 86140 C-REACTIVE PROTEIN: CPT | Mod: ZL | Performed by: PHYSICIAN ASSISTANT

## 2023-04-27 PROCEDURE — 36415 COLL VENOUS BLD VENIPUNCTURE: CPT | Mod: ZL | Performed by: PHYSICIAN ASSISTANT

## 2023-04-27 PROCEDURE — G0463 HOSPITAL OUTPT CLINIC VISIT: HCPCS

## 2023-04-27 RX ORDER — ONDANSETRON 4 MG/1
TABLET, ORALLY DISINTEGRATING ORAL
COMMUNITY
Start: 2022-10-26 | End: 2023-04-27 | Stop reason: ALTCHOICE

## 2023-04-27 RX ORDER — OMEGA-3 FATTY ACIDS/FISH OIL 300-1000MG
200 CAPSULE ORAL
COMMUNITY
End: 2023-04-27 | Stop reason: ALTCHOICE

## 2023-04-27 RX ORDER — BLOOD-GLUCOSE METER
KIT MISCELLANEOUS
Qty: 1 KIT | Refills: 0 | Status: SHIPPED | OUTPATIENT
Start: 2023-04-27 | End: 2024-01-16

## 2023-04-27 RX ORDER — ONDANSETRON 4 MG/1
4 TABLET, ORALLY DISINTEGRATING ORAL
COMMUNITY
Start: 2022-10-26 | End: 2023-04-27 | Stop reason: ALTCHOICE

## 2023-04-27 RX ORDER — CYCLOBENZAPRINE HCL 10 MG
10 TABLET ORAL
COMMUNITY
Start: 2022-10-26 | End: 2023-04-27 | Stop reason: ALTCHOICE

## 2023-04-27 RX ORDER — METHYLPREDNISOLONE 16 MG/1
TABLET ORAL
Qty: 4 TABLET | Refills: 0 | Status: SHIPPED | OUTPATIENT
Start: 2023-04-27

## 2023-04-27 RX ORDER — LANCETS 28 GAUGE
EACH MISCELLANEOUS
Qty: 100 EACH | Refills: 4 | Status: SHIPPED | OUTPATIENT
Start: 2023-04-27 | End: 2024-07-18

## 2023-04-27 RX ORDER — CYCLOBENZAPRINE HCL 10 MG
TABLET ORAL
COMMUNITY
Start: 2022-10-26 | End: 2023-04-27 | Stop reason: ALTCHOICE

## 2023-04-27 RX ORDER — IMIQUIMOD 12.5 MG/.25G
CREAM TOPICAL
COMMUNITY
Start: 2023-03-20 | End: 2023-04-27 | Stop reason: ALTCHOICE

## 2023-04-27 ASSESSMENT — PAIN SCALES - GENERAL: PAINLEVEL: SEVERE PAIN (6)

## 2023-04-27 NOTE — PROGRESS NOTES
Assessment & Plan     Spleen enlarged  She needs a MRI of abdomen. Was in ER again last week. Severe pain and couldn't get out of bed. No imaging done in ER. Allergy to dye.  Will take premed. Should be fine.    - methylPREDNISolone (MEDROL) 16 MG tablet; Take 32 mg by mouth 12 hours before the procedure and repeat 32 mg by mouth 2 hours before the procedure to prevent contrast reaction.  - MR Abdomen MRCP w/o & w Contrast; Future    Scotoma involving central area in visual field, bilateral  migraines and has trouble with floating scotoma in her eyes.,  Right worse than left. Will check MRI of brain with contrast again needing premed. Did not start Cardizem. Not taking that but Phenergan does help  Her nausea and vomiting. Still has tongue numbness on left side. Was on right in the past. Seems to alternate.   - MR Brain w/o & w Contrast; Future  1. Spleen enlarged  Recurrent upper abdominal pain.  Severe on and off was in the ER on the 17th of April without any luck.    - methylPREDNISolone (MEDROL) 16 MG tablet; Take 32 mg by mouth 12 hours before the procedure and repeat 32 mg by mouth 2 hours before the procedure to prevent contrast reaction.  Dispense: 4 tablet; Refill: 0  - MR Abdomen MRCP w/o & w Contrast; Future  - Adult GI  Referral - Consult Only; Future  - CRP, inflammation; Future  - CRP, inflammation; Future    2. 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  Really impacting her life.   - MR Brain w/o & w Contrast; Future  - methylPREDNISolone (MEDROL) 16 MG tablet; Take 32 mg by mouth 12 hours before the procedure and repeat 32 mg by mouth 2 hours before the procedure to prevent contrast reaction.  Dispense: 4 tablet; Refill: 0  - Adult Neurology  Referral    3. Acquired hypothyroidism  Mild over replacement. Recheck.  - TSH with free T4 reflex; Future    4. Blood glucose elevated  Has been  very shaky, her current meter is not covered we will try free style.   - blood glucose monitoring (FREESTYLE FREEDOM LITE) meter device kit; Use to test blood sugar 2 times daily or as directed.  Dispense: 1 kit; Refill: 0  - blood glucose monitoring (FREESTYLE) lancets; Use to test blood sugar 2 times daily or as directed.  Dispense: 100 each; Refill: 4    5. Intractable migraine with aura with status migrainosus  Failed beta blocker, CCB , tritan, is taking Phenergan.  Helping with vomiting.  Has vision changes. Ruled out any type of rhematological cause of changes. She is very anxious to find out the cause of all her sx.   - Adult Neurology  Referral    Review of external notes as documented elsewhere in note  Ordering of each unique test  Prescription drug management  10  minutes spent by me on the date of the encounter doing chart review, history and exam, documentation and further activities per the note       See Patient Instructions    No follow-ups on file.    MYLES Tabor  [facility] - LIANNA Pelayo is a 47 year old, presenting for the following health issues:  Follow Up (Discuss ordering MRI)        4/27/2023    10:41 AM   Additional Questions   Roomed by Sebastian Selby LPN   Accompanied by self         4/27/2023    10:41 AM   Patient Reported Additional Medications   Patient reports taking the following new medications none     HPI     She has been checked for Lupus or seropositive disease and was negative.           Review of Systems   CONSTITUTIONAL: NEGATIVE for fever, chills, change in weight  INTEGUMENTARY/SKIN: NEGATIVE for worrisome rashes, moles or lesions  EYES: vision feels weak.    ENT/MOUTH: NEGATIVE for ear, mouth and throat problems  RESP: NEGATIVE for significant cough or SOB  CV: NEGATIVE for chest pain, palpitations or peripheral edema  GI: bowels are moving better than normal has one every few days.   MUSCULOSKELETAL: NEGATIVE for  significant arthralgias or myalgia, negative work up for seropositive disease.   ENDOCRINE: needs a new meter. Hasn't been checking, very shaky and weak.  Has gotten worse since her belly pain.   PSYCHIATRIC: mood is stable.       Objective    /70 (BP Location: Left arm, Patient Position: Sitting, Cuff Size: Adult Regular)   Pulse 77   Temp 97.4  F (36.3  C) (Tympanic)   Resp 16   Wt 73 kg (161 lb)   LMP 04/24/2023 (Exact Date)   SpO2 98%   BMI 29.45 kg/m    Body mass index is 29.45 kg/m .  Physical Exam   GENERAL: healthy, alert and no distress  EYES: Eyes grossly normal to inspection, PERRL and conjunctivae and sclerae normal  HENT: ear canals and TM's normal, nose and mouth without ulcers or lesions  NECK: no adenopathy, no asymmetry, masses, or scars and thyroid normal to palpation  RESP: lungs clear to auscultation - no rales, rhonchi or wheezes  CV: regular rate and rhythm, normal S1 S2, no S3 or S4, no murmur, click or rub, no peripheral edema and peripheral pulses strong  ABDOMEN: soft, nontender, no hepatosplenomegaly, no masses and bowel sounds normal  MS: extremities normal- no gross deformities noted  SKIN: no suspicious lesions or rashes  Reviewed all labs from [facility] everywhere all good. inflammatory markers are ok.   No results found for any visits on 04/27/23.       Reviewed results from [facility] on [facility] everywhere

## 2023-04-28 DIAGNOSIS — R73.9 BLOOD GLUCOSE ELEVATED: Primary | ICD-10-CM

## 2023-04-28 RX ORDER — BLOOD-GLUCOSE METER
KIT MISCELLANEOUS
Qty: 100 STRIP | Refills: 4 | Status: SHIPPED | OUTPATIENT
Start: 2023-04-28 | End: 2023-11-17

## 2023-05-01 DIAGNOSIS — K90.9 IMPAIRED INTESTINAL ABSORPTION: Primary | ICD-10-CM

## 2023-05-02 ENCOUNTER — HOSPITAL ENCOUNTER (OUTPATIENT)
Dept: MRI IMAGING | Facility: HOSPITAL | Age: 48
Discharge: HOME | End: 2023-05-02
Attending: PHYSICIAN ASSISTANT
Payer: COMMERCIAL

## 2023-05-02 DIAGNOSIS — R16.1 SPLEEN ENLARGED: ICD-10-CM

## 2023-05-02 DIAGNOSIS — H53.412 SCOTOMA INVOLVING CENTRAL AREA OF LEFT EYE: ICD-10-CM

## 2023-05-02 DIAGNOSIS — H53.413 SCOTOMA INVOLVING CENTRAL AREA IN VISUAL FIELD, BILATERAL: ICD-10-CM

## 2023-05-02 PROCEDURE — 70544 MR ANGIOGRAPHY HEAD W/O DYE: CPT

## 2023-05-02 PROCEDURE — 70553 MRI BRAIN STEM W/O & W/DYE: CPT

## 2023-05-02 PROCEDURE — 74183 MRI ABD W/O CNTR FLWD CNTR: CPT

## 2023-05-02 PROCEDURE — A9585 GADOBUTROL INJECTION: HCPCS | Performed by: RADIOLOGY

## 2023-05-02 PROCEDURE — 255N000002 HC RX 255 OP 636: Performed by: RADIOLOGY

## 2023-05-02 PROCEDURE — 74181 MRI ABDOMEN W/O CONTRAST: CPT

## 2023-05-02 RX ORDER — GADOBUTROL 604.72 MG/ML
10 INJECTION INTRAVENOUS ONCE
Status: COMPLETED | OUTPATIENT
Start: 2023-05-02 | End: 2023-05-02

## 2023-05-02 RX ADMIN — GADOBUTROL 10 ML: 604.72 INJECTION INTRAVENOUS at 07:12

## 2023-05-08 NOTE — RESULT ENCOUNTER NOTE
She should never stop thyroid medication.   When did she stop this.  I asked if she had been taking it every day!  It will affect the number . So we need her to take this.

## 2023-05-08 NOTE — RESULT ENCOUNTER NOTE
Notify MRCP is all fine, no changes in spleen .  No other changes. Liver pancrease kidneys adrenals are all normal.

## 2023-05-19 NOTE — NURSING NOTE
"Chief Complaint   Patient presents with     Thyroid Problem     Hypertension       Initial BP (!) 144/76 (BP Location: Right arm, Patient Position: Sitting, Cuff Size: Adult Regular)   Pulse 82   Temp 98.3  F (36.8  C) (Tympanic)   Resp 16   Ht 1.575 m (5' 2\")   Wt 78.4 kg (172 lb 14.4 oz)   SpO2 98%   BMI 31.62 kg/m   Estimated body mass index is 31.62 kg/m  as calculated from the following:    Height as of this encounter: 1.575 m (5' 2\").    Weight as of this encounter: 78.4 kg (172 lb 14.4 oz).  Medication Reconciliation: complete  Nae Wong MA  " Patient has not completed Ct chest expected date was 1/2023. Reminder letter sent through my chart.

## 2023-06-22 ENCOUNTER — TRANSFERRED RECORDS (OUTPATIENT)
Dept: HEALTH INFORMATION MANAGEMENT | Facility: CLINIC | Age: 48
End: 2023-06-22

## 2023-08-08 DIAGNOSIS — E03.9 ACQUIRED HYPOTHYROIDISM: ICD-10-CM

## 2023-08-09 NOTE — TELEPHONE ENCOUNTER
Failed protocol    TSH   Date Value Ref Range Status   04/27/2023 5.70 (H) 0.30 - 4.20 uIU/mL Final   08/25/2022 1.17 0.40 - 4.00 mU/L Final   03/11/2021 0.12 (L) 0.40 - 4.00 mU/L Final        levothyroxine (SYNTHROID/LEVOTHROID) 150 MCG tablet       Last Written Prescription Date:  3/15/23  Last Fill Quantity: 30,   # refills: 0  Last Office Visit: 4/27/23  Future Office visit:       Routing refill request to provider for review/approval because:

## 2023-08-11 RX ORDER — LEVOTHYROXINE SODIUM 150 UG/1
TABLET ORAL
Qty: 30 TABLET | Refills: 0 | Status: SHIPPED | OUTPATIENT
Start: 2023-08-11 | End: 2023-09-22

## 2023-09-19 DIAGNOSIS — E03.9 ACQUIRED HYPOTHYROIDISM: ICD-10-CM

## 2023-09-21 NOTE — TELEPHONE ENCOUNTER
Synthroid      Last Written Prescription Date:  08/11/23  Last Fill Quantity: 30,   # refills: 0  Last Office Visit: 04/27/23  Future Office visit:

## 2023-09-22 RX ORDER — LEVOTHYROXINE SODIUM 150 UG/1
TABLET ORAL
Qty: 30 TABLET | Refills: 0 | Status: SHIPPED | OUTPATIENT
Start: 2023-09-22 | End: 2023-11-17

## 2023-11-17 ENCOUNTER — MYC MEDICAL ADVICE (OUTPATIENT)
Dept: FAMILY MEDICINE | Facility: OTHER | Age: 48
End: 2023-11-17

## 2023-11-17 ENCOUNTER — MYC REFILL (OUTPATIENT)
Dept: FAMILY MEDICINE | Facility: OTHER | Age: 48
End: 2023-11-17

## 2023-11-17 DIAGNOSIS — R73.9 BLOOD GLUCOSE ELEVATED: ICD-10-CM

## 2023-11-17 DIAGNOSIS — E03.9 ACQUIRED HYPOTHYROIDISM: ICD-10-CM

## 2023-11-17 DIAGNOSIS — E03.9 ACQUIRED HYPOTHYROIDISM: Primary | ICD-10-CM

## 2023-11-17 NOTE — TELEPHONE ENCOUNTER
Please advise.    Pended TSH with free T4 and T4 free labs.  Please review and sign if appropriate.

## 2023-11-20 RX ORDER — BLOOD-GLUCOSE METER
KIT MISCELLANEOUS
Qty: 100 STRIP | Refills: 4 | Status: SHIPPED | OUTPATIENT
Start: 2023-11-20 | End: 2024-07-18

## 2023-11-20 RX ORDER — LEVOTHYROXINE SODIUM 150 UG/1
150 TABLET ORAL DAILY
Qty: 30 TABLET | Refills: 0 | Status: SHIPPED | OUTPATIENT
Start: 2023-11-20 | End: 2023-12-20

## 2023-11-20 NOTE — TELEPHONE ENCOUNTER
Test strips      Last Written Prescription Date:  4-28-23  Last Fill Quantity: 100 Strips,   # refills: 4    Levothyroxine 150 mcg      Last Written Prescription Date:  9-22-23  Last Fill Quantity: 30,   # refills: 0  Last Office Visit: 4-27-23

## 2023-12-19 DIAGNOSIS — E03.9 ACQUIRED HYPOTHYROIDISM: ICD-10-CM

## 2023-12-19 NOTE — TELEPHONE ENCOUNTER
SYNTHROID      Last Written Prescription Date:  11-20-23  Last Fill Quantity: 30,   # refills: 0  Last Office Visit: 4-27-23  Future Office visit:       Routing refill request to provider for review/approval because:

## 2023-12-20 RX ORDER — LEVOTHYROXINE SODIUM 150 UG/1
150 TABLET ORAL DAILY
Qty: 30 TABLET | Refills: 0 | Status: SHIPPED | OUTPATIENT
Start: 2023-12-20 | End: 2024-01-16

## 2023-12-28 ENCOUNTER — TELEPHONE (OUTPATIENT)
Dept: FAMILY MEDICINE | Facility: OTHER | Age: 48
End: 2023-12-28

## 2023-12-28 NOTE — TELEPHONE ENCOUNTER
Received DENIAL from [facility] for FreeStyle Lite Test strips 12/28/2023.    Letter scanned to [facility].

## 2023-12-28 NOTE — TELEPHONE ENCOUNTER
Received PA request from [employer] for FreeStyle Lite Test strips.  PA Submitted on CMM, waiting for response.

## 2024-01-15 DIAGNOSIS — R73.9 BLOOD GLUCOSE ELEVATED: ICD-10-CM

## 2024-01-16 DIAGNOSIS — E03.9 ACQUIRED HYPOTHYROIDISM: ICD-10-CM

## 2024-01-16 RX ORDER — BLOOD-GLUCOSE METER
KIT MISCELLANEOUS
Qty: 1 KIT | Refills: 0 | Status: SHIPPED | OUTPATIENT
Start: 2024-01-16

## 2024-01-16 RX ORDER — LEVOTHYROXINE SODIUM 150 UG/1
150 TABLET ORAL DAILY
Qty: 30 TABLET | Refills: 0 | Status: SHIPPED | OUTPATIENT
Start: 2024-01-16 | End: 2024-03-06

## 2024-01-16 NOTE — TELEPHONE ENCOUNTER
Synthroid      Last Written Prescription Date:  12/20/23  Last Fill Quantity: 30,   # refills: 0  Last Office Visit: 04/27/23  Future Office visit:

## 2024-01-16 NOTE — TELEPHONE ENCOUNTER
Blood Glucose Monitoring Suppl (FREESTYLE LITE) w/Device KIT   Last Written Prescription Date:  4/27/23  Last Fill Quantity: 1,   # refills: 0  Last Office Visit: 4/27/23  Future Office visit:       Routing refill request to provider for review/approval because:

## 2024-01-16 NOTE — TELEPHONE ENCOUNTER
FREESTYLE LITE BLOOD GLUCOSE SYSTEM       Last Written Prescription Date:  4/27/2023  Last Fill Quantity: 100,   # refills: 4  Last Office Visit: 4/27/2023  Future Office visit:       Routing refill request to provider for review/approval because:    Diabetic Supplies Protocol [id number]/15/2024 05:33 PM   Protocol Details Recent (6 mo) or future (30 days) visit within the authorizing provider's specialty    Medication is active on med list    Patient is 18 years of age or older          Kimberly Boecker, RN

## 2024-01-16 NOTE — TELEPHONE ENCOUNTER
LEVOTHYROXINE 0.150MG (150MCG) TAB       Last Written Prescription Date:  12/20/2023  Last Fill Quantity: 30,   # refills: 0  Last Office Visit: 4/27/2023  Future Office visit:       Routing refill request to provider for review/approval because:    Thyroid Protocol [id number]/16/2024 03:14 AM   Protocol Details Normal TSH on file in past 12 months    Patient is 12 years or older    Recent (12 mo) or future (30 days) visit within the authorizing provider's specialty    Medication is active on med list    No active pregnancy on record    No positive pregnancy test in past 12 months          Kimberly Boecker, RN

## 2024-02-21 ENCOUNTER — MYC MEDICAL ADVICE (OUTPATIENT)
Dept: FAMILY MEDICINE | Facility: OTHER | Age: 49
End: 2024-02-21

## 2024-02-22 ENCOUNTER — MYC MEDICAL ADVICE (OUTPATIENT)
Dept: FAMILY MEDICINE | Facility: OTHER | Age: 49
End: 2024-02-22

## 2024-03-06 DIAGNOSIS — E03.9 ACQUIRED HYPOTHYROIDISM: ICD-10-CM

## 2024-03-06 NOTE — TELEPHONE ENCOUNTER
Levothyroxine       Last Written Prescription Date:  1/16/2024  Last Fill Quantity: 30,   # refills: 0  Last Office Visit: 4/27/2023  Future Office visit:

## 2024-03-07 RX ORDER — LEVOTHYROXINE SODIUM 150 UG/1
150 TABLET ORAL DAILY
Qty: 30 TABLET | Refills: 0 | Status: SHIPPED | OUTPATIENT
Start: 2024-03-07 | End: 2024-04-16

## 2024-04-16 DIAGNOSIS — E03.9 ACQUIRED HYPOTHYROIDISM: ICD-10-CM

## 2024-04-16 NOTE — TELEPHONE ENCOUNTER
Synthroid      Last Written Prescription Date:  03/07/24  Last Fill Quantity: 30,   # refills: 0  Last Office Visit: 04/27/24  Future Office visit:

## 2024-04-16 NOTE — TELEPHONE ENCOUNTER
Thyroid Protocol [id number]/16/2024 10:31 AM   Protocol Details Normal TSH on file in past 12 months         Addended by: Phil Polk on: 3/28/2019 12:39 PM     Modules accepted: Orders

## 2024-04-17 RX ORDER — LEVOTHYROXINE SODIUM 150 UG/1
150 TABLET ORAL DAILY
Qty: 30 TABLET | Refills: 0 | Status: SHIPPED | OUTPATIENT
Start: 2024-04-17 | End: 2024-05-30

## 2024-05-30 DIAGNOSIS — E03.9 ACQUIRED HYPOTHYROIDISM: ICD-10-CM

## 2024-05-30 NOTE — TELEPHONE ENCOUNTER
Synthroid      Last Written Prescription Date:  04/17/24  Last Fill Quantity: 30,   # refills: 0  Last Office Visit: 04/27/23  Future Office visit:

## 2024-05-31 NOTE — TELEPHONE ENCOUNTER
Attempt # 1  Outcome: Left Message   Comment: Lvm for pt to schedule follow up appointment with Monique Lopez

## 2024-05-31 NOTE — TELEPHONE ENCOUNTER
Please see note below regarding Levothyroxine.  She is overdue for follow up and there were concerns with last TSH completed.  GI [facility] referral was placed 5/1/23 at [facility].  Note to Huc to please see if notes as not seeing in chart.  Also ask to schedule patient for visit.  You had pended labs for patient to complete 11/17/23 but yet to be completed by patient.  Cindy Jacobs, RN  Care Coordination

## 2024-06-02 RX ORDER — LEVOTHYROXINE SODIUM 150 UG/1
150 TABLET ORAL DAILY
Qty: 30 TABLET | Refills: 0 | Status: SHIPPED | OUTPATIENT
Start: 2024-06-02 | End: 2024-07-10

## 2024-06-04 NOTE — TELEPHONE ENCOUNTER
Attempt # 2  Outcome: Left Message   Comment: Lvm for pt to [facility] follow up with Monique Lopez

## 2024-07-10 DIAGNOSIS — E03.9 ACQUIRED HYPOTHYROIDISM: ICD-10-CM

## 2024-07-10 RX ORDER — LEVOTHYROXINE SODIUM 150 UG/1
150 TABLET ORAL DAILY
Qty: 30 TABLET | Refills: 0 | Status: SHIPPED | OUTPATIENT
Start: 2024-07-10 | End: 2024-07-18

## 2024-07-10 NOTE — TELEPHONE ENCOUNTER
Levothyroxine  Last signed 6/2 #30, 0 R  Last office visit  Protocol fails for:  Recent (12 mo) or future (30 days) visit within the authorizing provider's specialty    The patient must have completed an in-person or virtual visit within the past 12 months or has a future visit scheduled within the next 90 days with the authorizing provider s specialty.  Urgent care and e-visits do not quality as an office visit for this protocol.    Normal TSH on file in past 12 months        Recent Labs   Lab Test 04/27/23  1234   TSH 5.70*     Patient has upcoming 7/18 visit  Cindy Jacobs, JIMMY  Care Coordination

## 2024-07-18 ENCOUNTER — OFFICE VISIT (OUTPATIENT)
Dept: FAMILY MEDICINE | Facility: OTHER | Age: 49
End: 2024-07-18
Attending: PHYSICIAN ASSISTANT
Payer: COMMERCIAL

## 2024-07-18 VITALS
RESPIRATION RATE: 16 BRPM | TEMPERATURE: 99 F | WEIGHT: 163.9 LBS | SYSTOLIC BLOOD PRESSURE: 108 MMHG | DIASTOLIC BLOOD PRESSURE: 70 MMHG | OXYGEN SATURATION: 98 % | HEART RATE: 88 BPM | BODY MASS INDEX: 29.98 KG/M2

## 2024-07-18 DIAGNOSIS — K04.7 DENTAL INFECTION: Primary | ICD-10-CM

## 2024-07-18 DIAGNOSIS — E03.9 ACQUIRED HYPOTHYROIDISM: ICD-10-CM

## 2024-07-18 DIAGNOSIS — K21.9 GASTROESOPHAGEAL REFLUX DISEASE, UNSPECIFIED WHETHER ESOPHAGITIS PRESENT: ICD-10-CM

## 2024-07-18 DIAGNOSIS — K59.03 DRUG-INDUCED CONSTIPATION: ICD-10-CM

## 2024-07-18 DIAGNOSIS — K92.89 GAS BLOAT SYNDROME: ICD-10-CM

## 2024-07-18 DIAGNOSIS — Z82.0 FAMILY HISTORY OF MIGRAINE HEADACHES: ICD-10-CM

## 2024-07-18 DIAGNOSIS — R73.9 BLOOD GLUCOSE ELEVATED: ICD-10-CM

## 2024-07-18 LAB
ALBUMIN SERPL BCG-MCNC: 4.5 G/DL (ref 3.5–5.2)
ALBUMIN UR-MCNC: 10 MG/DL
ALP SERPL-CCNC: 89 U/L (ref 40–150)
ALT SERPL W P-5'-P-CCNC: 21 U/L (ref 0–50)
ANION GAP SERPL CALCULATED.3IONS-SCNC: 8 MMOL/L (ref 7–15)
APPEARANCE UR: ABNORMAL
AST SERPL W P-5'-P-CCNC: 23 U/L (ref 0–45)
BASOPHILS # BLD AUTO: 0 10E3/UL (ref 0–0.2)
BASOPHILS NFR BLD AUTO: 0 %
BILIRUB SERPL-MCNC: 0.3 MG/DL
BILIRUB UR QL STRIP: NEGATIVE
BUN SERPL-MCNC: 13.1 MG/DL (ref 6–20)
CALCIUM SERPL-MCNC: 9.1 MG/DL (ref 8.8–10.4)
CHLORIDE SERPL-SCNC: 106 MMOL/L (ref 98–107)
COLOR UR AUTO: YELLOW
CREAT SERPL-MCNC: 0.85 MG/DL (ref 0.51–0.95)
EGFRCR SERPLBLD CKD-EPI 2021: 84 ML/MIN/1.73M2
EOSINOPHIL # BLD AUTO: 0.1 10E3/UL (ref 0–0.7)
EOSINOPHIL NFR BLD AUTO: 3 %
ERYTHROCYTE [DISTWIDTH] IN BLOOD BY AUTOMATED COUNT: 13.5 % (ref 10–15)
EST. AVERAGE GLUCOSE BLD GHB EST-MCNC: 114 MG/DL
GLUCOSE SERPL-MCNC: 88 MG/DL (ref 70–99)
GLUCOSE UR STRIP-MCNC: NEGATIVE MG/DL
HBA1C MFR BLD: 5.6 %
HCO3 SERPL-SCNC: 26 MMOL/L (ref 22–29)
HCT VFR BLD AUTO: 38.2 % (ref 35–47)
HGB BLD-MCNC: 12.6 G/DL (ref 11.7–15.7)
HGB UR QL STRIP: NEGATIVE
IMM GRANULOCYTES # BLD: 0 10E3/UL
IMM GRANULOCYTES NFR BLD: 0 %
KETONES UR STRIP-MCNC: NEGATIVE MG/DL
LEUKOCYTE ESTERASE UR QL STRIP: NEGATIVE
LYMPHOCYTES # BLD AUTO: 1.3 10E3/UL (ref 0.8–5.3)
LYMPHOCYTES NFR BLD AUTO: 23 %
MCH RBC QN AUTO: 26.2 PG (ref 26.5–33)
MCHC RBC AUTO-ENTMCNC: 33 G/DL (ref 31.5–36.5)
MCV RBC AUTO: 79 FL (ref 78–100)
MONOCYTES # BLD AUTO: 0.4 10E3/UL (ref 0–1.3)
MONOCYTES NFR BLD AUTO: 7 %
NEUTROPHILS # BLD AUTO: 3.7 10E3/UL (ref 1.6–8.3)
NEUTROPHILS NFR BLD AUTO: 66 %
NITRATE UR QL: NEGATIVE
NRBC # BLD AUTO: 0 10E3/UL
NRBC BLD AUTO-RTO: 0 /100
PH UR STRIP: 7.5 [PH] (ref 4.7–8)
PLATELET # BLD AUTO: 206 10E3/UL (ref 150–450)
POTASSIUM SERPL-SCNC: 4 MMOL/L (ref 3.4–5.3)
PROT SERPL-MCNC: 7.3 G/DL (ref 6.4–8.3)
RBC # BLD AUTO: 4.81 10E6/UL (ref 3.8–5.2)
RBC URINE: 0 /HPF
SODIUM SERPL-SCNC: 140 MMOL/L (ref 135–145)
SP GR UR STRIP: 1.03 (ref 1–1.03)
SQUAMOUS EPITHELIAL: 2 /HPF
T4 FREE SERPL-MCNC: 1.15 NG/DL (ref 0.9–1.7)
TSH SERPL DL<=0.005 MIU/L-ACNC: 4.5 UIU/ML (ref 0.3–4.2)
UROBILINOGEN UR STRIP-MCNC: NORMAL MG/DL
WBC # BLD AUTO: 5.6 10E3/UL (ref 4–11)
WBC URINE: <1 /HPF

## 2024-07-18 PROCEDURE — 80053 COMPREHEN METABOLIC PANEL: CPT | Mod: ZL | Performed by: PHYSICIAN ASSISTANT

## 2024-07-18 PROCEDURE — 83036 HEMOGLOBIN GLYCOSYLATED A1C: CPT | Mod: ZL | Performed by: PHYSICIAN ASSISTANT

## 2024-07-18 PROCEDURE — 81003 URINALYSIS AUTO W/O SCOPE: CPT | Mod: ZL | Performed by: PHYSICIAN ASSISTANT

## 2024-07-18 PROCEDURE — 99214 OFFICE O/P EST MOD 30 MIN: CPT | Performed by: PHYSICIAN ASSISTANT

## 2024-07-18 PROCEDURE — G0463 HOSPITAL OUTPT CLINIC VISIT: HCPCS

## 2024-07-18 PROCEDURE — 36415 COLL VENOUS BLD VENIPUNCTURE: CPT | Mod: ZL | Performed by: PHYSICIAN ASSISTANT

## 2024-07-18 PROCEDURE — 84443 ASSAY THYROID STIM HORMONE: CPT | Mod: ZL | Performed by: PHYSICIAN ASSISTANT

## 2024-07-18 PROCEDURE — 84439 ASSAY OF FREE THYROXINE: CPT | Mod: ZL | Performed by: PHYSICIAN ASSISTANT

## 2024-07-18 PROCEDURE — 85025 COMPLETE CBC W/AUTO DIFF WBC: CPT | Mod: ZL | Performed by: PHYSICIAN ASSISTANT

## 2024-07-18 RX ORDER — PENICILLIN V POTASSIUM 500 MG/1
500 TABLET, FILM COATED ORAL 4 TIMES DAILY
COMMUNITY
Start: 2024-06-11 | End: 2024-07-18

## 2024-07-18 RX ORDER — LACTULOSE 20 G/30ML
20 SOLUTION ORAL 3 TIMES DAILY
Qty: 936 ML | Refills: 3 | Status: SHIPPED | OUTPATIENT
Start: 2024-07-18

## 2024-07-18 RX ORDER — PROMETHAZINE HYDROCHLORIDE 25 MG/1
25 TABLET ORAL EVERY 6 HOURS PRN
Qty: 30 TABLET | Refills: 3 | Status: SHIPPED | OUTPATIENT
Start: 2024-07-18

## 2024-07-18 RX ORDER — MECLIZINE HYDROCHLORIDE 25 MG/1
25 TABLET ORAL
COMMUNITY
Start: 2024-05-06 | End: 2024-07-18

## 2024-07-18 RX ORDER — BLOOD-GLUCOSE METER
KIT MISCELLANEOUS
Qty: 100 STRIP | Refills: 4 | Status: SHIPPED | OUTPATIENT
Start: 2024-07-18

## 2024-07-18 RX ORDER — LANCETS 28 GAUGE
EACH MISCELLANEOUS
Qty: 100 EACH | Refills: 4 | Status: SHIPPED | OUTPATIENT
Start: 2024-07-18

## 2024-07-18 RX ORDER — LEVOTHYROXINE SODIUM 150 UG/1
150 TABLET ORAL DAILY
Qty: 90 TABLET | Refills: 0 | Status: SHIPPED | OUTPATIENT
Start: 2024-07-18 | End: 2024-07-21

## 2024-07-18 RX ORDER — DICYCLOMINE HCL 20 MG
1 TABLET ORAL
COMMUNITY
Start: 2023-12-20 | End: 2024-07-18

## 2024-07-18 RX ORDER — AMOXICILLIN 500 MG/1
500 CAPSULE ORAL 3 TIMES DAILY
Qty: 30 CAPSULE | Refills: 1 | Status: SHIPPED | OUTPATIENT
Start: 2024-07-18

## 2024-07-18 ASSESSMENT — ANXIETY QUESTIONNAIRES
8. IF YOU CHECKED OFF ANY PROBLEMS, HOW DIFFICULT HAVE THESE MADE IT FOR YOU TO DO YOUR WORK, TAKE CARE OF THINGS AT HOME, OR GET ALONG WITH OTHER PEOPLE?: NOT DIFFICULT AT ALL
5. BEING SO RESTLESS THAT IT IS HARD TO SIT STILL: SEVERAL DAYS
IF YOU CHECKED OFF ANY PROBLEMS ON THIS QUESTIONNAIRE, HOW DIFFICULT HAVE THESE PROBLEMS MADE IT FOR YOU TO DO YOUR WORK, TAKE CARE OF THINGS AT HOME, OR GET ALONG WITH OTHER PEOPLE: NOT DIFFICULT AT ALL
GAD7 TOTAL SCORE: 7
2. NOT BEING ABLE TO STOP OR CONTROL WORRYING: SEVERAL DAYS
1. FEELING NERVOUS, ANXIOUS, OR ON EDGE: MORE THAN HALF THE DAYS
7. FEELING AFRAID AS IF SOMETHING AWFUL MIGHT HAPPEN: SEVERAL DAYS
7. FEELING AFRAID AS IF SOMETHING AWFUL MIGHT HAPPEN: SEVERAL DAYS
4. TROUBLE RELAXING: SEVERAL DAYS
6. BECOMING EASILY ANNOYED OR IRRITABLE: NOT AT ALL
GAD7 TOTAL SCORE: 7
3. WORRYING TOO MUCH ABOUT DIFFERENT THINGS: SEVERAL DAYS
GAD7 TOTAL SCORE: 7

## 2024-07-18 ASSESSMENT — PAIN SCALES - GENERAL: PAINLEVEL: NO PAIN (0)

## 2024-07-18 ASSESSMENT — PATIENT HEALTH QUESTIONNAIRE - PHQ9
SUM OF ALL RESPONSES TO PHQ QUESTIONS 1-9: 4
SUM OF ALL RESPONSES TO PHQ QUESTIONS 1-9: 4
10. IF YOU CHECKED OFF ANY PROBLEMS, HOW DIFFICULT HAVE THESE PROBLEMS MADE IT FOR YOU TO DO YOUR WORK, TAKE CARE OF THINGS AT HOME, OR GET ALONG WITH OTHER PEOPLE: NOT DIFFICULT AT ALL

## 2024-07-18 NOTE — PROGRESS NOTES
Assessment & Plan     Dental infection  Has a calcified tooth. Not finding anyone to work on this. Has been 4 years and very painful. Given Amoxil.   - amoxicillin (AMOXIL) 500 MG capsule; Take 1 capsule (500 mg) by mouth 3 times daily    Acquired hypothyroidism  She has very difficult to control thyroid disease.  Her medications are refilled today.  Labs are done.  We will make referral to endocrine for them to manage  - TSH with free T4 reflex; Future  - Hemoglobin A1c; Future  - levothyroxine (SYNTHROID/LEVOTHROID) 150 MCG tablet; Take 1 tablet (150 mcg) by mouth daily  - Adult Endocrinology  Referral; Future    Gastroesophageal reflux disease, unspecified whether esophagitis present  Known esophageal reflux.  Nexium is given.  Discussion on GERD diet.  She does go long times during the day without eating anything.  - esomeprazole (NEXIUM) 20 MG DR capsule; Take 1 capsule (20 mg) by mouth every morning (before breakfast) Take 30-60 minutes before eating.    Family history of migraine headaches  Known migraines.  Phenergan has been able to keep the nausea and vomiting down.  - promethazine (PHENERGAN) 25 MG tablet; Take 1 tablet (25 mg) by mouth every 6 hours as needed for nausea or vomiting    Blood glucose elevated  She has had borderline diabetes.  Prediabetes.  Using her's test strips to watch her sugars a couple of times a day.  Her weight has been very stable.  Gita rarely does come in to see us and I do think it has been about 2 years.  Labs will be drawn today and discussion held  - blood glucose (FREESTYLE LITE) test strip; Use to test blood sugar 2 times daily or as directed.  - blood glucose monitoring (FREESTYLE) lancets; Use to test blood sugar 2 times daily or as directed.    Drug-induced constipation  We will try lactulose for her constipation.  Dietary changes have never been beneficial for this.  She will let me know via MyChart  - lactulose 20 GM/30ML solution; Take 30 mLs (20 g) by  mouth 3 times daily  - CBC with platelets and differential; Future  - Comprehensive metabolic panel (BMP + Alb, Alk Phos, ALT, AST, Total. Bili, TP); Future  - UA Macroscopic with reflex to Microscopic and Culture; Future            There are no Patient Instructions on file for this visit.    No follow-ups on file.    Michaela Pelayo is a 48 year old, presenting for the following health issues:  Recheck Medication      7/18/2024     3:20 PM   Additional Questions   Roomed by Sebastian Selby lpn   Accompanied by daughter     History of Present Illness       Reason for visit:  Folloe up    She eats 2-3 servings of fruits and vegetables daily.She consumes 0 sweetened beverage(s) daily.She exercises with enough effort to increase her heart rate 30 to 60 minutes per day.  She exercises with enough effort to increase her heart rate 4 days per week.   She is taking medications regularly.       Hypothyroidism Follow-up    Since last visit, patient describes the following symptoms: weight gain of 6 lbs, dry skin, constipation, fatigue, and hair loss      Constipation  Onset/Duration: last couple months  Description:  Frequency of bowel movements: small once every two weeks  Consistency of stool: pretty sofft  Progression of Symptoms: same  Accompanying signs and symptoms:    Abdominal pain: YES   Rectal pain: No   Blood in stool: No   Nausea/Vomiting: YES- nausea   Weight loss or gain: YES- gain  History:   Similar problems in past: YES  History of abdominal surgery: No  Chronic laxative use: No  New medications: No  Precipitating or alleviating factors: intolerance to dairy makes them less constipated and will get cramping and have a bowel movement  Therapies tried and outcome: miralax it helped to have a movement but otherwise did not get rid of symptoms        Review of Systems  Constitutional, neuro, ENT, endocrine, pulmonary, cardiac, gastrointestinal, genitourinary, musculoskeletal, integument and psychiatric systems  are negative, except as otherwise noted.      Objective    /70 (BP Location: Left arm, Patient Position: Sitting, Cuff Size: Adult Regular)   Pulse 88   Temp 99  F (37.2  C) (Tympanic)   Resp 16   Wt 74.3 kg (163 lb 14.4 oz)   LMP 07/04/2024 (Approximate)   SpO2 98%   BMI 29.98 kg/m    Body mass index is 29.98 kg/m .  Physical Exam   GENERAL: alert and no distress  EYES: Eyes grossly normal to inspection, PERRL and conjunctivae and sclerae normal  HENT: ear canals and TM's normal, nose and mouth without ulcers or lesions  NECK: no adenopathy, no asymmetry, masses, or scars  RESP: lungs clear to auscultation - no rales, rhonchi or wheezes  CV: regular rate and rhythm, normal S1 S2, no S3 or S4, no murmur, click or rub, no peripheral edema  ABDOMEN: soft, nontender, no hepatosplenomegaly, no masses and bowel sounds normal  MS: no gross musculoskeletal defects noted, no edema  SKIN: no suspicious lesions or rashes  NEURO: Normal strength and tone, mentation intact and speech normal  PSYCH: mentation appears normal, affect normal/bright    Results for orders placed or performed in visit on 07/18/24   TSH with free T4 reflex     Status: Abnormal   Result Value Ref Range    TSH 4.50 (H) 0.30 - 4.20 uIU/mL   Hemoglobin A1c     Status: None   Result Value Ref Range    Estimated Average Glucose 114 mg/dL    Hemoglobin A1C 5.6 <5.7 %   Comprehensive metabolic panel (BMP + Alb, Alk Phos, ALT, AST, Total. Bili, TP)     Status: Normal   Result Value Ref Range    Sodium 140 135 - 145 mmol/L    Potassium 4.0 3.4 - 5.3 mmol/L    Carbon Dioxide (CO2) 26 22 - 29 mmol/L    Anion Gap 8 7 - 15 mmol/L    Urea Nitrogen 13.1 6.0 - 20.0 mg/dL    Creatinine 0.85 0.51 - 0.95 mg/dL    GFR Estimate 84 >60 mL/min/1.73m2    Calcium 9.1 8.8 - 10.4 mg/dL    Chloride 106 98 - 107 mmol/L    Glucose 88 70 - 99 mg/dL    Alkaline Phosphatase 89 40 - 150 U/L    AST 23 0 - 45 U/L    ALT 21 0 - 50 U/L    Protein Total 7.3 6.4 - 8.3 g/dL     Albumin 4.5 3.5 - 5.2 g/dL    Bilirubin Total 0.3 <=1.2 mg/dL   UA Macroscopic with reflex to Microscopic and Culture     Status: Abnormal    Specimen: Urine, Clean Catch   Result Value Ref Range    Color Urine Yellow Colorless, Straw, Light Yellow, Yellow    Appearance Urine Slightly Cloudy (A) Clear    Glucose Urine Negative Negative mg/dL    Bilirubin Urine Negative Negative    Ketones Urine Negative Negative mg/dL    Specific Gravity Urine 1.026 1.003 - 1.035    Blood Urine Negative Negative    pH Urine 7.5 4.7 - 8.0    Protein Albumin Urine 10 (A) Negative mg/dL    Urobilinogen Urine Normal Normal, 2.0 mg/dL    Nitrite Urine Negative Negative    Leukocyte Esterase Urine Negative Negative    RBC Urine 0 <=2 /HPF    WBC Urine <1 <=5 /HPF    Squamous Epithelials Urine 2 (H) <=1 /HPF    Narrative    Urine Culture not indicated   CBC with platelets and differential     Status: Abnormal   Result Value Ref Range    WBC Count 5.6 4.0 - 11.0 10e3/uL    RBC Count 4.81 3.80 - 5.20 10e6/uL    Hemoglobin 12.6 11.7 - 15.7 g/dL    Hematocrit 38.2 35.0 - 47.0 %    MCV 79 78 - 100 fL    MCH 26.2 (L) 26.5 - 33.0 pg    MCHC 33.0 31.5 - 36.5 g/dL    RDW 13.5 10.0 - 15.0 %    Platelet Count 206 150 - 450 10e3/uL    % Neutrophils 66 %    % Lymphocytes 23 %    % Monocytes 7 %    % Eosinophils 3 %    % Basophils 0 %    % Immature Granulocytes 0 %    NRBCs per 100 WBC 0 <1 /100    Absolute Neutrophils 3.7 1.6 - 8.3 10e3/uL    Absolute Lymphocytes 1.3 0.8 - 5.3 10e3/uL    Absolute Monocytes 0.4 0.0 - 1.3 10e3/uL    Absolute Eosinophils 0.1 0.0 - 0.7 10e3/uL    Absolute Basophils 0.0 0.0 - 0.2 10e3/uL    Absolute Immature Granulocytes 0.0 <=0.4 10e3/uL    Absolute NRBCs 0.0 10e3/uL   T4 free     Status: Normal   Result Value Ref Range    Free T4 1.15 0.90 - 1.70 ng/dL   CBC with platelets and differential     Status: Abnormal    Narrative    The following orders were created for panel order CBC with platelets and differential.  Procedure                                Abnormality         Status                     ---------                               -----------         ------                     CBC with platelets and d...[[id number]]  Abnormal            Final result                 Please view results for these tests on the individual orders.           Signed Electronically by: MYLES Tabor

## 2024-07-21 DIAGNOSIS — E03.9 ACQUIRED HYPOTHYROIDISM: ICD-10-CM

## 2024-07-21 RX ORDER — LEVOTHYROXINE SODIUM 150 UG/1
150 TABLET ORAL DAILY
Qty: 90 TABLET | Refills: 0 | Status: SHIPPED | OUTPATIENT
Start: 2024-07-21 | End: 2024-08-15

## 2024-07-25 ENCOUNTER — MYC MEDICAL ADVICE (OUTPATIENT)
Dept: FAMILY MEDICINE | Facility: OTHER | Age: 49
End: 2024-07-25

## 2024-08-15 DIAGNOSIS — E03.9 ACQUIRED HYPOTHYROIDISM: ICD-10-CM

## 2024-08-15 RX ORDER — LEVOTHYROXINE SODIUM 150 UG/1
150 TABLET ORAL DAILY
Qty: 90 TABLET | Refills: 0 | Status: SHIPPED | OUTPATIENT
Start: 2024-08-15

## 2024-08-15 NOTE — TELEPHONE ENCOUNTER
Thyroid Protocol [id number]/15/2024 03:17 AM   Protocol Details Normal TSH on file in past 12 months

## 2024-08-15 NOTE — TELEPHONE ENCOUNTER
Synthroid      Last Written Prescription Date:  07/21/24  Last Fill Quantity: 90,   # refills: 0  Last Office Visit: 07/18/24  Future Office visit:

## 2024-08-22 ENCOUNTER — MYC MEDICAL ADVICE (OUTPATIENT)
Dept: FAMILY MEDICINE | Facility: OTHER | Age: 49
End: 2024-08-22

## 2024-08-23 NOTE — TELEPHONE ENCOUNTER
8/23/2024 2:58 PM  Writer called  Referral line to inquire on status. Per  referral line referral was cancelled out due to max attempts to reach pt were completed referral taken out of thei work queue. Per  staff, pt will need to call the referral line to schedule their appointment.  reports nothing is needed from writer at this time, they still have the active referral to use. Per  Staff, first Endocrinology outreach to pt on 7/31/24, they sent my chart messages, multiple calls made, and finally a letter sent to pt. Telephone: [phone]   Cristina Ordaz, JIMMY

## 2024-10-04 ENCOUNTER — MYC REFILL (OUTPATIENT)
Dept: FAMILY MEDICINE | Facility: OTHER | Age: 49
End: 2024-10-04

## 2024-10-04 DIAGNOSIS — K04.7 DENTAL INFECTION: ICD-10-CM

## 2024-10-07 DIAGNOSIS — K04.7 DENTAL INFECTION: ICD-10-CM

## 2024-10-08 NOTE — TELEPHONE ENCOUNTER
amoxicillin (AMOXIL) 500 MG capsule       Last Written Prescription Date:  07/18/2024  Last Fill Quantity: 30,   # refills: 1  Last Office Visit: 07/18/2024  Future Office visit:       Routing refill request to provider for review/approval because:    Acne/Rosacea Medications (Oral) Protocol Kwoawo84/04/2024 04:07 PM   Protocol Details Medication indicated for associated diagnosis        Kimberly Boecker, RN

## 2024-10-09 RX ORDER — AMOXICILLIN 500 MG/1
CAPSULE ORAL
Qty: 30 CAPSULE | Refills: 1 | Status: SHIPPED | OUTPATIENT
Start: 2024-10-09

## 2024-10-09 NOTE — TELEPHONE ENCOUNTER
AMOXICILLIN 500MG CAPSULES       Last Written Prescription Date:  07/18/2024  Last Fill Quantity: 30,   # refills: 1  Last Office Visit: 07/18/2024  Future Office visit:       Routing refill request to provider for review/approval because:  Acne/Rosacea Medications (Oral) Protocol [id number]/07/2024 03:08 PM   Protocol Details Medication indicated for associated diagnosis       Kimberly Boecker, RN

## 2024-10-10 RX ORDER — AMOXICILLIN 500 MG/1
500 CAPSULE ORAL 3 TIMES DAILY
Qty: 30 CAPSULE | Refills: 1 | OUTPATIENT
Start: 2024-10-10

## 2024-11-18 DIAGNOSIS — E03.9 ACQUIRED HYPOTHYROIDISM: ICD-10-CM

## 2024-11-18 RX ORDER — LEVOTHYROXINE SODIUM 150 UG/1
150 TABLET ORAL DAILY
Qty: 90 TABLET | Refills: 3 | Status: SHIPPED | OUTPATIENT
Start: 2024-11-18

## 2024-11-18 NOTE — TELEPHONE ENCOUNTER
levothyroxine (SYNTHROID/LEVOTHROID) 150 MCG tablet       Last Written Prescription Date:  08/15/2024  Last Fill Quantity: 90,   # refills: 0  Last Office Visit: 07/18/2024  Future Office visit:       Routing refill request to provider for review/approval because:    Thyroid Protocol [id number]/18/2024 09:36 AM   Protocol Details Normal TSH on file in past 12 months        Kimberly Boecker, RN